# Patient Record
Sex: MALE | Race: WHITE | NOT HISPANIC OR LATINO | Employment: FULL TIME | URBAN - METROPOLITAN AREA
[De-identification: names, ages, dates, MRNs, and addresses within clinical notes are randomized per-mention and may not be internally consistent; named-entity substitution may affect disease eponyms.]

---

## 2017-03-22 ENCOUNTER — OFFICE VISIT (OUTPATIENT)
Dept: MEDICAL GROUP | Facility: PHYSICIAN GROUP | Age: 42
End: 2017-03-22
Payer: COMMERCIAL

## 2017-03-22 VITALS
TEMPERATURE: 97.5 F | HEART RATE: 108 BPM | RESPIRATION RATE: 14 BRPM | DIASTOLIC BLOOD PRESSURE: 104 MMHG | OXYGEN SATURATION: 97 % | SYSTOLIC BLOOD PRESSURE: 136 MMHG | WEIGHT: 189 LBS | HEIGHT: 73 IN | BODY MASS INDEX: 25.05 KG/M2

## 2017-03-22 DIAGNOSIS — L98.9 CHEST SKIN LESION: ICD-10-CM

## 2017-03-22 DIAGNOSIS — N52.9 ERECTILE DYSFUNCTION, UNSPECIFIED ERECTILE DYSFUNCTION TYPE: ICD-10-CM

## 2017-03-22 DIAGNOSIS — M79.671 PAIN IN BOTH FEET: ICD-10-CM

## 2017-03-22 DIAGNOSIS — M79.672 PAIN IN BOTH FEET: ICD-10-CM

## 2017-03-22 DIAGNOSIS — Z86.69 HX OF MIGRAINES: ICD-10-CM

## 2017-03-22 PROCEDURE — 99204 OFFICE O/P NEW MOD 45 MIN: CPT | Performed by: NURSE PRACTITIONER

## 2017-03-22 RX ORDER — IBUPROFEN 200 MG
200 TABLET ORAL EVERY 6 HOURS PRN
COMMUNITY
End: 2018-01-18

## 2017-03-22 RX ORDER — GABAPENTIN 300 MG/1
300 CAPSULE ORAL
Qty: 30 CAP | Refills: 1 | Status: SHIPPED | OUTPATIENT
Start: 2017-03-22 | End: 2017-04-19

## 2017-03-22 ASSESSMENT — PATIENT HEALTH QUESTIONNAIRE - PHQ9: CLINICAL INTERPRETATION OF PHQ2 SCORE: 0

## 2017-03-22 NOTE — ASSESSMENT & PLAN NOTE
Patient states he has had elevated blood pressure is entire life that he is aware of. He has not seen him for [place] for 20+ years. He plays basketball 2-3 times a week including gym time. His weight 3 years ago was 320 pounds. Today he is at 189. He watches what he eats and increased exercise and did the weight reduction on his own. Blood pressure today at clinic 136/104 pulse rate 108, oxygen saturation 97%. Denies headache chest pain palpitations

## 2017-03-22 NOTE — MR AVS SNAPSHOT
"Ivan Prescott   3/22/2017 9:00 AM   Office Visit   MRN: [id number]    Department:  [facility]   Dept Phone:  [phone]    Description:  Male : 1975   Provider:  NHAN Oshea           Reason for Visit     Establish Care     Numbness both feet     Leg Pain burning     Sexual Problem     Polyuria           Allergies as of 3/22/2017     No Known Allergies      You were diagnosed with     Pain in both feet   [778452]       Elevated blood pressure   [362758]       Erectile dysfunction, unspecified erectile dysfunction type   [1618963]       Chest skin lesion   [702071]       Hx of migraines   [737839]         Vital Signs     Blood Pressure Pulse Temperature Respirations Height Weight    136/104 mmHg 108 36.4 °C (97.5 °F) 14 1.854 m (6' 1\") 85.73 kg (189 lb)    Body Mass Index Oxygen Saturation Smoking Status             24.94 kg/m2 97% Never Smoker          Basic Information     Date Of Birth Sex Race Ethnicity Preferred Language    1975 Male White Non- English      Your appointments     Mar 25, 2017  8:15 AM   Adult Draw/Collection with [facility]   [facility] (--)    [address] [place] [address]   [phone]            2017  3:40 PM   Established Patient with NHAN Oshea   [facility] ([facility]    [address] [place] [address] [phone]           You will be receiving a confirmation call a few days before your appointment from our automated call confirmation system.              Problem List              ICD-10-CM Priority Class Noted - Resolved    Pain in both feet M79.671, M79.672   3/22/2017 - Present    Erectile dysfunction N52.9   3/22/2017 - Present    Elevated blood pressure I10   3/22/2017 - Present    Chest skin lesion L98.9   3/22/2017 - Present    Hx of migraines Z86.69   3/22/2017 - Present      Health Maintenance     Patient has no pending health maintenance at this time      Current Immunizations     No " immunizations on file.      Below and/or attached are the medications your provider expects you to take. Review all of your home medications and newly ordered medications with your provider and/or pharmacist. Follow medication instructions as directed by your provider and/or pharmacist. Please keep your medication list with you and share with your provider. Update the information when medications are discontinued, doses are changed, or new medications (including over-the-counter products) are added; and carry medication information at all times in the event of emergency situations     Allergies:  No Known Allergies          Medications  Valid as of: March 22, 2017 -  9:36 AM    Generic Name Brand Name Tablet Size Instructions for use    Gabapentin (Cap) NEURONTIN 300 MG Take 1 Cap by mouth at bedtime as needed.  May repeat 1 time..        Ibuprofen (Tab) MOTRIN 200 MG Take 200 mg by mouth every 6 hours as needed.        .                 Medicines prescribed today were sent to:     [facility] [address] [place], [place] [address] [address] [address]    Phone: [phone] Fax: [phone]    Open 24 Hours?: No      Medication refill instructions:       If your prescription bottle indicates you have medication refills left, it is not necessary to call your provider’s office. Please contact your pharmacy and they will refill your medication.    If your prescription bottle indicates you do not have any refills left, you may request refills at any time through one of the following ways: The online [employer] system (except Urgent Care), by calling your provider’s office, or by asking your pharmacy to contact your provider’s office with a refill request. Medication refills are processed only during regular business hours and may not be available until the next business day. Your provider may request additional information or to have a follow-up visit with you prior to refilling your  medication.   *Please Note: Medication refills are assigned a new Rx number when refilled electronically. Your pharmacy may indicate that no refills were authorized even though a new prescription for the same medication is available at the pharmacy. Please request the medicine by name with the pharmacy before contacting your provider for a refill.        Your To Do List     Future Labs/Procedures Complete By Expires    COMP METABOLIC PANEL  As directed 3/23/2018    HEMOGLOBIN A1C  As directed 3/23/2018    LIPID PROFILE  As directed 3/23/2018         Classting Access Code: [email or web address]  Expires: 4/21/2017  9:31 AM    Classting  A secure, online tool to manage your health information     [employer]’s Classting® is a secure, online tool that connects you to your personalized health information from the privacy of your home -- day or night - making it very easy for you to manage your healthcare. Once the activation process is completed, you can even access your medical information using the Classting cherelle, which is available for free in the [employer] C[employer] store or Google Play store.     Classting provides the following levels of access (as shown below):   My Chart Features   [facility] Doctor [facility]  Specialists [facility]  Urgent  Care Non-[facility]  Primary Care  Doctor   Email your healthcare team securely and privately 24/7 X X X    Manage appointments: schedule your next appointment; view details of past/upcoming appointments X      Request prescription refills. X      View recent personal medical records, including lab and immunizations X X X X   View health record, including health history, allergies, medications X X X X   Read reports about your outpatient visits, procedures, consult and ER notes X X X X   See your discharge summary, which is a recap of your hospital and/or ER visit that includes your diagnosis, lab results, and care plan. X X       How to register for Classting:  1. Go to   [email or web address].  2. Click on the Sign Up Now box, which takes you to the New Member Sign Up page. You will need to provide the following information:  a. Enter your [employer] Access Code exactly as it appears at the top of this page. (You will not need to use this code after you’ve completed the sign-up process. If you do not sign up before the expiration date, you must request a new code.)   b. Enter your date of birth.   c. Enter your home email address.   d. Click Submit, and follow the next screen’s instructions.  3. Create a [employer] [facility] ID. This will be your [employer] [facility] login ID and cannot be changed, so think of one that is secure and easy to remember.  4. Create a [employer] password. You can change your password at any time.  5. Enter your Password Reset Question and Answer. This can be used at a later time if you forget your password.   6. Enter your e-mail address. This allows you to receive e-mail notifications when new information is available in [employer] [facility].  7. Click Sign Up. You can now view your health information.    For assistance activating your [employer] account, call [phone]        Quit Tobacco Information     Do you want to quit using tobacco?    Quitting tobacco decreases risks of cancer, heart and lung disease, increases life expectancy, improves sense of taste and smell, and increases spending money, among other benefits.    If you are thinking about quitting, we can help.  • [facility] Quit Tobacco Program: 720.563.8471  o Program occurs weekly for four weeks and includes pharmacist consultation on products to support quitting smoking or chewing tobacco. A provider referral is needed for pharmacist consultation.  • Tobacco Users Help Hotline: 7-800-QUIT-NOW (410-3291) or https://[place].quitlogix.org/  o Free, confidential telephone and online coaching for [place] residents. Sessions are designed on a schedule that is convenient for you. Eligible clients receive free nicotine replacement  therapy.  • Nationally: www.smokefree.gov  o Information and professional assistance to support both immediate and long-term needs as you become, and remain, a non-smoker. Smokefree.gov allows you to choose the help that best fits your needs.

## 2017-03-22 NOTE — ASSESSMENT & PLAN NOTE
Patient reports that he has noticed a small lesion on his right chest just under his nipple area. No pain tenderness noted.

## 2017-03-22 NOTE — ASSESSMENT & PLAN NOTE
Patient states that he does have a history of migraine headaches. He states that when he gets these he takes Advil and that since up in a dark room and sleeps. He denies aura dizziness numbness or tingling in face or hands or abdominal pain.

## 2017-03-22 NOTE — PROGRESS NOTES
Ivan Prescott is a 41 y.o white. male here today to establish care and for evaluation and management of:    HPI:  Ivan works as a  for the [employer]. He has 2 children ages 3 and 5.  Chest skin lesion  Patient reports that he has noticed a small lesion on his right chest just under his nipple area. No pain tenderness noted.    Elevated blood pressure  Patient states he has had elevated blood pressure is entire life that he is aware of. He has not seen him for [place] for 20+ years. He plays basketball 2-3 times a week including gym time. His weight 3 years ago was 320 pounds. Today he is at 189. He watches what he eats and increased exercise and did the weight reduction on his own. Blood pressure today at clinic 136/104 pulse rate 108, oxygen saturation 97%. Denies headache chest pain palpitations    Erectile dysfunction  Patient states that he is very concerned that he is not able to maintain an erection. States that he does get aroused and has an erection but unable to maintain the erection. He denies any lesions swelling pain in penis or scrotum or testicles.    Pain in both feet  Patient states that when he gets home from work at night he has numbness in both feet and feels a fire-like pain at night when he is laying down. Denies ankle swelling, trauma, a change in shoes. Patient states that he occasionally takes ibuprofen but this does not completely help with this issue.    Hx of migraines  Patient states that he does have a history of migraine headaches. He states that when he gets these he takes Advil and that since up in a dark room and sleeps. He denies aura dizziness numbness or tingling in face or hands or abdominal pain.      Current medicines (including changes today)  Current Outpatient Prescriptions   Medication Sig Dispense Refill   • ibuprofen (MOTRIN) 200 MG Tab Take 200 mg by mouth every 6 hours as needed.     • gabapentin (NEURONTIN) 300 MG Cap Take 1 Cap by mouth at  "bedtime as needed.  May repeat 1 time.. 30 Cap 1     No current facility-administered medications for this visit.       He  has a past medical history of Migraines; Hypertension; Anxiety; and Allergy.    He  has past surgical history that includes knee arthrotomy and eye surgery.    Social History   Substance Use Topics   • Smoking status: Never Smoker    • Smokeless tobacco: Current User     Types: Chew   • Alcohol Use: No       Social History     Social History Narrative       Family History   Problem Relation Age of Onset   • No Known Problems Mother    • No Known Problems Father    • No Known Problems Sister    • Diabetes Paternal Grandmother        Family Status   Relation Status Death Age   • Mother Alive    • Father Alive    • Sister Alive          ROS  As stated in history of present illness.  All other systems reviewed and are negative     Objective:     Blood pressure 136/104, pulse 108, temperature 36.4 °C (97.5 °F), resp. rate 14, height 1.854 m (6' 1\"), weight 85.73 kg (189 lb), SpO2 97 %. Body mass index is 24.94 kg/(m^2).  Physical Exam:    Constitutional: Alert, no distress.  Skin: Warm, dry, good turgor, no rashes in visible areas. Small being sized lesion noted on right lower chest midline to the nipple.   Eye: Equal, round and reactive, conjunctiva clear, lids normal.  ENMT: Lips without lesions, good dentition, oropharynx clear. Patient sees dentist every 6 months. He is a every day tobacco chewer.   Neck: Trachea midline, no masses, no thyromegaly. No cervical or supraclavicular lymphadenopathy.  Respiratory: Unlabored respiratory effort, lungs clear to auscultation, no wheezes, no ronchi.  Cardiovascular: Normal S1, S2, no murmur, no edema.  Abdomen: Soft, non-tender, no masses, no hepatosplenomegaly.  Msk:Monofilament testing with a 10 gram force: sensation intact: intact bilaterally  Visual Inspection: Feet without maceration, ulcers, fissures.  Pedal pulses: intact bilaterally.    Psych: " Alert and oriented x3, normal affect and mood.        Assessment and Plan:   The following treatment plan was discussed    1. Pain in both feet  This is a new problem to me. Chronic. Ongoing. Unstable. Will draw labs to rule out diabetes as this could be contributing to his symptoms. Gabapentin 300 mg daily at bedtime as a trial to see if this will improve his symptoms at night and improve his sleep. Patient will follow up in 4 weeks.  - HEMOGLOBIN A1C; Future    2. Elevated blood pressure  This is a new problem to me. Chronic. Ongoing. Unstable. Discussed salt and diet and its effect on blood pressure. I do believe that since the patient has not seen a provider in 20 years she was extremely nervous to come in today this may have contributed to his reading not being at goal. We'll follow up in 4 weeks for recheck on blood pressure. Will draw labs to check renal liver and lipid profile. Monitor results.  - COMP METABOLIC PANEL; Future  - LIPID PROFILE; Future    3. Erectile dysfunction, unspecified erectile dysfunction type  This is a new problem to me. Chronic. Unstable. Will draw labs to rule out metabolic issues we'll monitor and follow    4. Chest skin lesion  This is a new problem to me. Chronic. Stable. This lesion feels more like a cyst. We will monitor this in 6 months to see if it's changed at all    5. Hx of migraines  This is a new problem to me. Chronic. Stable. Patient to continue to take Advil as needed for migraine pain. We'll monitor.      Records requested.  Followup: Return in about 4 weeks (around 4/19/2017) for HTN.

## 2017-03-22 NOTE — ASSESSMENT & PLAN NOTE
Patient states that he is very concerned that he is not able to maintain an erection. States that he does get aroused and has an erection but unable to maintain the erection. He denies any lesions swelling pain in penis or scrotum or testicles.

## 2017-03-22 NOTE — ASSESSMENT & PLAN NOTE
Patient states that when he gets home from work at night he has numbness in both feet and feels a fire-like pain at night when he is laying down. Denies ankle swelling, trauma, a change in shoes. Patient states that he occasionally takes ibuprofen but this does not completely help with this issue.

## 2017-03-25 ENCOUNTER — HOSPITAL ENCOUNTER (OUTPATIENT)
Dept: LAB | Facility: MEDICAL CENTER | Age: 42
End: 2017-03-25
Attending: NURSE PRACTITIONER
Payer: COMMERCIAL

## 2017-03-25 DIAGNOSIS — M79.672 PAIN IN BOTH FEET: ICD-10-CM

## 2017-03-25 DIAGNOSIS — N52.9 ERECTILE DYSFUNCTION, UNSPECIFIED ERECTILE DYSFUNCTION TYPE: ICD-10-CM

## 2017-03-25 DIAGNOSIS — M79.671 PAIN IN BOTH FEET: ICD-10-CM

## 2017-03-25 LAB
ALBUMIN SERPL BCP-MCNC: 4.4 G/DL (ref 3.2–4.9)
ALBUMIN/GLOB SERPL: 1.3 G/DL
ALP SERPL-CCNC: 145 U/L (ref 30–99)
ALT SERPL-CCNC: 57 U/L (ref 2–50)
ANION GAP SERPL CALC-SCNC: 8 MMOL/L (ref 0–11.9)
AST SERPL-CCNC: 31 U/L (ref 12–45)
BILIRUB SERPL-MCNC: 0.9 MG/DL (ref 0.1–1.5)
BUN SERPL-MCNC: 13 MG/DL (ref 8–22)
CALCIUM SERPL-MCNC: 9.8 MG/DL (ref 8.5–10.5)
CHLORIDE SERPL-SCNC: 103 MMOL/L (ref 96–112)
CHOLEST SERPL-MCNC: 230 MG/DL (ref 100–199)
CO2 SERPL-SCNC: 26 MMOL/L (ref 20–33)
CREAT SERPL-MCNC: 1.07 MG/DL (ref 0.5–1.4)
EST. AVERAGE GLUCOSE BLD GHB EST-MCNC: 324 MG/DL
GLOBULIN SER CALC-MCNC: 3.3 G/DL (ref 1.9–3.5)
GLUCOSE SERPL-MCNC: 365 MG/DL (ref 65–99)
HBA1C MFR BLD: 12.9 % (ref 0–5.6)
HDLC SERPL-MCNC: 39 MG/DL
LDLC SERPL CALC-MCNC: 168 MG/DL
POTASSIUM SERPL-SCNC: 4.9 MMOL/L (ref 3.6–5.5)
PROT SERPL-MCNC: 7.7 G/DL (ref 6–8.2)
PSA SERPL DL<=0.01 NG/ML-MCNC: 0.78 NG/ML (ref 0–4)
SODIUM SERPL-SCNC: 137 MMOL/L (ref 135–145)
TESTOST SERPL-MCNC: 260 NG/DL (ref 175–781)
TRIGL SERPL-MCNC: 114 MG/DL (ref 0–149)
TSH SERPL DL<=0.005 MIU/L-ACNC: 1.92 UIU/ML (ref 0.3–3.7)

## 2017-03-25 PROCEDURE — 84443 ASSAY THYROID STIM HORMONE: CPT

## 2017-03-25 PROCEDURE — 84153 ASSAY OF PSA TOTAL: CPT

## 2017-03-25 PROCEDURE — 80061 LIPID PANEL: CPT

## 2017-03-25 PROCEDURE — 80053 COMPREHEN METABOLIC PANEL: CPT

## 2017-03-25 PROCEDURE — 84403 ASSAY OF TOTAL TESTOSTERONE: CPT

## 2017-03-25 PROCEDURE — 83036 HEMOGLOBIN GLYCOSYLATED A1C: CPT

## 2017-03-25 PROCEDURE — 36415 COLL VENOUS BLD VENIPUNCTURE: CPT

## 2017-03-27 ENCOUNTER — TELEPHONE (OUTPATIENT)
Dept: MEDICAL GROUP | Facility: PHYSICIAN GROUP | Age: 42
End: 2017-03-27

## 2017-03-27 NOTE — TELEPHONE ENCOUNTER
----- Message from NHAN Oshea sent at 3/27/2017  7:20 AM PDT -----  Ivan  Your lab results are back.  I would like to go over these at your appointment in April.  The glucose and A1c levels indicate that you are diabetic.  A1c at 12.9 and glucose at 365.  This can certainly account for your feet pain and your ED.    Testosterone and PSA levels were normal.  Cholesterol came in high at 230.    Since we have a few weeks before your appointment, I would like you to start a medication, Metformin, to help get your blood sugar down.  You take the medication morning and night with meals.  It is important that you do some reading about diabetes and diet.  It all comes down to sugar and carbs(which turn into sugar).  Daily exercise is another important part of treating diabetes.    Let's start the metformin and then we will make a plan on April 19th when we see each other. Please let me know what questions you have.   NHAN Oshea

## 2017-03-27 NOTE — Clinical Note
March 27, 2017         Ivan Prescott  [address]  [facility] [address]        Ivan ,    Your lab results are back.  I would like to go over these at your appointment in April.  The glucose and A1c levels indicate that you are diabetic.  A1c at 12.9 and glucose at 365.  This can certainly account for your feet pain and your ED.     Testosterone and PSA levels were normal.  Cholesterol came in high at 230.     Since we have a few weeks before your appointment, I would like you to start a medication, Metformin, to help get your blood sugar down.  You take the medication morning and night with meals.  It is important that you do some reading about diabetes and diet.  It all comes down to sugar and carbs(which turn into sugar).  Daily exercise is another important part of treating diabetes.     Let's start the metformin and then we will make a plan on April 19th when we see each other. Please let me know what questions you have.           Resulted Orders   COMP METABOLIC PANEL   Result Value Ref Range    Sodium 137 135 - 145 mmol/L    Potassium 4.9 3.6 - 5.5 mmol/L    Chloride 103 96 - 112 mmol/L    Co2 26 20 - 33 mmol/L    Anion Gap 8.0 0.0 - 11.9    Glucose 365 (H) 65 - 99 mg/dL    Bun 13 8 - 22 mg/dL    Creatinine 1.07 0.50 - 1.40 mg/dL    Calcium 9.8 8.5 - 10.5 mg/dL    AST(SGOT) 31 12 - 45 U/L    ALT(SGPT) 57 (H) 2 - 50 U/L    Alkaline Phosphatase 145 (H) 30 - 99 U/L    Total Bilirubin 0.9 0.1 - 1.5 mg/dL    Albumin 4.4 3.2 - 4.9 g/dL    Total Protein 7.7 6.0 - 8.2 g/dL    Globulin 3.3 1.9 - 3.5 g/dL    A-G Ratio 1.3 g/dL    Narrative    Request patient fasting?->Yes   HEMOGLOBIN A1C   Result Value Ref Range    Glycohemoglobin 12.9 (H) 0.0 - 5.6 %      Comment:      Increased risk for diabetes:  5.7 -6.4%  Diabetes:  >6.4%  Glycemic control for adults with diabetes:  <7.0%  The above interpretations are per ADA guidelines.  Diagnosis  of diabetes mellitus on the basis of elevated Hemoglobin  A1c  should be confirmed by repeating the Hb A1c test.      Est Avg Glucose 324 mg/dL      Comment:      The eAG calculation is based on the A1c-Derived Daily Glucose  (ADAG) study.  See the ADA's website for additional information.      Narrative    Request patient fasting?->Yes   LIPID PROFILE   Result Value Ref Range    Cholesterol,Tot 230 (H) 100 - 199 mg/dL    Triglycerides 114 0 - 149 mg/dL    HDL 39 (A) >=40 mg/dL     (H) <100 mg/dL    Narrative    Request patient fasting?->Yes   TSH   Result Value Ref Range    TSH 1.920 0.300 - 3.700 uIU/mL    Narrative    Request patient fasting?->Yes   TESTOSTERONE SERUM   Result Value Ref Range    Testosterone,Total 260 175 - 781 ng/dL    Narrative    Request patient fasting?->Yes   PROSTATE SPECIFIC AG SCREENING   Result Value Ref Range    Prostatic Specific Antigen Tot 0.78 0.00 - 4.00 ng/mL      Comment:      The Access Hybr[employer]ch PSA assay is a paramagnetic particle,  chemiluminescent immunoassay for the quantitative determination  of total prostate specific antigen (PSA) levels using the  Access Immunoassay System. Values obtained with different  methods cannot be used interchangeably for patient monitoring.      Narrative    Request patient fasting?->Yes   ESTIMATED GFR   Result Value Ref Range    GFR If African American >60 >60 mL/min/1.73 m 2    GFR If Non African American >60 >60 mL/min/1.73 m 2    Narrative    Request patient fasting?->Yes         If you have any questions or concerns, please don't hesitate to call.        Sincerely,      NHAN Oshea    Electronically Signed

## 2017-04-19 ENCOUNTER — OFFICE VISIT (OUTPATIENT)
Dept: MEDICAL GROUP | Facility: PHYSICIAN GROUP | Age: 42
End: 2017-04-19
Payer: COMMERCIAL

## 2017-04-19 VITALS
RESPIRATION RATE: 14 BRPM | HEART RATE: 113 BPM | TEMPERATURE: 98.4 F | SYSTOLIC BLOOD PRESSURE: 138 MMHG | DIASTOLIC BLOOD PRESSURE: 100 MMHG | WEIGHT: 192 LBS | HEIGHT: 73 IN | OXYGEN SATURATION: 96 % | BODY MASS INDEX: 25.45 KG/M2

## 2017-04-19 DIAGNOSIS — M79.672 PAIN IN BOTH FEET: ICD-10-CM

## 2017-04-19 DIAGNOSIS — N52.9 ERECTILE DYSFUNCTION, UNSPECIFIED ERECTILE DYSFUNCTION TYPE: ICD-10-CM

## 2017-04-19 DIAGNOSIS — M79.671 PAIN IN BOTH FEET: ICD-10-CM

## 2017-04-19 DIAGNOSIS — I10 ESSENTIAL HYPERTENSION: ICD-10-CM

## 2017-04-19 PROCEDURE — 99214 OFFICE O/P EST MOD 30 MIN: CPT | Performed by: NURSE PRACTITIONER

## 2017-04-19 RX ORDER — GABAPENTIN 600 MG/1
600 TABLET ORAL DAILY
Qty: 30 TAB | Refills: 3 | Status: SHIPPED | OUTPATIENT
Start: 2017-04-19 | End: 2018-01-18

## 2017-04-19 RX ORDER — LISINOPRIL 20 MG/1
20 TABLET ORAL DAILY
Qty: 30 TAB | Refills: 3 | Status: SHIPPED | OUTPATIENT
Start: 2017-04-19 | End: 2017-05-15 | Stop reason: SDUPTHER

## 2017-04-19 RX ORDER — LANCETS 30 GAUGE
EACH MISCELLANEOUS
Qty: 100 EACH | Refills: 3 | Status: SHIPPED | OUTPATIENT
Start: 2017-04-19 | End: 2018-01-24

## 2017-04-19 RX ORDER — TADALAFIL 10 MG/1
10 TABLET ORAL PRN
Qty: 10 TAB | Refills: 3 | Status: SHIPPED | OUTPATIENT
Start: 2017-04-19 | End: 2017-10-24

## 2017-04-19 NOTE — MR AVS SNAPSHOT
"Ivan Prescott   2017 3:40 PM   Office Visit   MRN: [id number]    Department:  [facility]   Dept Phone:  [phone]    Description:  Male : 1975   Provider:  NHAN Oshea           Reason for Visit     Follow-Up diabetes    Medication Refill           Allergies as of 2017     No Known Allergies      You were diagnosed with     Uncontrolled type 2 diabetes mellitus with diabetic neuropathy, without long-term current use of insulin (CMS-[facility])   [[id number]]         Vital Signs     Blood Pressure Pulse Temperature Respirations Height Weight    138/100 mmHg 113 36.9 °C (98.4 °F) 14 1.854 m (6' 1\") 87.091 kg (192 lb)    Body Mass Index Oxygen Saturation Smoking Status             25.34 kg/m2 96% Never Smoker          Basic Information     Date Of Birth Sex Race Ethnicity Preferred Language    1975 Male White Non- English      Your appointments     2017  8:00 AM   Established Patient with NHAN Oshea, [place] DIABETES RN   [facility]    [address] [place] [id number] [phone]           You will be receiving a confirmation call a few days before your appointment from our automated call confirmation system.              Problem List              ICD-10-CM Priority Class Noted - Resolved    Pain in both feet M79.671, M79.672   3/22/2017 - Present    Erectile dysfunction N52.9   3/22/2017 - Present    Elevated blood pressure I10   3/22/2017 - Present    Chest skin lesion L98.9   3/22/2017 - Present    Hx of migraines Z86.69   3/22/2017 - Present      Health Maintenance        Date Due Completion Dates    IMM DTaP/Tdap/Td Vaccine (1 - Tdap) 1995 ---            Current Immunizations     No immunizations on file.      Below and/or attached are the medications your provider expects you to take. Review all of your home medications and newly ordered medications with your provider and/or pharmacist. Follow medication " instructions as directed by your provider and/or pharmacist. Please keep your medication list with you and share with your provider. Update the information when medications are discontinued, doses are changed, or new medications (including over-the-counter products) are added; and carry medication information at all times in the event of emergency situations     Allergies:  No Known Allergies          Medications  Valid as of: April 19, 2017 -  4:28 PM    Generic Name Brand Name Tablet Size Instructions for use    Blood Glucose Monitoring Suppl (Device) Blood Glucose Monitoring Suppl  Meter: Dispense [employer] [place] Lite meter. Sig. Use as directed for blood sugar monitoring.        Blood Glucose Monitoring Suppl (Misc) Blood Glucose Monitoring Suppl SUPPLIES Test strips order: Test strips for Abbott [place] Lite meter. Sig: use am and pm and prn ssx high or low sugar        Gabapentin (Tab) NEURONTIN 600 MG Take 1 Tab by mouth every day.        Ibuprofen (Tab) MOTRIN 200 MG Take 200 mg by mouth every 6 hours as needed.        Lancets (Misc) Lancets  Lancets order: Lancets for Abbott [place] Lite meter. Sig: use am and pm and prn ssx high or low sugar.        Lisinopril (Tab) PRINIVIL 20 MG Take 1 Tab by mouth every day.        MetFORMIN HCl (Tab) GLUCOPHAGE 500 MG Take 1 Tab by mouth 2 times a day, with meals.        Tadalafil (Tab) CIALIS 10 MG Take 1 Tab by mouth as needed for Erectile Dysfunction.        .                 Medicines prescribed today were sent to:     [facility] [address] [place], [place] [id number] [address] [address]    Phone: [phone] Fax: [phone]    Open 24 Hours?: No      Medication refill instructions:       If your prescription bottle indicates you have medication refills left, it is not necessary to call your provider’s office. Please contact your pharmacy and they will refill your medication.    If your prescription bottle indicates you do not  have any refills left, you may request refills at any time through one of the following ways: The online FullStory system (except Urgent Care), by calling your provider’s office, or by asking your pharmacy to contact your provider’s office with a refill request. Medication refills are processed only during regular business hours and may not be available until the next business day. Your provider may request additional information or to have a follow-up visit with you prior to refilling your medication.   *Please Note: Medication refills are assigned a new Rx number when refilled electronically. Your pharmacy may indicate that no refills were authorized even though a new prescription for the same medication is available at the pharmacy. Please request the medicine by name with the pharmacy before contacting your provider for a refill.           FullStory Access Code: Activation code not generated  Current FullStory Status: Active          Quit Tobacco Information     Do you want to quit using tobacco?    Quitting tobacco decreases risks of cancer, heart and lung disease, increases life expectancy, improves sense of taste and smell, and increases spending money, among other benefits.    If you are thinking about quitting, we can help.  • [employer] Quit Tobacco Program: 423.986.1367  o Program occurs weekly for four weeks and includes pharmacist consultation on products to support quitting smoking or chewing tobacco. A provider referral is needed for pharmacist consultation.  • Tobacco Users Help Hotline: 7-286-QUIT-NOW (069-4483) or https://[place].quitlogix.org/  o Free, confidential telephone and online coaching for [place] residents. Sessions are designed on a schedule that is convenient for you. Eligible clients receive free nicotine replacement therapy.  • Nationally: www.smokefree.gov  o Information and professional assistance to support both immediate and long-term needs as you become, and remain, a non-smoker.  Smokefree.gov allows you to choose the help that best fits your needs.

## 2017-04-20 NOTE — ASSESSMENT & PLAN NOTE
Patients labs with a1c 12.8.  Has been strictly watching carbs, increasing proteins, discontinued all fast foods, red bulls and caffiene.  Gym 3/week.  Working with wife to improve health.  Taking metformin 500 mg bid.  Had some diarrhea for 1st week, this has resolved.  Has not been checking blood sugars at this point.  States since taking metformin, he is not getting up to pee at night.

## 2017-04-20 NOTE — ASSESSMENT & PLAN NOTE
States he is having difficulty maintaining erection. We discussed the correlation with diabetes. He would like to try

## 2017-04-20 NOTE — ASSESSMENT & PLAN NOTE
Patient blood pressure continues to be elevated at 138/100.  Denies chest pain, visual disturbances, headaches.  Has been exercising 3/week. Weight stable.

## 2017-04-20 NOTE — ASSESSMENT & PLAN NOTE
Continues to have pain in both feet especially at night. States the gabapentin 300 mg has helped some but over the last 2 or 3 days he has noticed this pain increasing. He did try taking 600 mg one night and states that this helped him sleep better and help the pain.

## 2017-04-20 NOTE — PROGRESS NOTES
Chief Complaint   Patient presents with   • Follow-Up     diabetes   • Medication Refill       Subjective:   Ivan Prescott is a 41 y.o. male here today for evaluation and management of:    Essential hypertension  Patient blood pressure continues to be elevated at 138/100.  Denies chest pain, visual disturbances, headaches.  Has been exercising 3/week. Weight stable.      Uncontrolled type 2 diabetes mellitus with diabetic neuropathy, without long-term current use of insulin ([facility])  Patients labs with a1c 12.8.  Has been strictly watching carbs, increasing proteins, discontinued all fast foods, red bulls and caffiene.  Gym 3/week.  Working with wife to improve health.  Taking metformin 500 mg bid.  Had some diarrhea for 1st week, this has resolved.  Has not been checking blood sugars at this point.  States since taking metformin, he is not getting up to pee at night.     Pain in both feet  Continues to have pain in both feet especially at night. States the gabapentin 300 mg has helped some but over the last 2 or 3 days he has noticed this pain increasing. He did try taking 600 mg one night and states that this helped him sleep better and help the pain.    Erectile dysfunction  States he is having difficulty maintaining erection. We discussed the correlation with diabetes. He would like to try         Current medicines (including changes today)  Current Outpatient Prescriptions   Medication Sig Dispense Refill   • lisinopril (PRINIVIL) 20 MG Tab Take 1 Tab by mouth every day. 30 Tab 3   • tadalafil (CIALIS) 10 MG tablet Take 1 Tab by mouth as needed for Erectile Dysfunction. 10 Tab 3   • gabapentin (NEURONTIN) 600 MG tablet Take 1 Tab by mouth every day. 30 Tab 3   • Blood Glucose Monitoring Suppl Device Meter: Dispense Abbott [place] Lite meter. Sig. Use as directed for blood sugar monitoring. 1 Device 0   • Blood Glucose Monitoring Suppl SUPPLIES Misc Test strips order: Test strips for Abbott [place] Lite meter.  "Sig: use am and pm and prn ssx high or low sugar 100 Each 3   • Lancets Misc Lancets order: Lancets for Abbott [place] Lite meter. Sig: use am and pm and prn ssx high or low sugar. 100 Each 3   • metformin (GLUCOPHAGE) 500 MG Tab Take 1 Tab by mouth 2 times a day, with meals. 60 Tab 3   • ibuprofen (MOTRIN) 200 MG Tab Take 200 mg by mouth every 6 hours as needed.       No current facility-administered medications for this visit.     He  has a past medical history of Migraines; Hypertension; Anxiety; and Allergy.    ROS as stated in history of present illness.  No chest pain, no shortness of breath, no abdominal pain       Objective:     Blood pressure 138/100, pulse 113, temperature 36.9 °C (98.4 °F), resp. rate 14, height 1.854 m (6' 1\"), weight 87.091 kg (192 lb), SpO2 96 %. Body mass index is 25.34 kg/(m^2). stable  Physical Exam:  Constitutional: Alert, no distress.  Skin: Warm, dry, good turgor,no cyanosis, no rashes in visible areas.  Eye: Equal, round and reactive, conjunctiva clear, lids normal.  Ears: No tenderness, no discharge.  External canals are without any significant edema or erythema.  Tympanic membranes are without any inflammation, no effusion.  Gross auditory acuity is intact.  Nose: symmetrical without tenderness, no discharge.  Mouth/Throat: lips without lesion.  Oropharynx clear.  Throat without erythema, exudates or tonsillar enlargement.  Neck: Trachea midline, no masses, no obvious thyroid enlargement.. No cervical or supraclavicular lymphadenopathy. Range of motion within normal limits.  Neuro: Cranial nerves 2-12 grossly intact.  No sensory deficit. Pain at night in his feet. Pedal pulses 2+ bilaterally.  Respiratory: Unlabored respiratory effort, lungs clear to auscultation, no wheezes, no ronchi.  Cardiovascular: Normal S1, S2, no murmur, no edema.  Abdomen: Soft, non-tender, no masses, no guarding,  no hepatosplenomegaly.  Psych: Alert and oriented x3, normal affect and mood and " judgement.        Assessment and Plan:   The following treatment plan was discussed    1. Uncontrolled type 2 diabetes mellitus with diabetic neuropathy, without long-term current use of insulin (HCC)  This is a new problem to me. Acute. Uncontrolled. Continue with metformin 500 mg twice a day. Begin testing blood sugars a.m. and p.m. and recording them. We will add lisinopril for renal protection and to help with his blood pressure. Patient to return in 2 months to visit with diabetic educator. Continue with diet and exercise.    2. Essential hypertension      This is a new problem to me. Acute. Ongoing. Lisinopril 20 mg by mouth daily. Discussed benefits and risks of this medication with the patient and patient verbalizes understanding. We'll monitor when he returns in 2 months.    3. Pain in both feet  Chronic. Ongoing. Unstable. Increase gabapentin to 600 mg by mouth daily at bedtime. Discussed warnings regarding dizziness and sedation with gabapentin. She understands and agrees to the continuing this medication. We will monitor and follow up in 2 months.    4. Erectile dysfunction, unspecified erectile dysfunction type  Chronic. Unstable. Cialis 10 mg by mouth as needed for erectile dysfunction. We'll monitor and follow up in 2 months with his return appointment. ED precautions given.      Followup: Return in about 2 months (around 6/19/2017) for DM RN appointment.

## 2017-05-07 ENCOUNTER — TELEPHONE (OUTPATIENT)
Dept: MEDICAL GROUP | Facility: PHYSICIAN GROUP | Age: 42
End: 2017-05-07

## 2017-05-08 ENCOUNTER — PATIENT MESSAGE (OUTPATIENT)
Dept: MEDICAL GROUP | Facility: PHYSICIAN GROUP | Age: 42
End: 2017-05-08

## 2017-05-08 NOTE — TELEPHONE ENCOUNTER
After hours phone call  Call on 5/7/2017 at 7:55 PM from Ivan AL Prescott [phone] (home)  who reports PCP: NHAN Oshea.  Pt reports: blood glucose over 400 on 2 occasions this weekend; now also with chest tightness, dizziness and not feeling well  Plan: instructed pt to increase metformin to 1000 mg BID; if chest tightness continues, go to ER; wants to be seen by PCP in next 1-2 days; will notify   Shauna Mclean D.O.

## 2017-05-10 ENCOUNTER — PATIENT MESSAGE (OUTPATIENT)
Dept: MEDICAL GROUP | Facility: PHYSICIAN GROUP | Age: 42
End: 2017-05-10

## 2017-05-15 ENCOUNTER — PATIENT MESSAGE (OUTPATIENT)
Dept: MEDICAL GROUP | Facility: PHYSICIAN GROUP | Age: 42
End: 2017-05-15

## 2017-05-15 ENCOUNTER — OFFICE VISIT (OUTPATIENT)
Dept: MEDICAL GROUP | Facility: PHYSICIAN GROUP | Age: 42
End: 2017-05-15
Payer: COMMERCIAL

## 2017-05-15 VITALS
HEIGHT: 73 IN | HEART RATE: 103 BPM | BODY MASS INDEX: 24.78 KG/M2 | TEMPERATURE: 98.2 F | SYSTOLIC BLOOD PRESSURE: 128 MMHG | DIASTOLIC BLOOD PRESSURE: 72 MMHG | OXYGEN SATURATION: 97 % | WEIGHT: 187 LBS

## 2017-05-15 DIAGNOSIS — M79.671 PAIN IN BOTH FEET: ICD-10-CM

## 2017-05-15 DIAGNOSIS — M79.672 PAIN IN BOTH FEET: ICD-10-CM

## 2017-05-15 DIAGNOSIS — I10 ESSENTIAL HYPERTENSION: ICD-10-CM

## 2017-05-15 PROCEDURE — 99214 OFFICE O/P EST MOD 30 MIN: CPT | Performed by: NURSE PRACTITIONER

## 2017-05-15 RX ORDER — LISINOPRIL 20 MG/1
20 TABLET ORAL DAILY
Qty: 90 TAB | Refills: 3 | Status: SHIPPED | OUTPATIENT
Start: 2017-05-15 | End: 2018-01-18

## 2017-05-15 NOTE — ASSESSMENT & PLAN NOTE
Still having some calf pain and burning in his feet. He is on gabapentin 600 mg daily at bedtime. States his sleep has improved in the last couple of weeks he is now stating he gets 6 hours of sleep versus 4.

## 2017-05-15 NOTE — PROGRESS NOTES
Chief Complaint   Patient presents with   • Elevated Glucose     wants to know why glucose is spiking       Subjective:   Ivan Prescott is a 41 y.o. male here today for evaluation and management of:    Pain in both feet  Still having some calf pain and burning in his feet. He is on gabapentin 600 mg daily at bedtime. States his sleep has improved in the last couple of weeks he is now stating he gets 6 hours of sleep versus 4.    Uncontrolled type 2 diabetes mellitus with diabetic neuropathy, without long-term current use of insulin ([facility])  Patient comes today to discuss his blood sugars. He has been taking 1000 mg twice a day of metformin for the last week. His blood sugars are still running on average around 300 morning and night. He is being very consistent with his diet and exercise. His increasing his cardiovascular exercise. He is consistent with checking his blood sugars. He is taking his lisinopril 100% of the time. He continues to have some calf pain and foot numbness, and erectile dysfunction.    Essential hypertension  Taking lisinopril 20 mg by mouth daily. Blood pressure is at goal today 128/72. His heart rate is still elevated but down from 113. Today it is 103. He states he has some days where he is feeling tired but generally speaking over the last week he started to feel better.         Current medicines (including changes today)  Current Outpatient Prescriptions   Medication Sig Dispense Refill   • Empagliflozin 25 MG Tab Take 1 Tab by mouth every day. 30 Tab 3   • metformin (GLUCOPHAGE) 500 MG Tab Take 2 Tabs by mouth 2 times a day, with meals. 60 Tab 3   • lisinopril (PRINIVIL) 20 MG Tab Take 1 Tab by mouth every day. 90 Tab 3   • tadalafil (CIALIS) 10 MG tablet Take 1 Tab by mouth as needed for Erectile Dysfunction. 10 Tab 3   • gabapentin (NEURONTIN) 600 MG tablet Take 1 Tab by mouth every day. 30 Tab 3   • Blood Glucose Monitoring Suppl Device Meter: Dispense Abbott [place] Lite meter. Sig.  "Use as directed for blood sugar monitoring. 1 Device 0   • Blood Glucose Monitoring Suppl SUPPLIES Misc Test strips order: Test strips for Abbott [place] Lite meter. Sig: use am and pm and prn ssx high or low sugar 100 Each 3   • Lancets Misc Lancets order: Lancets for Abbott [place] Lite meter. Sig: use am and pm and prn ssx high or low sugar. 100 Each 3   • ibuprofen (MOTRIN) 200 MG Tab Take 200 mg by mouth every 6 hours as needed.       No current facility-administered medications for this visit.     He  has a past medical history of Migraines; Hypertension; Anxiety; and Allergy.    ROS as stated in history of present illness.  No chest pain, no shortness of breath, no abdominal pain       Objective:     Blood pressure 128/72, pulse 103, temperature 36.8 °C (98.2 °F), height 1.854 m (6' 1\"), weight 84.823 kg (187 lb), SpO2 97 %. Body mass index is 24.68 kg/(m^2). stable.  Physical Exam:  Constitutional: Alert, no distress.  Skin: Warm, dry, good turgor,no cyanosis, no rashes in visible areas.  Eye: Equal, round and reactive, conjunctiva clear, lids normal.  Ears: No tenderness, no discharge.  External canals are without any significant edema or erythema.  Gross auditory acuity is intact.  Nose: symmetrical without tenderness, no discharge.  Mouth/Throat: lips without lesion.  Oropharynx clear.  .  Neck: Trachea midline, no masses, no obvious thyroid enlargement..  Range of motion within normal limits.  Neuro: Cranial nerves 2-12 grossly intact.  No sensory deficit.  Respiratory: Unlabored respiratory effort, lungs clear to auscultation, no wheezes, no ronchi.  Cardiovascular: Normal S1, S2, no murmur, no edema.  Psych: Alert and oriented x3, normal affect and mood and judgement.        Assessment and Plan:   The following treatment plan was discussed    1. Pain in both feet  Chronic. Ongoing. Patient's pain could certainly be contributed to his high glucose level. Continue with gabapentin. Monitor and follow. He " does have an upcoming appointment with the diabetic nurse on the 26th of this month.    2. Uncontrolled type 2 diabetes mellitus with diabetic neuropathy, without long-term current use of insulin (HCC)  Acute. Uncontrolled. Continue with metformin 1000 mg by mouth twice a day. We will add chart and 25 mg by mouth daily. Patient to continue with morning and evening blood sugars. Continue with diet and exercise. He does have an appointment on May 26 with the diabetic nurse educator for a comprehensive visit. Monitor and follow.    3. Essential hypertension  Chronic. Improving. Continue with lisinopril 20 mg by mouth daily. We discussed at length today that the patient with diabetes will continue on an Ace inhibitor to protect his kidneys from the effects of diabetes. His blood pressure is at goal today. Continue to monitor and follow this.      Followup: Return in about 2 weeks (around 5/29/2017) for DM RN appointment.

## 2017-05-15 NOTE — ASSESSMENT & PLAN NOTE
Patient comes today to discuss his blood sugars. He has been taking 1000 mg twice a day of metformin for the last week. His blood sugars are still running on average around 300 morning and night. He is being very consistent with his diet and exercise. His increasing his cardiovascular exercise. He is consistent with checking his blood sugars. He is taking his lisinopril 100% of the time. He continues to have some calf pain and foot numbness, and erectile dysfunction.

## 2017-05-15 NOTE — ASSESSMENT & PLAN NOTE
Taking lisinopril 20 mg by mouth daily. Blood pressure is at goal today 128/72. His heart rate is still elevated but down from 113. Today it is 103. He states he has some days where he is feeling tired but generally speaking over the last week he started to feel better.

## 2017-05-26 ENCOUNTER — OFFICE VISIT (OUTPATIENT)
Dept: MEDICAL GROUP | Facility: PHYSICIAN GROUP | Age: 42
End: 2017-05-26
Payer: COMMERCIAL

## 2017-05-26 VITALS
DIASTOLIC BLOOD PRESSURE: 68 MMHG | TEMPERATURE: 98.4 F | BODY MASS INDEX: 24.78 KG/M2 | SYSTOLIC BLOOD PRESSURE: 100 MMHG | OXYGEN SATURATION: 98 % | WEIGHT: 187 LBS | HEART RATE: 96 BPM | HEIGHT: 73 IN

## 2017-05-26 DIAGNOSIS — I10 ESSENTIAL HYPERTENSION: ICD-10-CM

## 2017-05-26 PROCEDURE — 99215 OFFICE O/P EST HI 40 MIN: CPT | Performed by: NURSE PRACTITIONER

## 2017-05-26 NOTE — ASSESSMENT & PLAN NOTE
Blood pressure today 100/68. He is taking lisinopril 20 mg by mouth daily. Has occasional lightheaded spells but does not know if this is due to blood sugar, not eating or his blood pressure medication

## 2017-05-26 NOTE — MR AVS SNAPSHOT
"Ivan Prescott   2017 8:00 AM   Office Visit   MRN: [id number]    Department:  [facility]   Dept Phone:  [phone]    Description:  Male : 1975   Provider:  NHAN Oshea; [facility]           Reason for Visit     Diabetes           Allergies as of 2017     No Known Allergies      You were diagnosed with     Uncontrolled type 2 diabetes mellitus with diabetic neuropathy, without long-term current use of insulin ([facility])   [[id number]]         Vital Signs     Blood Pressure Pulse Temperature Height Weight Body Mass Index    100/68 mmHg 96 36.9 °C (98.4 °F) 1.854 m (6' 1\") 84.823 kg (187 lb) 24.68 kg/m2    Oxygen Saturation Smoking Status                98% Never Smoker           Basic Information     Date Of Birth Sex Race Ethnicity Preferred Language    1975 Male White Non- English      Your appointments     2017  4:30 PM   Non Provider 1 with [facility]    [address] [id number] [phone]           You will be receiving a confirmation call a few days before your appointment from our automated call confirmation system.            2017  7:40 AM   Established Patient with NHAN Oshea   [facility] ([place])    [address] [place] [id number] [phone]           You will be receiving a confirmation call a few days before your appointment from our automated call confirmation system.              Problem List              ICD-10-CM Priority Class Noted - Resolved    Pain in both feet M79.671, M79.672   3/22/2017 - Present    Erectile dysfunction N52.9   3/22/2017 - Present    Chest skin lesion L98.9   3/22/2017 - Present    Hx of migraines Z86.69   3/22/2017 - Present    Essential hypertension I10   2017 - Present    Uncontrolled type 2 diabetes mellitus with diabetic neuropathy, without long-term current use of insulin (HCC) E11.40, E11.65   " 4/19/2017 - Present      Health Maintenance        Date Due Completion Dates    IMM HEP B VACCINE (1 of 3 - Primary Series) 1975 ---    RETINAL SCREENING 7/12/1994 ---    URINE ACR / MICROALBUMIN 7/12/1994 ---    IMM DTaP/Tdap/Td Vaccine (1 - Tdap) 7/12/1995 ---    IMM PNEUMOCOCCAL 19-64 (ADULT) MEDIUM RISK SERIES (1 of 1 - PPSV23) 7/12/1995 ---    A1C SCREENING 9/25/2017 3/25/2017    FASTING LIPID PROFILE 3/25/2018 3/25/2017    SERUM CREATININE 3/25/2018 3/25/2017    DIABETES MONOFILAMENT / LE EXAM 5/26/2018 5/26/2017            Current Immunizations     No immunizations on file.      Below and/or attached are the medications your provider expects you to take. Review all of your home medications and newly ordered medications with your provider and/or pharmacist. Follow medication instructions as directed by your provider and/or pharmacist. Please keep your medication list with you and share with your provider. Update the information when medications are discontinued, doses are changed, or new medications (including over-the-counter products) are added; and carry medication information at all times in the event of emergency situations     Allergies:  No Known Allergies          Medications  Valid as of: May 26, 2017 -  9:39 AM    Generic Name Brand Name Tablet Size Instructions for use    Blood Glucose Monitoring Suppl (Device) Blood Glucose Monitoring Suppl  Meter: Dispense Posadas Rodman Lite meter. Sig. Use as directed for blood sugar monitoring.        Blood Glucose Monitoring Suppl (Misc) Blood Glucose Monitoring Suppl SUPPLIES Test strips order: Test strips for Abbott Rodman Lite meter. Sig: use am and pm and prn ssx high or low sugar        Empagliflozin (Tab) Empagliflozin 25 MG Take 1 Tab by mouth every day.        Gabapentin (Tab) NEURONTIN 600 MG Take 1 Tab by mouth every day.        Ibuprofen (Tab) MOTRIN 200 MG Take 200 mg by mouth every 6 hours as needed.        Lancets (Misc) Lancets  Lancets  order: Lancets for Abbott Boyce Lite meter. Sig: use am and pm and prn ssx high or low sugar.        Lisinopril (Tab) PRINIVIL 20 MG Take 1 Tab by mouth every day.        MetFORMIN HCl (Tab) GLUCOPHAGE 1000 MG Take  One 1000 mg tablet bid with meals        Tadalafil (Tab) CIALIS 10 MG Take 1 Tab by mouth as needed for Erectile Dysfunction.        .                 Medicines prescribed today were sent to:     [facility] [address] [place] [address] [address] [id number] [facility] [address]    Phone: [phone] Fax: [phone]    Open 24 Hours?: No      Medication refill instructions:       If your prescription bottle indicates you have medication refills left, it is not necessary to call your provider’s office. Please contact your pharmacy and they will refill your medication.    If your prescription bottle indicates you do not have any refills left, you may request refills at any time through one of the following ways: The online Other Machine system (except Urgent Care), by calling your provider’s office, or by asking your pharmacy to contact your provider’s office with a refill request. Medication refills are processed only during regular business hours and may not be available until the next business day. Your provider may request additional information or to have a follow-up visit with you prior to refilling your medication.   *Please Note: Medication refills are assigned a new Rx number when refilled electronically. Your pharmacy may indicate that no refills were authorized even though a new prescription for the same medication is available at the pharmacy. Please request the medicine by name with the pharmacy before contacting your provider for a refill.        Your To Do List     Future Labs/Procedures Complete By Expires    HEMOGLOBIN A1C  As directed 5/27/2018    MICROALBUMIN CREAT RATIO URINE  As directed 5/27/2018    VITAMIN D,25 HYDROXY  As directed 5/26/2018      Referral     A  referral request has been sent to our patient care coordination department. Please allow 3-5 business days for us to process this request and contact you either by phone or mail. If you do not hear from us by the 5th business day, please call us at [phone].           [employer] Access Code: Activation code not generated  Current [employer] Status: Active          Quit Tobacco Information     Do you want to quit using tobacco?    Quitting tobacco decreases risks of cancer, heart and lung disease, increases life expectancy, improves sense of taste and smell, and increases spending money, among other benefits.    If you are thinking about quitting, we can help.  • [facility] Quit Tobacco Program: 256.361.5753  o Program occurs weekly for four weeks and includes pharmacist consultation on products to support quitting smoking or chewing tobacco. A provider referral is needed for pharmacist consultation.  • Tobacco Users Help Hotline: 8-800-QUIT-NOW (160-9585) or https://[place].quitlogix.org/  o Free, confidential telephone and online coaching for [place] residents. Sessions are designed on a schedule that is convenient for you. Eligible clients receive free nicotine replacement therapy.  • Nationally: www.smokefree.gov  o Information and professional assistance to support both immediate and long-term needs as you become, and remain, a non-smoker. Smokefree.gov allows you to choose the help that best fits your needs.

## 2017-05-26 NOTE — PROGRESS NOTES
Subjective:     HPI    Chief Complaint   Patient presents with   • Diabetes       Ivan Prescott is a 41 y.o. male who presents for follow up of chronic conditions of diabetes mellitus, hypertension. Erectile dysfunction.    RN-CDE notes reviewed including HPI for DM, HTN,   Exercise frequency and limitations reviewed.  Feet symptoms reviewed.  Nutritional status reviewed.  Retinal eye exam history reviewed.    Patient additionally reports:    Erectile dysfunction  e      He indicates that he is feeling well and denies any symptoms referable to the above diagnoses. Specifically denies chest pain, palpitations, dyspnea, orthopnea, PND or peripheral edema. Also denies polyuria, polydipsia, urinary complaints, abdominal complaints, myalgias, numbness, weakness or other related symptoms.     Patient Active Problem List    Diagnosis Date Noted   • Essential hypertension 04/19/2017   • Uncontrolled type 2 diabetes mellitus with diabetic neuropathy, without long-term current use of insulin (HCC) 04/19/2017   • Pain in both feet 03/22/2017   • Erectile dysfunction 03/22/2017   • Chest skin lesion 03/22/2017   • Hx of migraines 03/22/2017       Health Maintenance/Immunizations: non  Health Maintenance Topics with due status: Overdue       Topic Date Due    IMM HEP B VACCINE 1975    RETINAL SCREENING 07/12/1994    URINE ACR / MICROALBUMIN 07/12/1994    IMM DTaP/Tdap/Td Vaccine 07/12/1995    IMM PNEUMOCOCCAL 19-64 (ADULT) MEDIUM RISK SERIES 07/12/1995         Current medications including changes today:  Current Outpatient Prescriptions   Medication Sig Dispense Refill   • metformin (GLUCOPHAGE) 1000 MG tablet Take  One 1000 mg tablet bid with meals 60 Tab 6   • Empagliflozin 25 MG Tab Take 1 Tab by mouth every day. 30 Tab 3   • lisinopril (PRINIVIL) 20 MG Tab Take 1 Tab by mouth every day. 90 Tab 3   • gabapentin (NEURONTIN) 600 MG tablet Take 1 Tab by mouth every day. 30 Tab 3   • Blood Glucose Monitoring Suppl  "Device Meter: Dispense Posadas [place] Lite meter. Sig. Use as directed for blood sugar monitoring. 1 Device 0   • Blood Glucose Monitoring Suppl SUPPLIES Misc Test strips order: Test strips for Abbott [place] Lite meter. Sig: use am and pm and prn ssx high or low sugar 100 Each 3   • Lancets Misc Lancets order: Lancets for Abbott [place] Lite meter. Sig: use am and pm and prn ssx high or low sugar. 100 Each 3   • tadalafil (CIALIS) 10 MG tablet Take 1 Tab by mouth as needed for Erectile Dysfunction. 10 Tab 3   • ibuprofen (MOTRIN) 200 MG Tab Take 200 mg by mouth every 6 hours as needed.       No current facility-administered medications for this visit.       Allergies: No Known Allergies     Social History   Substance Use Topics   • Smoking status: Never Smoker    • Smokeless tobacco: Current User     Types: Chew   • Alcohol Use: No       Family History   Problem Relation Age of Onset   • No Known Problems Mother    • No Known Problems Father    • No Known Problems Sister    • Diabetes Paternal Grandmother          ROS  Pertinent ROS findings as above.   All other systems reviewed and are negative except as per HPI.     Objective:     /68 mmHg  Pulse 96  Temp(Src) 36.9 °C (98.4 °F)  Ht 1.854 m (6' 1\")  Wt 84.823 kg (187 lb)  BMI 24.68 kg/m2  SpO2 98% Body mass index is 24.68 kg/(m^2).     Alert, oriented in no acute distress.  Eye contact is good, speech goal directed, affect calm.  HEENT: EOMI, PERRL, conjunctiva non-injected, sclera non-icteric.  Nares patent with no significant congestion or drainage.  Joya pinnae, external auditory canals, TM pearly gray with normal light reflex bilaterally.  Oral mucous membranes pink and moist with no lesions or thrush.  Neck supple with no cervical lymphadenopathy, JVD, palpable thyroid nodules or carotid bruits.  Lungs: clear to auscultation bilaterally with good excursion.  CV: regular rate and rhythm.  Abdomen soft, non-distended, non-tender with normal bowel " sounds. No CVAT  Lower extremities warm with no edema.  Feet are examined     Lab Results   Component Value Date/Time    GLYCOHEMOGLOBIN 12.9* 03/25/2017 07:53 AM          Assessment/Plan:       1. Uncontrolled type 2 diabetes mellitus with diabetic neuropathy, without long-term current use of insulin (HCC)  Diabetic Monofilament LE Exam    REFERRAL TO DIABETIC EDUCATION Diabetes Self Management Education / Training (DSME/T) and Medical Nutrition Therapy (MNT): Initial Group DSME/MNT as authorized by payor, Follow-Up DSME/MNT as authorized by payor; DSME/T Content: Monitoring Diabetes,     VITAMIN D,25 HYDROXY    MICROALBUMIN CREAT RATIO URINE    HEMOGLOBIN A1C   2. Essential hypertension         DM2 A1c is not at goal     -RN-CDE notes reviewed and discussed.  -Discussed diet, exercise, disease management and weight loss goals.   -Education and advice provided today: See RN-CDE note.    Additional education, advice, refills, and referrals per order    Follow-up:   Return in about 2 months (around 7/26/2017) for Diabetes. sooner should new symptoms or problems arise.    The total time spent seeing the patient in consultation, and formulating an action plan for this visit was 40 minutes.  Greater than 50% of this time was spent counseling, discussing problems documented above, coordinating care and answering questions by the provider and registered nurse--certified diabetes educator.

## 2017-05-26 NOTE — ASSESSMENT & PLAN NOTE
Patient here today to have cold visit with diabetic nurse educator and myself. Patient has been vigilant in keeping track of his blood sugars and diet and has recently increased his exercise as he is come back to the gym. Weight is stable at 187 today. Blood pressure is a little low at 100/68. Taking lisinopril 20 mg by mouth daily. He is currently taking metformin thousand milligrams twice a day with meals and we started  Januvia approximately 2 weeks ago. He has noticed that his blood sugars have improved with Januvia. Still complains of having erectile dysfunction but understands that as we improve his blood sugar this may improve also. Continues to take gabapentin daily for chronic calf and leg burning type pain.
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
## 2017-06-09 ENCOUNTER — TELEPHONE (OUTPATIENT)
Dept: MEDICAL GROUP | Facility: PHYSICIAN GROUP | Age: 42
End: 2017-06-09

## 2017-06-09 ENCOUNTER — NON-PROVIDER VISIT (OUTPATIENT)
Dept: MEDICAL GROUP | Facility: PHYSICIAN GROUP | Age: 42
End: 2017-06-09
Payer: COMMERCIAL

## 2017-06-09 VITALS — SYSTOLIC BLOOD PRESSURE: 94 MMHG | DIASTOLIC BLOOD PRESSURE: 80 MMHG

## 2017-06-09 NOTE — TELEPHONE ENCOUNTER
Pt came today for BP check.  It was 94/80.  He will be cutting his lisinopril in half as instructed by Katerine.

## 2017-06-09 NOTE — NON-PROVIDER
Ivan Prescott is a 41 y.o. male here for a non-provider visit for BP check    Filed Vitals:    06/09/17 1414   BP: 94/80     If abnormal, was an in office provider notified today? Yes  Routed to PCP? Yes

## 2017-06-09 NOTE — MR AVS SNAPSHOT
Ivan Prescott   2017 4:30 PM   Non-Provider Visit   MRN: [id number]    Department:  [facility]   Dept Phone:  [phone]    Description:  Male : 1975   Provider:  JASON PEARCE           Reason for Visit     Hypertension BP check      Allergies as of 2017     No Known Allergies      Vital Signs     Blood Pressure Smoking Status                94/80 mmHg Never Smoker           Basic Information     Date Of Birth Sex Race Ethnicity Preferred Language    1975 Male White Non- English      Your appointments     2017  4:30 PM   Non Provider 1 with VISTA MA   [facility] ([place])    [address] [place] [id number] [phone]           You will be receiving a confirmation call a few days before your appointment from our automated call confirmation system.            2017  7:40 AM   Established Patient with NHAN Oshea   [facility] ([place])    [address] [place] [address] [phone]           You will be receiving a confirmation call a few days before your appointment from our automated call confirmation system.              Problem List              ICD-10-CM Priority Class Noted - Resolved    Pain in both feet M79.671, M79.672   3/22/2017 - Present    Erectile dysfunction N52.9   3/22/2017 - Present    Chest skin lesion L98.9   3/22/2017 - Present    Hx of migraines Z86.69   3/22/2017 - Present    Essential hypertension I10   2017 - Present    Uncontrolled type 2 diabetes mellitus with diabetic neuropathy, without long-term current use of insulin (HCC) E11.40, E11.65   2017 - Present      Health Maintenance        Date Due Completion Dates    IMM HEP B VACCINE (1 of 3 - Primary Series) 1975 ---    RETINAL SCREENING 1994 ---    URINE ACR / MICROALBUMIN 1994 ---    IMM DTaP/Tdap/Td Vaccine (1 - Tdap) 1995 ---    IMM PNEUMOCOCCAL 19-64 (ADULT) MEDIUM RISK SERIES (1 of 1 -  PPSV23) 7/12/1995 ---    A1C SCREENING 9/25/2017 3/25/2017    FASTING LIPID PROFILE 3/25/2018 3/25/2017    SERUM CREATININE 3/25/2018 3/25/2017    DIABETES MONOFILAMENT / LE EXAM 5/26/2018 5/26/2017            Current Immunizations     No immunizations on file.      Below and/or attached are the medications your provider expects you to take. Review all of your home medications and newly ordered medications with your provider and/or pharmacist. Follow medication instructions as directed by your provider and/or pharmacist. Please keep your medication list with you and share with your provider. Update the information when medications are discontinued, doses are changed, or new medications (including over-the-counter products) are added; and carry medication information at all times in the event of emergency situations     Allergies:  No Known Allergies          Medications  Valid as of: June 09, 2017 -  3:29 PM    Generic Name Brand Name Tablet Size Instructions for use    Blood Glucose Monitoring Suppl (Device) Blood Glucose Monitoring Suppl  Meter: Dispense [employer] [place] Lite meter. Sig. Use as directed for blood sugar monitoring.        Blood Glucose Monitoring Suppl (Misc) Blood Glucose Monitoring Suppl SUPPLIES Test strips order: Test strips for [employer] [place] Lite meter. Sig: use am and pm and prn ssx high or low sugar        Empagliflozin (Tab) Empagliflozin 25 MG Take 1 Tab by mouth every day.        Gabapentin (Tab) NEURONTIN 600 MG Take 1 Tab by mouth every day.        Ibuprofen (Tab) MOTRIN 200 MG Take 200 mg by mouth every 6 hours as needed.        Lancets (Misc) Lancets  Lancets order: Lancets for [employer] [place] Lite meter. Sig: use am and pm and prn ssx high or low sugar.        Lisinopril (Tab) PRINIVIL 20 MG Take 1 Tab by mouth every day.        MetFORMIN HCl (Tab) GLUCOPHAGE 1000 MG Take  One 1000 mg tablet bid with meals        Tadalafil (Tab) CIALIS 10 MG Take 1 Tab by mouth as needed for Erectile  Dysfunction.        .                 Medicines prescribed today were sent to:     [facility] [address] [place] - [address]    [address] [place] [address]    Phone: [phone] Fax: [phone]    Open 24 Hours?: No      Medication refill instructions:       If your prescription bottle indicates you have medication refills left, it is not necessary to call your provider’s office. Please contact your pharmacy and they will refill your medication.    If your prescription bottle indicates you do not have any refills left, you may request refills at any time through one of the following ways: The online StyleChat by Pro[employer] Mobile system (except Urgent Care), by calling your provider’s office, or by asking your pharmacy to contact your provider’s office with a refill request. Medication refills are processed only during regular business hours and may not be available until the next business day. Your provider may request additional information or to have a follow-up visit with you prior to refilling your medication.   *Please Note: Medication refills are assigned a new Rx number when refilled electronically. Your pharmacy may indicate that no refills were authorized even though a new prescription for the same medication is available at the pharmacy. Please request the medicine by name with the pharmacy before contacting your provider for a refill.           StyleChat by Pro[employer] Mobile Access Code: Activation code not generated  Current StyleChat by Pro[employer] Mobile Status: Active          Quit Tobacco Information     Do you want to quit using tobacco?    Quitting tobacco decreases risks of cancer, heart and lung disease, increases life expectancy, improves sense of taste and smell, and increases spending money, among other benefits.    If you are thinking about quitting, we can help.  • [facility] Quit Tobacco Program: [phone]  o Program occurs weekly for four weeks and includes pharmacist consultation on products to support quitting smoking or  chewing tobacco. A provider referral is needed for pharmacist consultation.  • Tobacco Users Help Hotline: 2-800QUIT-NOW (488-1463) or https://[place].quitlogix.org/  o Free, confidential telephone and online coaching for [place] residents. Sessions are designed on a schedule that is convenient for you. Eligible clients receive free nicotine replacement therapy.  • Nationally: www.smokefree.gov  o Information and professional assistance to support both immediate and long-term needs as you become, and remain, a non-smoker. Smokefree.gov allows you to choose the help that best fits your needs.

## 2017-06-09 NOTE — TELEPHONE ENCOUNTER
Please come in again in one week for re-check after being on 1/2 dose for a week    ODILON Neal.

## 2017-06-13 ENCOUNTER — PATIENT MESSAGE (OUTPATIENT)
Dept: MEDICAL GROUP | Facility: PHYSICIAN GROUP | Age: 42
End: 2017-06-13

## 2017-06-13 NOTE — TELEPHONE ENCOUNTER
"From: Jorgito Valiente  To: NHAN Oshea  Sent: 6/13/2017 3:21 PM PDT  Subject: Non-Urgent Medical Question    I will go with 1 in the morning and 1 at night. Just worried about bring drowsy at work.  ----- Message -----  From: NHAN Oshea  Sent: 6/13/2017 11:22 AM PDT  To: Jorgito Valiente  Subject: RE: Non-Urgent Medical Question    Hunter Sidhu to hear about your legs. Did the 2 gabapentin help you sleep or not? You could experiment this next week by taking one in the morning and two at night to see if we can \"catch up\" a bit. Please let me know if this sounds like something you could try.   NHAN Oshea      ----- Message -----   From: JORGITO VALIENTE   Sent: 6/13/2017 7:08 AM PDT   To: NHAN Oshea  Subject: Non-Urgent Medical Question    Over the past 2 days I have had to take 2 gabapentin to sleep. My legs are cramping and numb. I'd there a medication stronger, because this is not working.  "

## 2017-06-16 ENCOUNTER — NON-PROVIDER VISIT (OUTPATIENT)
Dept: MEDICAL GROUP | Facility: PHYSICIAN GROUP | Age: 42
End: 2017-06-16
Payer: COMMERCIAL

## 2017-06-16 VITALS — SYSTOLIC BLOOD PRESSURE: 94 MMHG | HEART RATE: 121 BPM | DIASTOLIC BLOOD PRESSURE: 74 MMHG

## 2017-06-16 NOTE — MR AVS SNAPSHOT
Ivan Prescott   2017 2:15 PM   Non-Provider Visit   MRN: [id number]    Department:  [facility]   Dept Phone:  [phone]    Description:  Male : 1975   Provider:  JASON PEARCE           Reason for Visit     Other BP check      Allergies as of 2017     No Known Allergies      Vital Signs     Blood Pressure Pulse Smoking Status             94/74 mmHg 121 Never Smoker          Basic Information     Date Of Birth Sex Race Ethnicity Preferred Language    1975 Male White Non- English      Your appointments     2017  4:00 PM   Non Provider 1 with VISTA MA   [facility] ([place])    [address] [place] [id number] [phone]           You will be receiving a confirmation call a few days before your appointment from our automated call confirmation system.            2017  7:40 AM   Established Patient with NHAN Oshea   [facility] ([place]    [address] [place] [id number] [phone]           You will be receiving a confirmation call a few days before your appointment from our automated call confirmation system.              Problem List              ICD-10-CM Priority Class Noted - Resolved    Pain in both feet M79.671, M79.672   3/22/2017 - Present    Erectile dysfunction N52.9   3/22/2017 - Present    Chest skin lesion L98.9   3/22/2017 - Present    Hx of migraines Z86.69   3/22/2017 - Present    Essential hypertension I10   2017 - Present    Uncontrolled type 2 diabetes mellitus with diabetic neuropathy, without long-term current use of insulin (HCC) E11.40, E11.65   2017 - Present      Health Maintenance        Date Due Completion Dates    IMM HEP B VACCINE (1 of 3 - Primary Series) 1976 ---    RETINAL SCREENING 1994 ---    URINE ACR / MICROALBUMIN 1994 ---    IMM DTaP/Tdap/Td Vaccine (1 - Tdap) 1995 ---    IMM PNEUMOCOCCAL 19-64 (ADULT) MEDIUM RISK SERIES (1 of 1 -  PPSV23) 7/12/1995 ---    A1C SCREENING 9/25/2017 3/25/2017    FASTING LIPID PROFILE 3/25/2018 3/25/2017    SERUM CREATININE 3/25/2018 3/25/2017    DIABETES MONOFILAMENT / LE EXAM 5/26/2018 5/26/2017            Current Immunizations     No immunizations on file.      Below and/or attached are the medications your provider expects you to take. Review all of your home medications and newly ordered medications with your provider and/or pharmacist. Follow medication instructions as directed by your provider and/or pharmacist. Please keep your medication list with you and share with your provider. Update the information when medications are discontinued, doses are changed, or new medications (including over-the-counter products) are added; and carry medication information at all times in the event of emergency situations     Allergies:  No Known Allergies          Medications  Valid as of: June 16, 2017 -  5:29 PM    Generic Name Brand Name Tablet Size Instructions for use    Blood Glucose Monitoring Suppl (Device) Blood Glucose Monitoring Suppl  Meter: Dispense [employer] [place] Lite meter. Sig. Use as directed for blood sugar monitoring.        Blood Glucose Monitoring Suppl (Misc) Blood Glucose Monitoring Suppl SUPPLIES Test strips order: Test strips for Abbott [place] Lite meter. Sig: use am and pm and prn ssx high or low sugar        Empagliflozin (Tab) Empagliflozin 25 MG Take 1 Tab by mouth every day.        Gabapentin (Tab) NEURONTIN 600 MG Take 1 Tab by mouth every day.        Ibuprofen (Tab) MOTRIN 200 MG Take 200 mg by mouth every 6 hours as needed.        Lancets (Misc) Lancets  Lancets order: Lancets for Abbott [place] Lite meter. Sig: use am and pm and prn ssx high or low sugar.        Lisinopril (Tab) PRINIVIL 20 MG Take 1 Tab by mouth every day.        MetFORMIN HCl (Tab) GLUCOPHAGE 1000 MG Take  One 1000 mg tablet bid with meals        Tadalafil (Tab) CIALIS 10 MG Take 1 Tab by mouth as needed for Erectile  Dysfunction.        .                 Medicines prescribed today were sent to:     [facility] [address] [place] - [address]    [address] [place] [address]    Phone: [phone] Fax: [phone]    Open 24 Hours?: No      Medication refill instructions:       If your prescription bottle indicates you have medication refills left, it is not necessary to call your provider’s office. Please contact your pharmacy and they will refill your medication.    If your prescription bottle indicates you do not have any refills left, you may request refills at any time through one of the following ways: The online Growl [employer] system (except Urgent Care), by calling your provider’s office, or by asking your pharmacy to contact your provider’s office with a refill request. Medication refills are processed only during regular business hours and may not be available until the next business day. Your provider may request additional information or to have a follow-up visit with you prior to refilling your medication.   *Please Note: Medication refills are assigned a new Rx number when refilled electronically. Your pharmacy may indicate that no refills were authorized even though a new prescription for the same medication is available at the pharmacy. Please request the medicine by name with the pharmacy before contacting your provider for a refill.           G[employer]l [employer] Access Code: Activation code not generated  Current [employer] Status: Active          Quit Tobacco Information     Do you want to quit using tobacco?    Quitting tobacco decreases risks of cancer, heart and lung disease, increases life expectancy, improves sense of taste and smell, and increases spending money, among other benefits.    If you are thinking about quitting, we can help.  • [facility] Quit Tobacco Program: 138.401.7823  o Program occurs weekly for four weeks and includes pharmacist consultation on products to support quitting smoking or  chewing tobacco. A provider referral is needed for pharmacist consultation.  • Tobacco Users Help Hotline: 0-800QUIT-NOW (645-5203) or https://[place].quitlogix.org/  o Free, confidential telephone and online coaching for [place] residents. Sessions are designed on a schedule that is convenient for you. Eligible clients receive free nicotine replacement therapy.  • Nationally: www.smokefree.gov  o Information and professional assistance to support both immediate and long-term needs as you become, and remain, a non-smoker. Smokefree.gov allows you to choose the help that best fits your needs.

## 2017-06-16 NOTE — PROGRESS NOTES
Ivan Prescott is a 41 y.o. male here for a non-provider visit for Blood pressure check    Filed Vitals:    06/16/17 1434   BP: 94/74   Pulse: 121     If abnormal, was an in office provider notified today? Yes  Routed to PCP? Yes

## 2017-06-23 ENCOUNTER — NON-PROVIDER VISIT (OUTPATIENT)
Dept: MEDICAL GROUP | Facility: PHYSICIAN GROUP | Age: 42
End: 2017-06-23
Payer: COMMERCIAL

## 2017-06-23 VITALS — SYSTOLIC BLOOD PRESSURE: 102 MMHG | DIASTOLIC BLOOD PRESSURE: 86 MMHG | HEART RATE: 121 BPM

## 2017-06-23 DIAGNOSIS — M79.671 PAIN IN BOTH FEET: ICD-10-CM

## 2017-06-23 DIAGNOSIS — M79.672 PAIN IN BOTH FEET: ICD-10-CM

## 2017-06-23 DIAGNOSIS — I10 ESSENTIAL HYPERTENSION: ICD-10-CM

## 2017-06-23 LAB
GLUCOSE BLD-MCNC: 280 MG/DL (ref 70–100)
HBA1C MFR BLD: 9.5 % (ref ?–5.8)
INT CON NEG: NEGATIVE
INT CON POS: POSITIVE

## 2017-06-23 PROCEDURE — 83036 HEMOGLOBIN GLYCOSYLATED A1C: CPT | Performed by: NURSE PRACTITIONER

## 2017-06-23 PROCEDURE — 99214 OFFICE O/P EST MOD 30 MIN: CPT | Performed by: NURSE PRACTITIONER

## 2017-06-23 NOTE — ASSESSMENT & PLAN NOTE
Patient here today for blood pressure check patient complains of an episode of blurry vision today. Blood sugar and A1c checked in the clinic today. Blood sugar 280 and A1c has decreased to 9.5 from our original 13. Patient states that he did have a deli sandwich for lunch and new that his blood sugar would be elevated.

## 2017-06-23 NOTE — ASSESSMENT & PLAN NOTE
Patient has been off lisinopril for the last 2 weeks. His blood pressure today is 102/86. Patient states that he feels much better being off the lisinopril. He states he has much more energy and is not so lethargic during the day and night. Patient denies chest pain shortness of breath headaches

## 2017-06-23 NOTE — PROGRESS NOTES
Chief Complaint   Patient presents with   • Blood Pressure Problem     bp check   • Blurred Vision   • Tingling     and burning in hands and feet       Subjective:   Ivan Prescott is a 41 y.o. male here today for evaluation and management of:    Pain in both feet  States pain in feet is out of control.  Thinks maybe his blood sugar is elevated.  BS this morning was 156. Describes pain as burning shooting pain in both feet that feels like his feet are numb and tingly. Patient drives a truck throughout the day.    Uncontrolled type 2 diabetes mellitus with diabetic neuropathy, without long-term current use of insulin ([facility])  Patient here today for blood pressure check patient complains of an episode of blurry vision today. Blood sugar and A1c checked in the clinic today. Blood sugar 280 and A1c has decreased to 9.5 from our original 13. Patient states that he did have a deli sandwich for lunch and new that his blood sugar would be elevated.    Essential hypertension  Patient has been off lisinopril for the last 2 weeks. His blood pressure today is 102/86. Patient states that he feels much better being off the lisinopril. He states he has much more energy and is not so lethargic during the day and night. Patient denies chest pain shortness of breath headaches         Current medicines (including changes today)  Current Outpatient Prescriptions   Medication Sig Dispense Refill   • metformin (GLUCOPHAGE) 1000 MG tablet Take  One 1000 mg tablet bid with meals 60 Tab 6   • Empagliflozin 25 MG Tab Take 1 Tab by mouth every day. 30 Tab 3   • lisinopril (PRINIVIL) 20 MG Tab Take 1 Tab by mouth every day. 90 Tab 3   • tadalafil (CIALIS) 10 MG tablet Take 1 Tab by mouth as needed for Erectile Dysfunction. 10 Tab 3   • gabapentin (NEURONTIN) 600 MG tablet Take 1 Tab by mouth every day. 30 Tab 3   • Blood Glucose Monitoring Suppl Device Meter: Dispense Abbott [place] Lite meter. Sig. Use as directed for blood sugar monitoring.  1 Device 0   • Blood Glucose Monitoring Suppl SUPPLIES Misc Test strips order: Test strips for Abbott [place] Lite meter. Sig: use am and pm and prn ssx high or low sugar 100 Each 3   • Lancets Misc Lancets order: Lancets for Abbott [place] Lite meter. Sig: use am and pm and prn ssx high or low sugar. 100 Each 3   • ibuprofen (MOTRIN) 200 MG Tab Take 200 mg by mouth every 6 hours as needed.       No current facility-administered medications for this visit.     He  has a past medical history of Migraines; Hypertension; Anxiety; and Allergy.    ROS as stated in history of present illness.  No chest pain, no shortness of breath, no abdominal pain       Objective:     Blood pressure 102/86, pulse 121. There is no weight on file to calculate BMI. patient was here for a blood pressure check only and due to his symptoms we changed it to an office visit.  Physical Exam:  Constitutional: Alert, no distress.  Skin: Warm, dry, good turgor,no cyanosis, no rashes in visible areas.  Eye: Equal, round and reactive, conjunctiva clear, lids normal. Patient states his vision has returned to normal from his blurry vision episode earlier today.  Ears: No tenderness, no discharge.  External canals are without any significant edema or erythema.  .  Gross auditory acuity is intact.  Nose: symmetrical without tenderness, no discharge.  Mouth/Throat: lips without lesion.  Oropharynx clear.    Neck: Trachea midline, no masses, no obvious thyroid enlargement Range of motion within normal limits.  Neuro: Cranial nerves 2-12 grossly intact.  No sensory deficit.  Respiratory: Unlabored respiratory effort, lungs clear to auscultation, no wheezes, no ronchi.  Cardiovascular: Normal S1, S2, no murmur, no edema.  MSK: Bilateral pedal pulses intact. Pain and tenderness in bilateral feet no lesions noted pain radiates up into thighs bilaterally.  Psych: Alert and oriented x3, normal affect and mood and judgement.        Assessment and Plan:   The  following treatment plan was discussed    1. Pain in both feet  Chronic. Unstable. Glucose today 280 A1c 9.5 I discussed at length with patient that he is currently taking gabapentin for neuropathy pain. I would like to refer him to a neurologist for evaluation and consultation as this symptom as difficult to control and evaluate. Monitor and follow.  - POC GLUCOSE  - REFERRAL TO NEUROLOGY    2. Uncontrolled type 2 diabetes mellitus with diabetic neuropathy, without long-term current use of insulin ([facility])  Chronic. Improving. A1c 9.5 today. However his glucose today is 280 following a deli sandwich for lunch. Reviewed diet exercise and his medications. Patient has a return appointment in one month with the diabetic educator to monitor and follow. ED precautions given.  - POC GLUCOSE  - POCT  A1C    3. Essential hypertension  Chronic. Blood pressure today 102/86. Last week when he came in for blood pressure check it was 94/74 and he was not feeling well. We held the lisinopril and recheck today. We will continue to hold the lisinopril. However, I discussed at length with the patient the need to for kidney protection to be on an Ace or an RPR. We will readdress this issue when he returns on 7/31/2017. Patient has had improvement in fatigue since he went off the lisinopril.      Followup: Return in about 4 weeks (around 7/21/2017) for Diabetes.

## 2017-06-23 NOTE — MR AVS SNAPSHOT
Ivan Prescott   2017 4:00 PM   Non-Provider Visit   MRN: [id number]    Department:  [facility]   Dept Phone:  [phone]    Description:  Male : 1975   Provider:  NHAN Oshea           Reason for Visit     Blood Pressure Problem bp check    Blurred Vision     Tingling and burning in hands and feet      Allergies as of 2017     No Known Allergies      You were diagnosed with     Pain in both feet   [515826]       Uncontrolled type 2 diabetes mellitus with diabetic neuropathy, without long-term current use of insulin (CMS-[facility])   [7898791]       Essential hypertension   [6535518]         Vital Signs     Blood Pressure Pulse Smoking Status             102/86 mmHg 121 Never Smoker          Basic Information     Date Of Birth Sex Race Ethnicity Preferred Language    1975 Male White Non- English      Your appointments     2017  7:40 AM   Established Patient with NHAN Oshea   [address] [id number] [phone]           You will be receiving a confirmation call a few days before your appointment from our automated call confirmation system.              Problem List              ICD-10-CM Priority Class Noted - Resolved    Pain in both feet M79.671, M79.672   3/22/2017 - Present    Erectile dysfunction N52.9   3/22/2017 - Present    Chest skin lesion L98.9   3/22/2017 - Present    Hx of migraines Z86.69   3/22/2017 - Present    Essential hypertension I10   2017 - Present    Uncontrolled type 2 diabetes mellitus with diabetic neuropathy, without long-term current use of insulin ([facility]) E11.40, E11.65   2017 - Present      Health Maintenance        Date Due Completion Dates    IMM HEP B VACCINE (1 of 3 - Primary Series) 1975 ---    RETINAL SCREENING 1994 ---    URINE ACR / MICROALBUMIN 1994 ---    IMM DTaP/Tdap/Td Vaccine (1 - Tdap) 1995 ---    IMM PNEUMOCOCCAL  19-64 (ADULT) MEDIUM RISK SERIES (1 of 1 - PPSV23) 7/12/1995 ---    A1C SCREENING 9/25/2017 3/25/2017    FASTING LIPID PROFILE 3/25/2018 3/25/2017    SERUM CREATININE 3/25/2018 3/25/2017    DIABETES MONOFILAMENT / LE EXAM 5/26/2018 5/26/2017            Results     POC GLUCOSE      Component Value Standard Range & Units    Glucose - Accu-Ck 280 70 - 100 mg/dL                POCT  A1C      Component    Glycohemoglobin    9.5    Internal Control Negative    Negative    Internal Control Positive    Positive                        Current Immunizations     No immunizations on file.      Below and/or attached are the medications your provider expects you to take. Review all of your home medications and newly ordered medications with your provider and/or pharmacist. Follow medication instructions as directed by your provider and/or pharmacist. Please keep your medication list with you and share with your provider. Update the information when medications are discontinued, doses are changed, or new medications (including over-the-counter products) are added; and carry medication information at all times in the event of emergency situations     Allergies:  No Known Allergies          Medications  Valid as of: June 23, 2017 -  5:05 PM    Generic Name Brand Name Tablet Size Instructions for use    Blood Glucose Monitoring Suppl (Device) Blood Glucose Monitoring Suppl  Meter: Dispense [employer] [place] Lite meter. Sig. Use as directed for blood sugar monitoring.        Blood Glucose Monitoring Suppl (Misc) Blood Glucose Monitoring Suppl SUPPLIES Test strips order: Test strips for Abbott [place] Lite meter. Sig: use am and pm and prn ssx high or low sugar        Empagliflozin (Tab) Empagliflozin 25 MG Take 1 Tab by mouth every day.        Gabapentin (Tab) NEURONTIN 600 MG Take 1 Tab by mouth every day.        Ibuprofen (Tab) MOTRIN 200 MG Take 200 mg by mouth every 6 hours as needed.        Lancets (Misc) Lancets  Lancets order:  Lancets for Abbott [place] Lite meter. Sig: use am and pm and prn ssx high or low sugar.        Lisinopril (Tab) PRINIVIL 20 MG Take 1 Tab by mouth every day.        MetFORMIN HCl (Tab) GLUCOPHAGE 1000 MG Take  One 1000 mg tablet bid with meals        Tadalafil (Tab) CIALIS 10 MG Take 1 Tab by mouth as needed for Erectile Dysfunction.        .                 Medicines prescribed today were sent to:     [facility] [address] [place] [address] [address] [facility]    [address] [facility] [place] [address]    Phone: [phone] Fax: [phone]    Open 24 Hours?: No      Medication refill instructions:       If your prescription bottle indicates you have medication refills left, it is not necessary to call your provider’s office. Please contact your pharmacy and they will refill your medication.    If your prescription bottle indicates you do not have any refills left, you may request refills at any time through one of the following ways: The online [employer] system (except Urgent Care), by calling your provider’s office, or by asking your pharmacy to contact your provider’s office with a refill request. Medication refills are processed only during regular business hours and may not be available until the next business day. Your provider may request additional information or to have a follow-up visit with you prior to refilling your medication.   *Please Note: Medication refills are assigned a new Rx number when refilled electronically. Your pharmacy may indicate that no refills were authorized even though a new prescription for the same medication is available at the pharmacy. Please request the medicine by name with the pharmacy before contacting your provider for a refill.        Referral     A referral request has been sent to our patient care coordination department. Please allow 3-5 business days for us to process this request and contact you either by phone or mail. If you do not hear from us by the 5th  business day, please call us at [phone].           [employer] Access Code: Activation code not generated  Current [employer] Status: Active          Quit Tobacco Information     Do you want to quit using tobacco?    Quitting tobacco decreases risks of cancer, heart and lung disease, increases life expectancy, improves sense of taste and smell, and increases spending money, among other benefits.    If you are thinking about quitting, we can help.  • [facility] Quit Tobacco Program: [phone]  o Program occurs weekly for four weeks and includes pharmacist consultation on products to support quitting smoking or chewing tobacco. A provider referral is needed for pharmacist consultation.  • Tobacco Users Help Hotline: 2-800QUIT-NOW (363-8779) or https://[place].quitlogix.org/  o Free, confidential telephone and online coaching for [place] residents. Sessions are designed on a schedule that is convenient for you. Eligible clients receive free nicotine replacement therapy.  • Nationally: www.smokefree.gov  o Information and professional assistance to support both immediate and long-term needs as you become, and remain, a non-smoker. Smokefree.gov allows you to choose the help that best fits your needs.

## 2017-06-23 NOTE — ASSESSMENT & PLAN NOTE
States pain in feet is out of control.  Thinks maybe his blood sugar is elevated.  BS this morning was 156. Describes pain as burning shooting pain in both feet that feels like his feet are numb and tingly. Patient drives a truck throughout the day.

## 2017-07-16 ENCOUNTER — PATIENT MESSAGE (OUTPATIENT)
Dept: MEDICAL GROUP | Facility: PHYSICIAN GROUP | Age: 42
End: 2017-07-16

## 2017-07-17 RX ORDER — GABAPENTIN 600 MG/1
1200 TABLET ORAL 3 TIMES DAILY
Qty: 90 TAB | Refills: 2 | Status: SHIPPED | OUTPATIENT
Start: 2017-07-17 | End: 2017-11-20 | Stop reason: SDUPTHER

## 2017-07-17 NOTE — TELEPHONE ENCOUNTER
Requested Prescriptions     Signed Prescriptions Disp Refills   • gabapentin (NEURONTIN) 600 MG tablet 90 Tab 2     Sig: Take 2 Tabs by mouth 3 times a day.     Authorizing Provider: EFRAIN MILLER A.P.R.N.

## 2017-08-13 ENCOUNTER — PATIENT MESSAGE (OUTPATIENT)
Dept: MEDICAL GROUP | Facility: PHYSICIAN GROUP | Age: 42
End: 2017-08-13

## 2017-08-14 RX ORDER — SILDENAFIL 50 MG/1
50 TABLET, FILM COATED ORAL PRN
Qty: 10 TAB | Refills: 3 | Status: SHIPPED | OUTPATIENT
Start: 2017-08-14 | End: 2017-10-24

## 2017-08-15 ENCOUNTER — TELEPHONE (OUTPATIENT)
Dept: MEDICAL GROUP | Facility: PHYSICIAN GROUP | Age: 42
End: 2017-08-15

## 2017-10-24 ENCOUNTER — OFFICE VISIT (OUTPATIENT)
Dept: MEDICAL GROUP | Facility: PHYSICIAN GROUP | Age: 42
End: 2017-10-24
Payer: COMMERCIAL

## 2017-10-24 VITALS
BODY MASS INDEX: 24.92 KG/M2 | OXYGEN SATURATION: 98 % | TEMPERATURE: 98.1 F | WEIGHT: 188 LBS | SYSTOLIC BLOOD PRESSURE: 124 MMHG | RESPIRATION RATE: 14 BRPM | HEART RATE: 99 BPM | DIASTOLIC BLOOD PRESSURE: 80 MMHG | HEIGHT: 73 IN

## 2017-10-24 DIAGNOSIS — I10 ESSENTIAL HYPERTENSION: ICD-10-CM

## 2017-10-24 DIAGNOSIS — N52.9 ERECTILE DYSFUNCTION, UNSPECIFIED ERECTILE DYSFUNCTION TYPE: ICD-10-CM

## 2017-10-24 DIAGNOSIS — E11.8 TYPE 2 DIABETES MELLITUS WITH COMPLICATION, WITHOUT LONG-TERM CURRENT USE OF INSULIN (HCC): ICD-10-CM

## 2017-10-24 DIAGNOSIS — M79.672 PAIN IN BOTH FEET: ICD-10-CM

## 2017-10-24 DIAGNOSIS — M79.671 PAIN IN BOTH FEET: ICD-10-CM

## 2017-10-24 LAB
HBA1C MFR BLD: 8.6 % (ref ?–5.8)
INT CON NEG: NEGATIVE
INT CON POS: POSITIVE

## 2017-10-24 PROCEDURE — 83036 HEMOGLOBIN GLYCOSYLATED A1C: CPT | Performed by: NURSE PRACTITIONER

## 2017-10-24 PROCEDURE — 99214 OFFICE O/P EST MOD 30 MIN: CPT | Performed by: NURSE PRACTITIONER

## 2017-10-24 RX ORDER — LISINOPRIL 5 MG/1
5 TABLET ORAL DAILY
Qty: 30 TAB | Refills: 6 | Status: SHIPPED | OUTPATIENT
Start: 2017-10-24 | End: 2018-01-24

## 2017-10-24 RX ORDER — NORTRIPTYLINE HYDROCHLORIDE 50 MG/1
50 CAPSULE ORAL
Qty: 60 CAP | Refills: 3 | Status: SHIPPED | OUTPATIENT
Start: 2017-10-24 | End: 2018-01-18

## 2017-10-24 NOTE — ASSESSMENT & PLAN NOTE
This has improved some.  Taking 6 gabapentin noc. States that he will would like to try something else to improve his pain and his sleep. He has not tried any of the amitriptyline family in the past.

## 2017-10-24 NOTE — ASSESSMENT & PLAN NOTE
Blood pressure today 124/80. We stopped the lisinopril several months ago due to low blood pressures however due to his diabetes we will re-put him on lisinopril 5 mg for renal protection.

## 2017-10-24 NOTE — ASSESSMENT & PLAN NOTE
Patient's A1c today is 8.6. This is down from 12.36 months ago. He is taking metformin 1000 mg twice a day. He has had improvement in his erectile dysfunction. He is feeling better. He is exercising daily. He and his wife are following a strict diet. He is monitoring his blood sugars daily. States his average blood sugar is around 135. He stopped taking chart hands due to gastrointestinal distress. He continues to have some neuropathy in his feet however states that this is improving. He continues to take gabapentin for this.

## 2017-10-24 NOTE — PROGRESS NOTES
Chief Complaint   Patient presents with   • Follow-Up     diabetes       Subjective:   Ivan Prescott is a 42 y.o. male here today for evaluation and management of:    Erectile dysfunction  Reports that his erectile dysfunction has improved without viagra.  He is pleased.     Pain in both feet  This has improved some.  Taking 6 gabapentin noc. States that he will would like to try something else to improve his pain and his sleep. He has not tried any of the amitriptyline family in the past.      Uncontrolled type 2 diabetes mellitus with diabetic neuropathy, without long-term current use of insulin ([facility])  Patient's A1c today is 8.6. This is down from 12.36 months ago. He is taking metformin 1000 mg twice a day. He has had improvement in his erectile dysfunction. He is feeling better. He is exercising daily. He and his wife are following a strict diet. He is monitoring his blood sugars daily. States his average blood sugar is around 135. He stopped taking chart hands due to gastrointestinal distress. He continues to have some neuropathy in his feet however states that this is improving. He continues to take gabapentin for this.    Essential hypertension  Blood pressure today 124/80. We stopped the lisinopril several months ago due to low blood pressures however due to his diabetes we will re-put him on lisinopril 5 mg for renal protection.         Current medicines (including changes today)  Current Outpatient Prescriptions   Medication Sig Dispense Refill   • nortriptyline (PAMELOR) 50 MG capsule Take 1 Cap by mouth at bedtime as needed.  May repeat 1 time.. 60 Cap 3   • lisinopril (PRINIVIL) 5 MG Tab Take 1 Tab by mouth every day. 30 Tab 6   • gabapentin (NEURONTIN) 600 MG tablet Take 2 Tabs by mouth 3 times a day. 90 Tab 2   • metformin (GLUCOPHAGE) 1000 MG tablet Take  One 1000 mg tablet bid with meals 60 Tab 6   • gabapentin (NEURONTIN) 600 MG tablet Take 1 Tab by mouth every day. 30 Tab 3   • Blood Glucose  "Monitoring Suppl Device Meter: Dispense Abbott [place] Lite meter. Sig. Use as directed for blood sugar monitoring. 1 Device 0   • Blood Glucose Monitoring Suppl SUPPLIES Misc Test strips order: Test strips for Abbott [place] Lite meter. Sig: use am and pm and prn ssx high or low sugar 100 Each 3   • Lancets Misc Lancets order: Lancets for Abbott [place] Lite meter. Sig: use am and pm and prn ssx high or low sugar. 100 Each 3   • lisinopril (PRINIVIL) 20 MG Tab Take 1 Tab by mouth every day. 90 Tab 3   • ibuprofen (MOTRIN) 200 MG Tab Take 200 mg by mouth every 6 hours as needed.       No current facility-administered medications for this visit.      He  has a past medical history of Allergy; Anxiety; Diabetes (CMS-[facility]); Hypertension; and Migraines.    Gill stated in history of present illness.  No chest pain, no shortness of breath, no abdominal pain       Objective:     Blood pressure 124/80, pulse 99, temperature 36.7 °C (98.1 °F), resp. rate 14, height 1.854 m (6' 1\"), weight 85.3 kg (188 lb), SpO2 98 %. Body mass index is 24.8 kg/m².   Physical Exam:  Constitutional: Alert, no distress.  Skin: Warm, dry, good turgor,no cyanosis, no rashes in visible areas.  Eye: Equal, round and reactive, conjunctiva clear, lids normal.  Ears: No tenderness, no discharge.  External canals are without any significant edema or erythema. Gross auditory acuity is intact.  Nose: symmetrical without tenderness, no discharge.  Mouth/Throat: lips without lesion.  Oropharynx clear.    Neck: Trachea midline, no masses, no obvious thyroid enlargement.. . Range of motion within normal limits.  Neuro: Cranial nerves 2-12 grossly intact.  No sensory deficit.  Respiratory: Unlabored respiratory effort, lungs clear to auscultation, no wheezes, no ronchi.  Cardiovascular: Normal S1, S2, no murmur, no edema.  Abdomen: Soft, non-tender, no masses, no guarding,  no hepatosplenomegaly.  Psych: Alert and oriented x3, normal affect and mood and " judgement.        Assessment and Plan:   The following treatment plan was discussed    1. Erectile dysfunction, unspecified erectile dysfunction type  Ongoing issue. Improved. No longer using Viagra or Cialis. Monitor.    2. Type 2 diabetes mellitus with complication, without long-term current use of insulin (CMS-[facility])  Chronic. Improving. A1c is now down to 8.6 today. Continue with metformin. Continue with good diet and exercise. Patient to return in 3 months.  - POCT  A1C    3. Pain in both feet  Chronic. Ongoing. Continue gabapentin. We will add nortriptyline a small dose at bedtime to see if this improves his pain at night and sleep. Patient to return in 3 months.    4. Uncontrolled type 2 diabetes mellitus with diabetic neuropathy, without long-term current use of insulin ([facility])  See problem #2.    5. Essential hypertension  Chronic. Stable at this time. But pressure at goal. We will add lisinopril 5 mg by mouth daily for      Followup: Return in about 3 months (around 1/24/2018) for Diabetes.

## 2017-11-20 RX ORDER — GABAPENTIN 600 MG/1
1200 TABLET ORAL 3 TIMES DAILY
Qty: 90 TAB | Refills: 2 | Status: SHIPPED | OUTPATIENT
Start: 2017-11-20 | End: 2017-11-20 | Stop reason: SDUPTHER

## 2017-11-20 RX ORDER — GABAPENTIN 600 MG/1
1200 TABLET ORAL 3 TIMES DAILY
Qty: 180 TAB | Refills: 2 | Status: SHIPPED | OUTPATIENT
Start: 2017-11-20 | End: 2018-02-20 | Stop reason: SDUPTHER

## 2017-12-20 ENCOUNTER — PATIENT MESSAGE (OUTPATIENT)
Dept: MEDICAL GROUP | Facility: PHYSICIAN GROUP | Age: 42
End: 2017-12-20

## 2018-01-18 ENCOUNTER — OFFICE VISIT (OUTPATIENT)
Dept: MEDICAL GROUP | Facility: PHYSICIAN GROUP | Age: 43
End: 2018-01-18
Payer: COMMERCIAL

## 2018-01-18 VITALS
OXYGEN SATURATION: 98 % | DIASTOLIC BLOOD PRESSURE: 90 MMHG | HEIGHT: 73 IN | BODY MASS INDEX: 25.05 KG/M2 | HEART RATE: 97 BPM | RESPIRATION RATE: 14 BRPM | WEIGHT: 189 LBS | SYSTOLIC BLOOD PRESSURE: 124 MMHG | TEMPERATURE: 98.1 F

## 2018-01-18 DIAGNOSIS — M79.672 PAIN IN BOTH FEET: ICD-10-CM

## 2018-01-18 DIAGNOSIS — Z86.69 HX OF MIGRAINES: ICD-10-CM

## 2018-01-18 DIAGNOSIS — I10 ESSENTIAL HYPERTENSION: ICD-10-CM

## 2018-01-18 DIAGNOSIS — M79.671 PAIN IN BOTH FEET: ICD-10-CM

## 2018-01-18 LAB
HBA1C MFR BLD: 10.3 % (ref ?–5.8)
INT CON NEG: NEGATIVE
INT CON POS: POSITIVE

## 2018-01-18 PROCEDURE — 83036 HEMOGLOBIN GLYCOSYLATED A1C: CPT | Performed by: NURSE PRACTITIONER

## 2018-01-18 PROCEDURE — 99214 OFFICE O/P EST MOD 30 MIN: CPT | Performed by: NURSE PRACTITIONER

## 2018-01-18 RX ORDER — ONDANSETRON 4 MG/1
4 TABLET, FILM COATED ORAL EVERY 4 HOURS PRN
Qty: 20 TAB | Refills: 1 | Status: SHIPPED | OUTPATIENT
Start: 2018-01-18 | End: 2018-01-24

## 2018-01-18 RX ORDER — ONDANSETRON 4 MG/1
4 TABLET, ORALLY DISINTEGRATING ORAL ONCE
Status: DISCONTINUED | OUTPATIENT
Start: 2018-01-18 | End: 2018-01-18

## 2018-01-18 NOTE — PROGRESS NOTES
Chief Complaint   Patient presents with   • Follow-Up   • Diabetes   • Migraine   • Nausea   • Insomnia       Subjective:   Ivan Prescott is a 42 y.o. male here today for evaluation and management of:    Uncontrolled type 2 diabetes mellitus with diabetic neuropathy, without long-term current use of insulin ([facility])  Reports increased stressors in home and work.  A1c is up to 10.3  States he is down 7 pounds but his A1c is up.  Taking metformin daily.    Not eating regularly.      Essential hypertension  BP back up 124/90.  Will start back on lisinopril.     Hx of migraines  Complains of instant migraine. Lasting for the past 3 weeks. Hasn't taken anything for this.  Not able to sleep, dull ache today, no visual changes.  Pain in back of neck.  No stroke like symptoms reported.  Having some nausea and would like something for this.     Pain in both feet  Gabapentin for this pain. No changes reported.          Current medicines (including changes today)  Current Outpatient Prescriptions   Medication Sig Dispense Refill   • ondansetron (ZOFRAN) 4 MG Tab tablet Take 1 Tab by mouth every four hours as needed for Nausea/Vomiting for up to 10 days. 20 Tab 1   • gabapentin (NEURONTIN) 600 MG tablet Take 2 Tabs by mouth 3 times a day. 180 Tab 2   • lisinopril (PRINIVIL) 5 MG Tab Take 1 Tab by mouth every day. 30 Tab 6   • metformin (GLUCOPHAGE) 1000 MG tablet Take  One 1000 mg tablet bid with meals 60 Tab 6   • Blood Glucose Monitoring Suppl Device Meter: Dispense Abbott [place] Lite meter. Sig. Use as directed for blood sugar monitoring. 1 Device 0   • Blood Glucose Monitoring Suppl SUPPLIES Misc Test strips order: Test strips for Abbott [place] Lite meter. Sig: use am and pm and prn ssx high or low sugar 100 Each 3   • Lancets Misc Lancets order: Lancets for Abbott [place] Lite meter. Sig: use am and pm and prn ssx high or low sugar. 100 Each 3     No current facility-administered medications for this visit.      He  has  "a past medical history of Allergy; Anxiety; Diabetes (CMS-[facility]); Hypertension; and Migraines.    ROS as stated in hpi  No chest pain, no shortness of breath, no abdominal pain       Objective:     Blood pressure 124/90, pulse 97, temperature 36.7 °C (98.1 °F), resp. rate 14, height 1.854 m (6' 1\"), weight 85.7 kg (189 lb), SpO2 98 %. Body mass index is 24.94 kg/m². weight is stable.  BP elevated.   Physical Exam:  Constitutional: Alert, no distress.  Skin: Warm, dry, good turgor,no cyanosis, no rashes in visible areas.  Eye: Equal, round and reactive, conjunctiva clear, lids normal.  Ears: No tenderness, no discharge.  External canals are without any significant edema or erythema.  Tympanic membranes are without any inflammation, no effusion.  Gross auditory acuity is intact.  Nose: symmetrical without tenderness, no discharge.  Mouth/Throat: lips without lesion.  Oropharynx clear.  Throat without erythema, exudates or tonsillar enlargement.  Neck: Trachea midline, no masses, no obvious thyroid enlargement.. No cervical or supraclavicular lymphadenopathy. Range of motion within normal limits.  Neuro: Cranial nerves 2-12 grossly intact.  No sensory deficit.  Respiratory: Unlabored respiratory effort, lungs clear to auscultation, no wheezes, no ronchi.  Cardiovascular: Normal S1, S2, no murmur, no edema.  Abdomen: Soft, non-tender, no masses, no guarding,  no hepatosplenomegaly.  Psych: Alert and oriented x3, normal affect and mood and judgement.        Assessment and Plan:   The following treatment plan was discussed    1. Uncontrolled type 2 diabetes mellitus with diabetic neuropathy, without long-term current use of insulin ([facility])  Chronic.  Uncontrolled.  A1c 10.3  Encourage regular meals/snacks.  Consider referral to endocrinology  - POCT Hemoglobin A1C    2. Essential hypertension  Chronic.  Add lisinopril 5 mg daily.  Return in 4 weeks.     3. Hx of migraines  Chronic.  Ongoing, unstable with recent consistent " headache with migraine features.  Woke him up in the night.  Will order CT to r/o mass or neurological etiology.  - CT-HEAD WITH & W/O; Future    4. Pain in both feet  Chronic.  Ongoing.  Gabapentin tid 100 am, 100 mid day and 400 pm seems to be helping his leg pain).  Return in 4 weeks.  .      Followup: Return in about 4 weeks (around 2/15/2018).

## 2018-01-18 NOTE — ASSESSMENT & PLAN NOTE
Reports increased stressors in home and work.  A1c is up to 10.3  States he is down 7 pounds but his A1c is up.  Taking metformin daily.    Not eating regularly.

## 2018-01-18 NOTE — ASSESSMENT & PLAN NOTE
Complains of instant migraine. Lasting for the past 3 weeks. Hasn't taken anything for this.  Not able to sleep, dull ache today, no visual changes.  Pain in back of neck.  No stroke like symptoms reported.  Having some nausea and would like something for this.

## 2018-01-22 DIAGNOSIS — E11.42 TYPE 2 DIABETES MELLITUS WITH DIABETIC POLYNEUROPATHY, WITHOUT LONG-TERM CURRENT USE OF INSULIN (HCC): ICD-10-CM

## 2018-01-24 ENCOUNTER — HOSPITAL ENCOUNTER (OUTPATIENT)
Facility: MEDICAL CENTER | Age: 43
End: 2018-01-26
Attending: EMERGENCY MEDICINE | Admitting: HOSPITALIST
Payer: COMMERCIAL

## 2018-01-24 ENCOUNTER — APPOINTMENT (OUTPATIENT)
Dept: RADIOLOGY | Facility: MEDICAL CENTER | Age: 43
End: 2018-01-24
Attending: EMERGENCY MEDICINE
Payer: COMMERCIAL

## 2018-01-24 ENCOUNTER — RESOLUTE PROFESSIONAL BILLING HOSPITAL PROF FEE (OUTPATIENT)
Dept: HOSPITALIST | Facility: MEDICAL CENTER | Age: 43
End: 2018-01-24
Payer: COMMERCIAL

## 2018-01-24 DIAGNOSIS — R51.9 NONINTRACTABLE HEADACHE, UNSPECIFIED CHRONICITY PATTERN, UNSPECIFIED HEADACHE TYPE: ICD-10-CM

## 2018-01-24 DIAGNOSIS — E87.20 LACTIC ACIDOSIS: ICD-10-CM

## 2018-01-24 DIAGNOSIS — R56.9 SEIZURE (HCC): ICD-10-CM

## 2018-01-24 PROBLEM — E11.9 DM (DIABETES MELLITUS) (HCC): Status: ACTIVE | Noted: 2018-01-24

## 2018-01-24 PROBLEM — G43.909 MIGRAINE: Status: ACTIVE | Noted: 2018-01-24

## 2018-01-24 LAB
ALBUMIN SERPL BCP-MCNC: 3.4 G/DL (ref 3.2–4.9)
ALBUMIN/GLOB SERPL: 1.1 G/DL
ALP SERPL-CCNC: 91 U/L (ref 30–99)
ALT SERPL-CCNC: 40 U/L (ref 2–50)
AMPHET UR QL SCN: NEGATIVE
ANION GAP SERPL CALC-SCNC: 10 MMOL/L (ref 0–11.9)
APTT PPP: 26.6 SEC (ref 24.7–36)
AST SERPL-CCNC: 22 U/L (ref 12–45)
BARBITURATES UR QL SCN: NEGATIVE
BASOPHILS # BLD AUTO: 0.3 % (ref 0–1.8)
BASOPHILS # BLD: 0.02 K/UL (ref 0–0.12)
BENZODIAZ UR QL SCN: NEGATIVE
BILIRUB SERPL-MCNC: 0.6 MG/DL (ref 0.1–1.5)
BUN SERPL-MCNC: 16 MG/DL (ref 8–22)
BZE UR QL SCN: NEGATIVE
CALCIUM SERPL-MCNC: 9.3 MG/DL (ref 8.5–10.5)
CANNABINOIDS UR QL SCN: NEGATIVE
CHLORIDE SERPL-SCNC: 105 MMOL/L (ref 96–112)
CO2 SERPL-SCNC: 21 MMOL/L (ref 20–33)
CREAT SERPL-MCNC: 0.93 MG/DL (ref 0.5–1.4)
EOSINOPHIL # BLD AUTO: 0.18 K/UL (ref 0–0.51)
EOSINOPHIL NFR BLD: 2.7 % (ref 0–6.9)
ERYTHROCYTE [DISTWIDTH] IN BLOOD BY AUTOMATED COUNT: 37.1 FL (ref 35.9–50)
GLOBULIN SER CALC-MCNC: 3 G/DL (ref 1.9–3.5)
GLUCOSE BLD-MCNC: 288 MG/DL (ref 65–99)
GLUCOSE BLD-MCNC: 314 MG/DL (ref 65–99)
GLUCOSE SERPL-MCNC: 303 MG/DL (ref 65–99)
HCT VFR BLD AUTO: 44.3 % (ref 42–52)
HGB BLD-MCNC: 15.7 G/DL (ref 14–18)
IMM GRANULOCYTES # BLD AUTO: 0.02 K/UL (ref 0–0.11)
IMM GRANULOCYTES NFR BLD AUTO: 0.3 % (ref 0–0.9)
INR PPP: 0.94 (ref 0.87–1.13)
LACTATE BLD-SCNC: 3.7 MMOL/L (ref 0.5–2)
LYMPHOCYTES # BLD AUTO: 2.05 K/UL (ref 1–4.8)
LYMPHOCYTES NFR BLD: 30.9 % (ref 22–41)
MCH RBC QN AUTO: 29.8 PG (ref 27–33)
MCHC RBC AUTO-ENTMCNC: 35.4 G/DL (ref 33.7–35.3)
MCV RBC AUTO: 84.1 FL (ref 81.4–97.8)
METHADONE UR QL SCN: NEGATIVE
MONOCYTES # BLD AUTO: 0.68 K/UL (ref 0–0.85)
MONOCYTES NFR BLD AUTO: 10.3 % (ref 0–13.4)
NEUTROPHILS # BLD AUTO: 3.68 K/UL (ref 1.82–7.42)
NEUTROPHILS NFR BLD: 55.5 % (ref 44–72)
NRBC # BLD AUTO: 0 K/UL
NRBC BLD-RTO: 0 /100 WBC
OPIATES UR QL SCN: POSITIVE
OXYCODONE UR QL SCN: NEGATIVE
PCP UR QL SCN: NEGATIVE
PLATELET # BLD AUTO: 257 K/UL (ref 164–446)
PMV BLD AUTO: 9.1 FL (ref 9–12.9)
POTASSIUM SERPL-SCNC: 4 MMOL/L (ref 3.6–5.5)
PROLACTIN SERPL-MCNC: 12.23 NG/ML (ref 2.1–17.7)
PROPOXYPH UR QL SCN: NEGATIVE
PROT SERPL-MCNC: 6.4 G/DL (ref 6–8.2)
PROTHROMBIN TIME: 12.3 SEC (ref 12–14.6)
RBC # BLD AUTO: 5.27 M/UL (ref 4.7–6.1)
SODIUM SERPL-SCNC: 136 MMOL/L (ref 135–145)
TSH SERPL DL<=0.005 MIU/L-ACNC: 1.6 UIU/ML (ref 0.38–5.33)
WBC # BLD AUTO: 6.6 K/UL (ref 4.8–10.8)

## 2018-01-24 PROCEDURE — 95951 EEG: CPT | Mod: 52

## 2018-01-24 PROCEDURE — 700111 HCHG RX REV CODE 636 W/ 250 OVERRIDE (IP): Performed by: EMERGENCY MEDICINE

## 2018-01-24 PROCEDURE — 70450 CT HEAD/BRAIN W/O DYE: CPT

## 2018-01-24 PROCEDURE — 96375 TX/PRO/DX INJ NEW DRUG ADDON: CPT

## 2018-01-24 PROCEDURE — 85730 THROMBOPLASTIN TIME PARTIAL: CPT

## 2018-01-24 PROCEDURE — G0378 HOSPITAL OBSERVATION PER HR: HCPCS

## 2018-01-24 PROCEDURE — 82962 GLUCOSE BLOOD TEST: CPT

## 2018-01-24 PROCEDURE — 83605 ASSAY OF LACTIC ACID: CPT

## 2018-01-24 PROCEDURE — 700102 HCHG RX REV CODE 250 W/ 637 OVERRIDE(OP): Performed by: HOSPITALIST

## 2018-01-24 PROCEDURE — 84443 ASSAY THYROID STIM HORMONE: CPT

## 2018-01-24 PROCEDURE — 99285 EMERGENCY DEPT VISIT HI MDM: CPT

## 2018-01-24 PROCEDURE — 71045 X-RAY EXAM CHEST 1 VIEW: CPT

## 2018-01-24 PROCEDURE — 99220 PR INITIAL OBSERVATION CARE,LEVL III: CPT | Performed by: HOSPITALIST

## 2018-01-24 PROCEDURE — 85610 PROTHROMBIN TIME: CPT

## 2018-01-24 PROCEDURE — 80307 DRUG TEST PRSMV CHEM ANLYZR: CPT

## 2018-01-24 PROCEDURE — 36415 COLL VENOUS BLD VENIPUNCTURE: CPT

## 2018-01-24 PROCEDURE — 96374 THER/PROPH/DIAG INJ IV PUSH: CPT

## 2018-01-24 PROCEDURE — 85025 COMPLETE CBC W/AUTO DIFF WBC: CPT

## 2018-01-24 PROCEDURE — A9270 NON-COVERED ITEM OR SERVICE: HCPCS | Performed by: HOSPITALIST

## 2018-01-24 PROCEDURE — 84146 ASSAY OF PROLACTIN: CPT

## 2018-01-24 PROCEDURE — 80053 COMPREHEN METABOLIC PANEL: CPT

## 2018-01-24 PROCEDURE — 700105 HCHG RX REV CODE 258: Performed by: HOSPITALIST

## 2018-01-24 RX ORDER — AMOXICILLIN 250 MG
2 CAPSULE ORAL 2 TIMES DAILY
Status: DISCONTINUED | OUTPATIENT
Start: 2018-01-25 | End: 2018-01-26 | Stop reason: HOSPADM

## 2018-01-24 RX ORDER — GABAPENTIN 400 MG/1
1200 CAPSULE ORAL 3 TIMES DAILY
Status: DISCONTINUED | OUTPATIENT
Start: 2018-01-24 | End: 2018-01-26 | Stop reason: HOSPADM

## 2018-01-24 RX ORDER — SODIUM CHLORIDE 9 MG/ML
INJECTION, SOLUTION INTRAVENOUS CONTINUOUS
Status: DISCONTINUED | OUTPATIENT
Start: 2018-01-24 | End: 2018-01-26 | Stop reason: HOSPADM

## 2018-01-24 RX ORDER — DEXTROSE MONOHYDRATE 25 G/50ML
25 INJECTION, SOLUTION INTRAVENOUS
Status: DISCONTINUED | OUTPATIENT
Start: 2018-01-24 | End: 2018-01-26 | Stop reason: HOSPADM

## 2018-01-24 RX ORDER — LISINOPRIL 5 MG/1
5 TABLET ORAL
Status: DISCONTINUED | OUTPATIENT
Start: 2018-01-24 | End: 2018-01-26 | Stop reason: HOSPADM

## 2018-01-24 RX ORDER — HYDROMORPHONE HYDROCHLORIDE 2 MG/ML
0.5 INJECTION, SOLUTION INTRAMUSCULAR; INTRAVENOUS; SUBCUTANEOUS
Status: DISCONTINUED | OUTPATIENT
Start: 2018-01-24 | End: 2018-01-26 | Stop reason: HOSPADM

## 2018-01-24 RX ORDER — OXYCODONE HYDROCHLORIDE 10 MG/1
10 TABLET ORAL
Status: DISCONTINUED | OUTPATIENT
Start: 2018-01-24 | End: 2018-01-26 | Stop reason: HOSPADM

## 2018-01-24 RX ORDER — POLYETHYLENE GLYCOL 3350 17 G/17G
1 POWDER, FOR SOLUTION ORAL
Status: DISCONTINUED | OUTPATIENT
Start: 2018-01-24 | End: 2018-01-26 | Stop reason: HOSPADM

## 2018-01-24 RX ORDER — LORAZEPAM 2 MG/ML
4 INJECTION INTRAMUSCULAR
Status: DISCONTINUED | OUTPATIENT
Start: 2018-01-24 | End: 2018-01-26 | Stop reason: HOSPADM

## 2018-01-24 RX ORDER — LORAZEPAM 1 MG/1
0.5 TABLET ORAL EVERY 6 HOURS PRN
Status: DISCONTINUED | OUTPATIENT
Start: 2018-01-24 | End: 2018-01-26 | Stop reason: HOSPADM

## 2018-01-24 RX ORDER — ONDANSETRON 2 MG/ML
4 INJECTION INTRAMUSCULAR; INTRAVENOUS EVERY 4 HOURS PRN
Status: DISCONTINUED | OUTPATIENT
Start: 2018-01-24 | End: 2018-01-26 | Stop reason: HOSPADM

## 2018-01-24 RX ORDER — PROMETHAZINE HYDROCHLORIDE 25 MG/1
12.5-25 SUPPOSITORY RECTAL EVERY 4 HOURS PRN
Status: DISCONTINUED | OUTPATIENT
Start: 2018-01-24 | End: 2018-01-26 | Stop reason: HOSPADM

## 2018-01-24 RX ORDER — LISINOPRIL 5 MG/1
5 TABLET ORAL DAILY
COMMUNITY
End: 2018-03-15

## 2018-01-24 RX ORDER — LORAZEPAM 2 MG/ML
0.5 INJECTION INTRAMUSCULAR EVERY 6 HOURS PRN
Status: DISCONTINUED | OUTPATIENT
Start: 2018-01-24 | End: 2018-01-26 | Stop reason: HOSPADM

## 2018-01-24 RX ORDER — BISACODYL 10 MG
10 SUPPOSITORY, RECTAL RECTAL
Status: DISCONTINUED | OUTPATIENT
Start: 2018-01-24 | End: 2018-01-26 | Stop reason: HOSPADM

## 2018-01-24 RX ORDER — ONDANSETRON 4 MG/1
4 TABLET, ORALLY DISINTEGRATING ORAL EVERY 4 HOURS PRN
Status: DISCONTINUED | OUTPATIENT
Start: 2018-01-24 | End: 2018-01-26 | Stop reason: HOSPADM

## 2018-01-24 RX ORDER — PROMETHAZINE HYDROCHLORIDE 25 MG/1
12.5-25 TABLET ORAL EVERY 4 HOURS PRN
Status: DISCONTINUED | OUTPATIENT
Start: 2018-01-24 | End: 2018-01-26 | Stop reason: HOSPADM

## 2018-01-24 RX ORDER — OXYCODONE HYDROCHLORIDE 5 MG/1
5 TABLET ORAL
Status: DISCONTINUED | OUTPATIENT
Start: 2018-01-24 | End: 2018-01-26 | Stop reason: HOSPADM

## 2018-01-24 RX ORDER — BUTALBITAL, ACETAMINOPHEN AND CAFFEINE 50; 325; 40 MG/1; MG/1; MG/1
1 TABLET ORAL EVERY 6 HOURS PRN
Status: DISCONTINUED | OUTPATIENT
Start: 2018-01-24 | End: 2018-01-24

## 2018-01-24 RX ORDER — DIVALPROEX SODIUM 500 MG/1
500 TABLET, EXTENDED RELEASE ORAL DAILY
Status: COMPLETED | OUTPATIENT
Start: 2018-01-24 | End: 2018-01-24

## 2018-01-24 RX ORDER — ACETAMINOPHEN 325 MG/1
650 TABLET ORAL EVERY 6 HOURS PRN
Status: DISCONTINUED | OUTPATIENT
Start: 2018-01-24 | End: 2018-01-24

## 2018-01-24 RX ORDER — ONDANSETRON 2 MG/ML
4 INJECTION INTRAMUSCULAR; INTRAVENOUS ONCE
Status: COMPLETED | OUTPATIENT
Start: 2018-01-24 | End: 2018-01-24

## 2018-01-24 RX ORDER — HYDROMORPHONE HYDROCHLORIDE 2 MG/ML
1 INJECTION, SOLUTION INTRAMUSCULAR; INTRAVENOUS; SUBCUTANEOUS ONCE
Status: COMPLETED | OUTPATIENT
Start: 2018-01-24 | End: 2018-01-24

## 2018-01-24 RX ADMIN — GABAPENTIN 1200 MG: 400 CAPSULE ORAL at 16:39

## 2018-01-24 RX ADMIN — GABAPENTIN 1200 MG: 400 CAPSULE ORAL at 20:15

## 2018-01-24 RX ADMIN — SODIUM CHLORIDE: 9 INJECTION, SOLUTION INTRAVENOUS at 15:48

## 2018-01-24 RX ADMIN — OXYCODONE HYDROCHLORIDE 5 MG: 5 TABLET ORAL at 15:47

## 2018-01-24 RX ADMIN — BUTALBITAL, ACETAMINOPHEN, AND CAFFEINE 1 TABLET: 50; 325; 40 TABLET ORAL at 13:36

## 2018-01-24 RX ADMIN — HYDROMORPHONE HYDROCHLORIDE 1 MG: 2 INJECTION INTRAMUSCULAR; INTRAVENOUS; SUBCUTANEOUS at 10:14

## 2018-01-24 RX ADMIN — OXYCODONE HYDROCHLORIDE 10 MG: 10 TABLET ORAL at 20:15

## 2018-01-24 RX ADMIN — INSULIN HUMAN 10 UNITS: 100 INJECTION, SOLUTION PARENTERAL at 20:16

## 2018-01-24 RX ADMIN — DIVALPROEX SODIUM 500 MG: 500 TABLET, EXTENDED RELEASE ORAL at 16:39

## 2018-01-24 RX ADMIN — ONDANSETRON 4 MG: 2 INJECTION INTRAMUSCULAR; INTRAVENOUS at 10:13

## 2018-01-24 ASSESSMENT — COGNITIVE AND FUNCTIONAL STATUS - GENERAL
DAILY ACTIVITIY SCORE: 24
MOBILITY SCORE: 24
SUGGESTED CMS G CODE MODIFIER DAILY ACTIVITY: CH
SUGGESTED CMS G CODE MODIFIER MOBILITY: CH

## 2018-01-24 ASSESSMENT — LIFESTYLE VARIABLES
DO YOU DRINK ALCOHOL: NO
DO YOU DRINK ALCOHOL: NO
EVER_SMOKED: NEVER

## 2018-01-24 ASSESSMENT — PATIENT HEALTH QUESTIONNAIRE - PHQ9
SUM OF ALL RESPONSES TO PHQ QUESTIONS 1-9: 0
SUM OF ALL RESPONSES TO PHQ9 QUESTIONS 1 AND 2: 0
1. LITTLE INTEREST OR PLEASURE IN DOING THINGS: NOT AT ALL
2. FEELING DOWN, DEPRESSED, IRRITABLE, OR HOPELESS: NOT AT ALL

## 2018-01-24 ASSESSMENT — PAIN SCALES - GENERAL
PAINLEVEL_OUTOF10: 8
PAINLEVEL_OUTOF10: 9

## 2018-01-24 NOTE — ED PROVIDER NOTES
ED Provider Note    CHIEF COMPLAINT  Chief Complaint   Patient presents with   • Head Ache   • Seizure     like activity at work, no hx.  no posticital, no oral trauma, no incontinence       HPI  Ivan Prescott is a 42 y.o. male who presents complaining of a mild to moderate bifrontal headache over the past few days. He is concerned that he may have had a seizure. It was unwitnessed. Since he been under a lot of stress recently. Apparently was going into his family doctor sometime in the next couple months asked for a CAT scan. He said he said migraines for years but he's never been diagnosed and never had a CAT scan. No chest pain or difficulty breathing. No abdominal pain. He is now hungry. History of anxiety disorder    REVIEW OF SYSTEMS  No jaw pain, no chest pain, no difficulty breathing. No abdominal pain.  ALL OTHER SYSTEMS NEGATIVE    ALLERGIES  No Known Allergies    CURRENT MEDICATIONS  No current medication    PAST MEDICAL HISTORY  Past Medical History:   Diagnosis Date   • Allergy    • Anxiety    • Diabetes (CMS-[facility])    • Hypertension    • Migraines        SURGICAL HISTORY  Past Surgical History:   Procedure Laterality Date   • EYE SURGERY     • KNEE ARTHROTOMY      plica removal 2016       FAMILY HISTORY  Family History   Problem Relation Age of Onset   • No Known Problems Mother    • No Known Problems Father    • No Known Problems Sister    • Diabetes Paternal Grandmother        SOCIAL HISTORY  Social History     Social History   • Marital status:      Spouse name: N/A   • Number of children: N/A   • Years of education: N/A     Social History Main Topics   • Smoking status: Never Smoker   • Smokeless tobacco: Current User     Types: Chew   • Alcohol use No   • Drug use: No   • Sexual activity: Yes     Partners: Female     Other Topics Concern   • Not on file     Social History Narrative   • No narrative on file       PHYSICAL EXAM  GENERAL: Alert male adult  VITAL SIGNS: /103   Pulse 88   " Temp 36.7 °C (98.1 °F)   Resp 18   Ht 1.867 m (6' 1.5\")   Wt 86.2 kg (190 lb)   SpO2 99%   BMI 24.73 kg/m²    Constitutional: Alert healthy-appearing adult HENT: Scalp is normal size and nontender. Ears are clear. Nose is clear. Throat is clear with no stridor no drooling no trismus. Teeth are all intact.  Eyes: Pupils equal round and reactive to light, extraocular motor fall. There is no scleral icterus.  Neck: Neck is supple and nontender. There is no meningismus. No adenitis. No thyromegaly.  Lymphatic: No adenopathy.   Cardiovascular: Heart regular rhythm without murmurs or gallops   Thorax & Lungs: No chest wall tenderness. Lungs are clear. Patient has good breath sounds bilateral. No rales, no rhonchi, no wheezes.  Abdomen: Abdomen is soft, nontender, not rigid, no guarding, and no organomegaly. There is no palpable hernia   Skin: Warm, pink, and dry with no erythema and no rash.   Back: Nontender, no midline bony tenderness, no flank tenderness.  Extremities: Full range of motion  No tenderness to palpation and no deformities noted. No calf or thigh swelling. No calf or thigh tenderness. No clinical DVT.  Neurologic: Alert & oriented . Cranial nerves are grossly intact as tested. Patient moves all 4 extremities well. Patient has good strong flexion and extension of the ankle joints knee joints hip joints and elbow joints. Sensation is normal and symmetrical in the upper and lower extremities.   Psychiatric: Patient is alert oriented coherent and rational.       RADIOLOGY/PROCEDURES  DX-CHEST-PORTABLE (1 VIEW)   Final Result      Hypoinflation without other evidence for acute cardiopulmonary disease.      CT-HEAD W/O   Final Result      No acute intracranial abnormality.            COURSE & MEDICAL DECISION MAKING  Patient's had headache for years but he's never been diagnosed with migraines. He's had these increasing headaches. Comes here for evaluation of the headaches.    Plan: #1 IV #2 intravenous " Zofran and Dilaudid No. 3. Cardiac monitor, pulse ox monitor, blood pressure monitor. #4. CT of the head #5. Lab evaluation.    Laboratory and reexamination: Chest x-ray shows no acute change. CT of the head is normal. White count 6000. Lactate levels elevated at 3.7. Glucose is elevated.    Results for orders placed or performed during the hospital encounter of 01/24/18   CBC WITH DIFFERENTIAL   Result Value Ref Range    WBC 6.6 4.8 - 10.8 K/uL    RBC 5.27 4.70 - 6.10 M/uL    Hemoglobin 15.7 14.0 - 18.0 g/dL    Hematocrit 44.3 42.0 - 52.0 %    MCV 84.1 81.4 - 97.8 fL    MCH 29.8 27.0 - 33.0 pg    MCHC 35.4 (H) 33.7 - 35.3 g/dL    RDW 37.1 35.9 - 50.0 fL    Platelet Count 257 164 - 446 K/uL    MPV 9.1 9.0 - 12.9 fL    Neutrophils-Polys 55.50 44.00 - 72.00 %    Lymphocytes 30.90 22.00 - 41.00 %    Monocytes 10.30 0.00 - 13.40 %    Eosinophils 2.70 0.00 - 6.90 %    Basophils 0.30 0.00 - 1.80 %    Immature Granulocytes 0.30 0.00 - 0.90 %    Nucleated RBC 0.00 /100 WBC    Neutrophils (Absolute) 3.68 1.82 - 7.42 K/uL    Lymphs (Absolute) 2.05 1.00 - 4.80 K/uL    Monos (Absolute) 0.68 0.00 - 0.85 K/uL    Eos (Absolute) 0.18 0.00 - 0.51 K/uL    Baso (Absolute) 0.02 0.00 - 0.12 K/uL    Immature Granulocytes (abs) 0.02 0.00 - 0.11 K/uL    NRBC (Absolute) 0.00 K/uL   COMP METABOLIC PANEL   Result Value Ref Range    Sodium 136 135 - 145 mmol/L    Potassium 4.0 3.6 - 5.5 mmol/L    Chloride 105 96 - 112 mmol/L    Co2 21 20 - 33 mmol/L    Anion Gap 10.0 0.0 - 11.9    Glucose 303 (H) 65 - 99 mg/dL    Bun 16 8 - 22 mg/dL    Creatinine 0.93 0.50 - 1.40 mg/dL    Calcium 9.3 8.5 - 10.5 mg/dL    AST(SGOT) 22 12 - 45 U/L    ALT(SGPT) 40 2 - 50 U/L    Alkaline Phosphatase 91 30 - 99 U/L    Total Bilirubin 0.6 0.1 - 1.5 mg/dL    Albumin 3.4 3.2 - 4.9 g/dL    Total Protein 6.4 6.0 - 8.2 g/dL    Globulin 3.0 1.9 - 3.5 g/dL    A-G Ratio 1.1 g/dL   ESTIMATED GFR   Result Value Ref Range    GFR If African American >60 >60 mL/min/1.73 m 2     GFR If Non African American >60 >60 mL/min/1.73 m 2   PROTHROMBIN TIME   Result Value Ref Range    PT 12.3 12.0 - 14.6 sec    INR 0.94 0.87 - 1.13   APTT   Result Value Ref Range    APTT 26.6 24.7 - 36.0 sec   LACTIC ACID   Result Value Ref Range    Lactic Acid 3.7 (H) 0.5 - 2.0 mmol/L      Patient has a headache and then feel well and he has an elevated lactate level. His white count is normal with no bandemia. He'll be admitted for IV fluids and antibiotics. The hospitalist has been called. At this time he has no sign of meningitis.  FINAL IMPRESSION  1. Headache  2. . Lactate  3. Early sepsis      Electronically signed by: Gary Gansert, 1/24/2018 9:46 AM

## 2018-01-24 NOTE — ED NOTES
Pt sitting up in [facility] using cell phone.  No needs voiced.  Call light in reach.  Awaiting bed assignment.

## 2018-01-24 NOTE — ED NOTES
"Pt having seizure like activity for less than 1 minute.  Pt O2 96-99% on RA during activity.  Once shaking stopped pt O2 decreased to 83%, pt on NC.  ERP called and informed.  Pt was laying on left side during episode and pt jerked up right arm and A/O x3.  Pt asking what happened.  Pt holding his head c/o head ache, feet pain and body pain.  Pt reports he feels \"tight\" all over.  No incontinence, pt stating \"I'm not 2\".  Pt reports he bit his tongue on the right side, no bleeding noted.  Pt mother and wife at bedside.  "

## 2018-01-24 NOTE — H&P
OUTPATIENT PROVIDER:  JEM Oshea    CHIEF COMPLAINT:  Headache and seizure.    HISTORY OF PRESENT ILLNESS:  This is a 42-year-old male with chronic headaches   apparently, but more so and severe over the last 3 weeks.  Daily headaches   are reported.  He reports that those feel like migraine headaches.  He was   never officially diagnosed.  He knows the difference between tension headaches   and migraine headaches.  Per his report, he is sensitive to light.  Today, he   woke up, went to work, had a headache and suddenly apparently had a fall and   shaking spell.  He woke up in the ambulance.  Per report, he had a witnessed   seizure there, but there is no specific report from coworkers.  Patient in the   field had a blood sugar of 359.  The patient was brought to the emergency   room, is seen by the ER physician who did a workup including a CAT scan and   laboratory workup, which was so far negative except for abnormal lactic acid.    The patient then per report in the ER had another witnessed seizure spell and   the patient at this time has a severe headache.  There was no postictal state   here in the ER.  The patient does not remember the occurrence at work.    Again, he remembers only being at work at his workplace and then waking up in   the ambulance.  The patient never had seizures before.  There is no prior loss   of consciousness or other trauma associated.  The patient denies history of   head injury.  The patient at this time complains of light sensitivity and   ongoing headaches, currently frontal starting on the right, now bilateral   frontal lobes.  He a lot of times has posterior headaches.  He does have an   aura he usually knows when his headache is coming on, a lot of times it helps   when he lays in a dark room and is quiet.  The patient at this time is   otherwise accompanied by his family here giving some additional history.    ALLERGIES:  No known drug allergies.    MEDICATION  REGIMEN:  1.  Neurontin 1200 mg p.o. t.i.d. for diabetic neuropathy.  2.  Metformin 1000 mg p.o. b.i.d.    PAST MEDICAL HISTORY:  1.  Diabetes with recent poor control and A1c of 10.3.  2.  Diabetic neuropathy.  3.  Hypertension.  4.  History of chronic headaches.    PAST SURGICAL HISTORY:  1.  History of orbital fracture repair.  2.  History of knee surgeries.    SOCIAL HISTORY:  The patient chews tobacco, alcohol and drugs none.  He is   .    FAMILY HISTORY:  Positive for migraines, diabetes, cancer and alcoholism.    REVIEW OF SYSTEMS:  Negative for AMA and CMS criteria other than outlined in   HPI.    PHYSICAL EXAMINATION:  VITAL SIGNS:  The patient is found to have temperature 36.7, pulse 103,   respirations 22, and blood pressure 144/103.  The patient is saturating well   on room air.  GENERAL:  This is a  male in no acute distress, no acute respiratory   distress, well developed, well nourished.  HEENT:  Normocephalic, atraumatic.  EOMI.  PERRLA. Mucous membranes moist.    Nasopharynx otherwise clear.  NECK:  Stiff range of motion.  No lymphadenopathy or thyromegaly.  CHEST:  Normal bony structures.  LUNGS:  Clear to auscultation anteriorly.  HEART:  Regular rate.  S1, S2 normal.  LUNGS:  Without wheezes, rales, or rhonchi.  ABDOMEN:  Flat, soft.  No tenderness, no distention.  Positive bowel sounds in   all 4 quadrants.  No hepatosplenomegaly is appreciated.  GENITOURINARY:  Normal external male genitalia.  RECTAL:  Exam is deferred.  MUSCULOSKELETAL:  No clubbing, cyanosis, or edema.  NEUROLOGIC:  The patient is alert and oriented x4.  Cranial nerves II-XII are   grossly intact.  No sensory loss is elicited.  The patient reports that his   right small finger is numb since this morning.  This has been getting somewhat   better he states.  There is no objective sensory loss felt.  Patient's   strength is 5/5 throughout.  SKIN:  Orienting intact.  Multiple tattoos.    LABORATORY DATA AND  IMAGING:  CBC is essentially normal.  Chemistry normal   except for glucose of 303, a bicarbonate of 21, and a gap of 10.  Lactic acid   3.7.  Coagulation normal.  CT of the head without abnormalities, noncontrast.    There was a prior right orbit reconstruction seen.  His chest x-ray shows no   acute infiltrates.    ASSESSMENT AND PLAN:  1.  Migraine headaches, uncontrolled, associated with possible seizure event.    The patient at this time will be admitted.  He will be undergoing MRI   scanning, EEG, and referral to neurology for treatment for migraines the   patient is suffering from chronically, avoiding hopefully narcotics.  We will   start the patient on some gentle pain medication and await further   recommendations.  2.  Question of seizure event x2 today, possibly could be related to his   migraines versus other.  Patient will have an EEG and MRI performed.    Neurology evaluation.  3.  Poorly controlled diabetes.  We will continue to control, avoiding   metformin since patient will be getting some contrast.  He will be treated   with insulin at this time, likely need some medication adjustment on   discharge.  4.  Diabetic neuropathy.  5.  Hypertension.  We will start lisinopril, the patient was supposed to start   as an outpatient.  6.  Increased lactic acid, possibly secondary to seizure event versus other.    Considering also his uncontrolled blood sugar, we will follow up.  There is   currently no evidence of infection.    OVERALL PLAN:  The patient is admitted with uncontrolled migraines, question   of seizure event and uncontrolled diabetes with laboratory abnormalities as   outlined.  The patient will likely require additional testing at this time,   admitted for observation until neurology has seen the patient.    Time spent 55 minutes.       ____________________________________     MD CARLOS YOUNGBLOOD / JAMEY    DD:  01/24/2018 12:43:21  DT:  01/24/2018 13:46:17    D#:  [id number]  Job#:   [id number]

## 2018-01-24 NOTE — ED NOTES
Pt BIB EMS From work.  Pt reports HA for the last 3 wks and has been seen by his PCP and CT and blood work ordered by PCP.  Pt reports he continue to have HA this morning, no vision changes or dizziness, and was sitting in a chair at a table and it was reported that pt fell and was shaking.  Pt wasn't postitical after shaking, no oral trauma or incontinence.  Pt has no h/o seizures.  Pt A/o x4, pt has choppy speech but as pt continues to talk pt starts to talk normal.  Pt  for EMS and was given 200 NS.  Pt has hx neuropathy and DM.  Pt c/o numbness to right pinky.  Pt reports neuropathy to bilateral LE, baseline for pt.  ERP to see.

## 2018-01-25 ENCOUNTER — APPOINTMENT (OUTPATIENT)
Dept: RADIOLOGY | Facility: MEDICAL CENTER | Age: 43
End: 2018-01-25
Attending: PSYCHIATRY & NEUROLOGY
Payer: COMMERCIAL

## 2018-01-25 ENCOUNTER — PATIENT MESSAGE (OUTPATIENT)
Dept: MEDICAL GROUP | Facility: PHYSICIAN GROUP | Age: 43
End: 2018-01-25

## 2018-01-25 PROBLEM — R51.9 HEADACHE: Status: ACTIVE | Noted: 2018-01-25

## 2018-01-25 PROBLEM — R11.2 NAUSEA AND VOMITING: Status: ACTIVE | Noted: 2018-01-25

## 2018-01-25 LAB
ALBUMIN SERPL BCP-MCNC: 3.4 G/DL (ref 3.2–4.9)
ALBUMIN/GLOB SERPL: 1.3 G/DL
ALP SERPL-CCNC: 80 U/L (ref 30–99)
ALT SERPL-CCNC: 33 U/L (ref 2–50)
ANION GAP SERPL CALC-SCNC: 9 MMOL/L (ref 0–11.9)
AST SERPL-CCNC: 18 U/L (ref 12–45)
BASOPHILS # BLD AUTO: 0.8 % (ref 0–1.8)
BASOPHILS # BLD: 0.06 K/UL (ref 0–0.12)
BILIRUB SERPL-MCNC: 0.5 MG/DL (ref 0.1–1.5)
BUN SERPL-MCNC: 16 MG/DL (ref 8–22)
CALCIUM SERPL-MCNC: 8.8 MG/DL (ref 8.5–10.5)
CHLORIDE SERPL-SCNC: 106 MMOL/L (ref 96–112)
CHOLEST SERPL-MCNC: 166 MG/DL (ref 100–199)
CO2 SERPL-SCNC: 26 MMOL/L (ref 20–33)
CREAT SERPL-MCNC: 1.04 MG/DL (ref 0.5–1.4)
EOSINOPHIL # BLD AUTO: 0.27 K/UL (ref 0–0.51)
EOSINOPHIL NFR BLD: 3.4 % (ref 0–6.9)
ERYTHROCYTE [DISTWIDTH] IN BLOOD BY AUTOMATED COUNT: 38.2 FL (ref 35.9–50)
GLOBULIN SER CALC-MCNC: 2.7 G/DL (ref 1.9–3.5)
GLUCOSE BLD-MCNC: 145 MG/DL (ref 65–99)
GLUCOSE BLD-MCNC: 173 MG/DL (ref 65–99)
GLUCOSE BLD-MCNC: 231 MG/DL (ref 65–99)
GLUCOSE BLD-MCNC: 293 MG/DL (ref 65–99)
GLUCOSE SERPL-MCNC: 143 MG/DL (ref 65–99)
HCT VFR BLD AUTO: 43.4 % (ref 42–52)
HDLC SERPL-MCNC: 31 MG/DL
HGB BLD-MCNC: 14.5 G/DL (ref 14–18)
IMM GRANULOCYTES # BLD AUTO: 0.02 K/UL (ref 0–0.11)
IMM GRANULOCYTES NFR BLD AUTO: 0.3 % (ref 0–0.9)
LDLC SERPL CALC-MCNC: 105 MG/DL
LYMPHOCYTES # BLD AUTO: 3.5 K/UL (ref 1–4.8)
LYMPHOCYTES NFR BLD: 43.9 % (ref 22–41)
MCH RBC QN AUTO: 28.7 PG (ref 27–33)
MCHC RBC AUTO-ENTMCNC: 33.4 G/DL (ref 33.7–35.3)
MCV RBC AUTO: 85.8 FL (ref 81.4–97.8)
MONOCYTES # BLD AUTO: 0.75 K/UL (ref 0–0.85)
MONOCYTES NFR BLD AUTO: 9.4 % (ref 0–13.4)
NEUTROPHILS # BLD AUTO: 3.37 K/UL (ref 1.82–7.42)
NEUTROPHILS NFR BLD: 42.2 % (ref 44–72)
NRBC # BLD AUTO: 0 K/UL
NRBC BLD-RTO: 0 /100 WBC
PLATELET # BLD AUTO: 258 K/UL (ref 164–446)
PMV BLD AUTO: 9.1 FL (ref 9–12.9)
POTASSIUM SERPL-SCNC: 3.7 MMOL/L (ref 3.6–5.5)
PROT SERPL-MCNC: 6.1 G/DL (ref 6–8.2)
RBC # BLD AUTO: 5.06 M/UL (ref 4.7–6.1)
SODIUM SERPL-SCNC: 141 MMOL/L (ref 135–145)
TRIGL SERPL-MCNC: 148 MG/DL (ref 0–149)
WBC # BLD AUTO: 8 K/UL (ref 4.8–10.8)

## 2018-01-25 PROCEDURE — 36415 COLL VENOUS BLD VENIPUNCTURE: CPT

## 2018-01-25 PROCEDURE — 96375 TX/PRO/DX INJ NEW DRUG ADDON: CPT

## 2018-01-25 PROCEDURE — 96376 TX/PRO/DX INJ SAME DRUG ADON: CPT

## 2018-01-25 PROCEDURE — 700102 HCHG RX REV CODE 250 W/ 637 OVERRIDE(OP): Performed by: HOSPITALIST

## 2018-01-25 PROCEDURE — A9579 GAD-BASE MR CONTRAST NOS,1ML: HCPCS | Performed by: PSYCHIATRY & NEUROLOGY

## 2018-01-25 PROCEDURE — 82962 GLUCOSE BLOOD TEST: CPT

## 2018-01-25 PROCEDURE — 700111 HCHG RX REV CODE 636 W/ 250 OVERRIDE (IP): Performed by: FAMILY MEDICINE

## 2018-01-25 PROCEDURE — 85025 COMPLETE CBC W/AUTO DIFF WBC: CPT

## 2018-01-25 PROCEDURE — 96365 THER/PROPH/DIAG IV INF INIT: CPT

## 2018-01-25 PROCEDURE — 80061 LIPID PANEL: CPT

## 2018-01-25 PROCEDURE — 700111 HCHG RX REV CODE 636 W/ 250 OVERRIDE (IP): Performed by: HOSPITALIST

## 2018-01-25 PROCEDURE — 700117 HCHG RX CONTRAST REV CODE 255: Performed by: PSYCHIATRY & NEUROLOGY

## 2018-01-25 PROCEDURE — 700111 HCHG RX REV CODE 636 W/ 250 OVERRIDE (IP): Performed by: PSYCHIATRY & NEUROLOGY

## 2018-01-25 PROCEDURE — 700105 HCHG RX REV CODE 258: Performed by: PSYCHIATRY & NEUROLOGY

## 2018-01-25 PROCEDURE — 70553 MRI BRAIN STEM W/O & W/DYE: CPT

## 2018-01-25 PROCEDURE — A9270 NON-COVERED ITEM OR SERVICE: HCPCS | Performed by: HOSPITALIST

## 2018-01-25 PROCEDURE — 99225 PR SUBSEQUENT OBSERVATION CARE,LEVEL II: CPT | Performed by: FAMILY MEDICINE

## 2018-01-25 PROCEDURE — 700105 HCHG RX REV CODE 258: Performed by: HOSPITALIST

## 2018-01-25 PROCEDURE — G0378 HOSPITAL OBSERVATION PER HR: HCPCS

## 2018-01-25 PROCEDURE — 80053 COMPREHEN METABOLIC PANEL: CPT

## 2018-01-25 PROCEDURE — 70544 MR ANGIOGRAPHY HEAD W/O DYE: CPT

## 2018-01-25 RX ORDER — SUMATRIPTAN 6 MG/.5ML
6 INJECTION, SOLUTION SUBCUTANEOUS ONCE
Status: COMPLETED | OUTPATIENT
Start: 2018-01-25 | End: 2018-01-25

## 2018-01-25 RX ADMIN — ENOXAPARIN SODIUM 40 MG: 100 INJECTION SUBCUTANEOUS at 08:18

## 2018-01-25 RX ADMIN — GABAPENTIN 1200 MG: 400 CAPSULE ORAL at 12:30

## 2018-01-25 RX ADMIN — ONDANSETRON 4 MG: 2 INJECTION INTRAMUSCULAR; INTRAVENOUS at 18:11

## 2018-01-25 RX ADMIN — OXYCODONE HYDROCHLORIDE 10 MG: 10 TABLET ORAL at 02:20

## 2018-01-25 RX ADMIN — SODIUM CHLORIDE: 9 INJECTION, SOLUTION INTRAVENOUS at 15:50

## 2018-01-25 RX ADMIN — PROCHLORPERAZINE EDISYLATE 10 MG: 5 INJECTION INTRAMUSCULAR; INTRAVENOUS at 11:39

## 2018-01-25 RX ADMIN — INSULIN HUMAN 3 UNITS: 100 INJECTION, SOLUTION PARENTERAL at 17:19

## 2018-01-25 RX ADMIN — LISINOPRIL 5 MG: 5 TABLET ORAL at 12:30

## 2018-01-25 RX ADMIN — SUMATRIPTAN 6 MG: 6 INJECTION, SOLUTION SUBCUTANEOUS at 12:29

## 2018-01-25 RX ADMIN — HYDROMORPHONE HYDROCHLORIDE 0.5 MG: 2 INJECTION INTRAMUSCULAR; INTRAVENOUS; SUBCUTANEOUS at 08:12

## 2018-01-25 RX ADMIN — INSULIN HUMAN 4 UNITS: 100 INJECTION, SOLUTION PARENTERAL at 11:38

## 2018-01-25 RX ADMIN — HYDROMORPHONE HYDROCHLORIDE 0.5 MG: 2 INJECTION INTRAMUSCULAR; INTRAVENOUS; SUBCUTANEOUS at 03:33

## 2018-01-25 RX ADMIN — GADODIAMIDE 20 ML: 287 INJECTION INTRAVENOUS at 08:36

## 2018-01-25 RX ADMIN — ONDANSETRON 4 MG: 2 INJECTION INTRAMUSCULAR; INTRAVENOUS at 08:13

## 2018-01-25 RX ADMIN — INSULIN HUMAN 7 UNITS: 100 INJECTION, SOLUTION PARENTERAL at 06:06

## 2018-01-25 RX ADMIN — ONDANSETRON 4 MG: 2 INJECTION INTRAMUSCULAR; INTRAVENOUS at 03:06

## 2018-01-25 RX ADMIN — GABAPENTIN 1200 MG: 400 CAPSULE ORAL at 20:47

## 2018-01-25 RX ADMIN — OXYCODONE HYDROCHLORIDE 10 MG: 10 TABLET ORAL at 06:06

## 2018-01-25 RX ADMIN — VALPROATE SODIUM 1000 MG: 100 INJECTION, SOLUTION INTRAVENOUS at 15:50

## 2018-01-25 RX ADMIN — GABAPENTIN 1200 MG: 400 CAPSULE ORAL at 06:05

## 2018-01-25 ASSESSMENT — ENCOUNTER SYMPTOMS
EYE PAIN: 0
DOUBLE VISION: 0
NAUSEA: 1
TINGLING: 0
NECK PAIN: 0
FOCAL WEAKNESS: 0
DIZZINESS: 0
BLURRED VISION: 0
SENSORY CHANGE: 0
WEAKNESS: 1
SPEECH CHANGE: 0
EYE REDNESS: 0
BACK PAIN: 0
HEADACHES: 1
PHOTOPHOBIA: 1
HEARTBURN: 0
EYE DISCHARGE: 0
TREMORS: 0
VOMITING: 1
WHEEZING: 0
COUGH: 0
ABDOMINAL PAIN: 0
CHILLS: 0
FEVER: 0
SORE THROAT: 0
SHORTNESS OF BREATH: 0
SEIZURES: 1
DIARRHEA: 0

## 2018-01-25 ASSESSMENT — PAIN SCALES - GENERAL
PAINLEVEL_OUTOF10: 5
PAINLEVEL_OUTOF10: 0
PAINLEVEL_OUTOF10: 9

## 2018-01-25 NOTE — PROGRESS NOTES
Pt is a/o x 4, calm, cooperative, compliant with plan of care. Pt has been nauseated this morning with 3 episodes of vomiting, but is now tolerating his lunch and moderate fluid intake. Pt ambulates independently without any assistance. Pt informed to continue plan of care as ordered, all questions answered, call light within reach.

## 2018-01-25 NOTE — CONSULTS
NEUROLOGY CONSULT         CHIEF COMPLAINT: Headache and Seizure.     Date of Service:  01/24/18    REQUESTING PHYSICIAN: .Zachariah Deutsch M.D.    HISTORY OF PRESENT ILLNESS:  Ivan Prescott is a 41 yo male with history of diabetes, diabetic neuropathy and chronic headaches who presented to the ED with headache x 3 weeks and new onset seizures.  Pt states that he does get frequent headaches and has a family history of migraines.  Three weeks ago, during sex with his wife,he had a sudden onset of a severe right sided headache. Since then he's had severe headaches that have spread from his right temporal region to the left side like a band across his forehead which is throbbing in nature. The headache has been a persistent daily headache which waxes and wanes in severity.  Headaches are associated with light sensitiy and neck pain so that the right side of his neck is extremely sensitive. Today after he work-up he had a severe headache with nausea.  He then went to work and had a headache a fell with seizure-like activity.  EMS was called.  His FSG in the field was 359.  He had another seizure in the ED witnessed by his mother and staff with tongue biting but no urinary incontinence.  He is now back to baseline but still c/o of a headache. There is no family history of seizures. Pt has no history of head trauma.        ROS:  Positive review of systems are in BOLD  Constitutional:  fevers, chills, night sweats, weight loss  HEENT:  headache, blurred vision, loss of vision, double vision, congestion, sore throat, rhinorrhea, dyssphagia, edema   Chest:  SOB, cough, wheezing,  Heart: chest pain, palpitation, orthopnea,  Abd:  abdominal pain, nausea, vomiting, diarrrhea, constipation,   Genitourinary: no hematuria, urinary frequency or urgency, bladder or bowel incontinence  Extremities:  joint pain, muscle pain, back pain, neck pain,   Hematological:  epistaxis, easy bruising, petechia  Neurological: please  see history of present illness  Psychiatric: anxiety, depression  Extremities:  swelling, joint pain,   Endocrine:  polyuria, polydipsea, heat or cold intolerance, weight gain, weight loss  Skin:  lesions, rashes.       PAST MEDICAL HISTORY:  Diabetes  Chronic headaches  Diabetic neuropathy  HTN    ALLERGIES:  No Known Allergies    MEDICATIONS:  Neurontin 1200 mg p.o. t.i.d. for diabetic neuropathy.  Metformin 1000 mg p.o. b.i.d.       FAMILY HISTORY:  Mother-DM and migraines.      SOCIAL HISTORY:  He is   No tobacco, occasional ethoh    EXAM:  Vitals:    01/24/18 1500 01/24/18 1530 01/24/18 1600 01/24/18 1630   BP:       Pulse: 85 90 85 88   Resp: 16 18 16 16   Temp:       SpO2: 98% 99% 96% 96%   Weight:       Height:         General: pt is lying in bed, NAD  HEENT: normocephalic, atraumatic, normal moist oral mucosa  EYes: anicteric, PERRLA, pupils midline  Neck: supple, no carotid bruits  Chest: CTA b/l no wheezing or rales  Heart: regular rate and rhythm, no murmurs  Abd: soft, non-tender, non distended  Extremtiies: no edema  Skin: no rashes or lesions  Neuro  General: pt is alert, oriented x 3, there is no aphasia, no dyarthria, no gross evidence of neglect, he answers questions appropriately  CN: visual fields are full to confrotation, EOMI, PERRLA, normal fundoscopic exam with no papilledma, facial sensation is intact b/l, there is no facial asymmetry, symmetrical palatal elevation, tongue is midline, sternocleidomastoid and trapezius strength are intact  Motor: normal tone. There is no drift in all 4 extremities.  Strength is 5/5 throughout in all 4 extremities  DTR: 2+ throughout except reduced patellar refexes.   Plantar reflexes: downgoing b/l   Coordination: finger to nose and heel shin show no signs of ataxia b/l  Sensation: intact to soft touch in all 4 extremities    LABORATORY TESTING  Lab Results   Component Value Date/Time    WBC 6.6 01/24/2018 08:30 AM    RBC 5.27 01/24/2018 08:30 AM     HEMOGLOBIN 15.7 01/24/2018 08:30 AM    HEMATOCRIT 44.3 01/24/2018 08:30 AM    MCV 84.1 01/24/2018 08:30 AM    MCH 29.8 01/24/2018 08:30 AM    MCHC 35.4 (H) 01/24/2018 08:30 AM    MPV 9.1 01/24/2018 08:30 AM    NEUTSPOLYS 55.50 01/24/2018 08:30 AM    LYMPHOCYTES 30.90 01/24/2018 08:30 AM    MONOCYTES 10.30 01/24/2018 08:30 AM    EOSINOPHILS 2.70 01/24/2018 08:30 AM    BASOPHILS 0.30 01/24/2018 08:30 AM        IMAGING:  CT of the brain was unremarkable.     IMPRESSION AND PLAN: Ivan Prescott is a 43 yo male with history of diabetes, diabetic neuropathy and chronic headaches who presented to the ED with headache x 3 weeks and new onset seizures.  Differential is broad at this point, and so an MRI of the brain with and without contrast as well as an MRA of the brain has been ordered.  If this is negative, he may need a lumbar puncture. He is also getting a baseline EEG. To treat both his migraines and seizures, I have ordered Depakene  1 g IV x 1 followed by Depakote  mg PO BID.     Recommend:  1) EEG  2) MRI brain with and without contrast and MRA head  3) Depakote 1 g IV loading dose folllowed by 500 mg PO BID    Ree Leach M.D.    Neurology

## 2018-01-25 NOTE — ED NOTES
Pt medicated per MAR.  Pt and pt wife updated on bed assignment.  No other needs voiced at this time.  Call light in reach.

## 2018-01-25 NOTE — EEG PROGRESS NOTE
VIDEO ELECTROENCEPHALOGRAM REPORT      Referring MD: Dr. Deutsch.     DOS:  1/24/2018 (total recording of 36 minutes).     INDICATION:  Ivan Prescott 42 y.o. male presenting with seizure like activity.     CURRENT ANTIEPILEPTIC REGIMEN: Lorazepam, Gabapentin.     TECHNIQUE: 30 channel video electroencephalogram (EEG) was performed in accordance with the international 10-20 system. The study was reviewed in bipolar and referential montages. The recording examined the patient during wakeful and drowsy state(s).     DESCRIPTION OF THE RECORD:  During the wakefulness, the background showed a symmetrical 16-20 Hz diffuse beta activity. There was loss of the normal anterior-posterior gradient. During drowsiness, theta/delta frequencies were seen.    ACTIVATION PROCEDURES:   Hyperventilation was performed by the patient for a total of 3 minutes. The technician performing the test noted good effort. But this failed to produce any significant background changes.     Intermittent Photic stimulation was performed in a stepwise fashion from 1 to 30 Hz and elicited a normal response (photic driving), most noticeable in the posterior leads. During and in between photic stimulation, patient started to complain of headache, appeared stiff, making fists with his hands, then had jerking movements of his trunk and arms, stated he feltlike vomiting, was having dry heaves. There were no EEG changes rather than muscle and movement artifact. These events were not epileptic in nature.       ICTAL AND/OR INTERICTAL FINDINGS:   No focal or generalized epileptiform activity noted. No regional slowing was seen during this routine study.  No clinical events or seizures were reported or recorded during the study.     EKG: sampling of the EKG recording demonstrated sinus rhythm.       INTERPRETATION:  This is an abnormal video EEG recording in the awake and drowsy state(s), due to excessive fast background activity due to the use of  sedatives. There were events captured during and in between photic stimulation, which were NOT epileptic in nature. No seizures captured. Clinical correlation is recommended.    Note:This EEG does not rule out epilepsy.  If the clinical suspicion remains high for seizures, a prolonged recording to capture clinical or subclinical events may be helpful.        Roderick Lau MD  Medical Director, Epilepsy and Neurodiagnostics.   Clinical  of Neurology [facility].   Diplomate in Neurology, Epilepsy, and Electrodiagnostic Medicine.   Office: [phone]  Fax: [phone]

## 2018-01-25 NOTE — DIETARY
Nutrition Services: Consult for Poor PO Intake     41 yo male diagnosed with seizures, migraines, and known DM.  I spoke with pt at bedside regarding PO intake and wt history.  Pt reports his PO intake has been poor the last month due to stress and intense migraines. He would eat once a day or every other day - pt is aware this is not idea for managing his DM.  He reports that his UBW is 195 lbs and wt upon current admit is 190 lbs.  This is a 3% loss in the last month - which is notable.  Pt states he has nausea with emesis since his MRI this morning.  No complaints of diarrhea or constipation.  He states he is forcing himself to eat 50% of his meal trays.  Pt wanted to discuss DM management since he feels he did not get DM education after being diagnosed in April 2017 - I was able to educate the pt and answer his questions.  Reviewed snack preferences with pt and reiterated the importance of adequate nutrition.    Past Medical History:   Diagnosis Date   • Allergy    • Anxiety    • Diabetes (CMS-[facility])    • Hypertension    • Migraines      Ht: 6'  Wt: 86 kg via stated wt on 1/24  BMI: 24.73 - WNL  Pertinent Labs: 1/24: Glucose: 303; 1/18: A1C: 10.3  Pertinent Meds: SSI humulin, prinivil, senokot, zofran prn   Fluids: NS infusion 100 mL/hr  Skin: No stage 2 or greater pressure ulcers documented  Procedures: EEG on 1/24 - pending results   Gi: Last BM 1/23  Diet: Diabetic     PLAN/RECOMMEND -   1.) Nutrition rep to see daily for meal and snack preferences  2.) Encourage PO  3.) Weekly weights to monitor fluid and nutrition status  4.) Pt may benefit from a visit from the DM educator     RD following

## 2018-01-25 NOTE — CARE PLAN
Problem: Infection  Goal: Will remain free from infection    Intervention: Implement standard precautions and perform hand washing before and after patient contact  Instruct pt/family on standard precautions to help prevent spread of infection/germs.        Problem: Pain Management  Goal: Pain level will decrease to patient's comfort goal    Intervention: Follow pain managment plan developed in collaboration with patient and Interdisciplinary Team  Instruct pt to notify staff when pain levels are rising or not resolving so that treatment may be given.

## 2018-01-25 NOTE — PROGRESS NOTES
[facility] Hospitalist Progress Note    Date of Service: 2018    Chief Complaint  42 y.o. male admitted 2018 with Headache, Nausea and vomiting.    Interval Problem Update  HA - chronic, but worse x 3 weeks  N/V - persistent  HTN - controlled  DM - controlled    Consultants/Specialty  Neuro - [facility]    Disposition  TBD        Review of Systems   Constitutional: Positive for malaise/fatigue. Negative for chills and fever.   HENT: Negative for hearing loss and sore throat.    Eyes: Positive for photophobia. Negative for blurred vision, double vision, pain, discharge and redness.   Respiratory: Negative for cough, shortness of breath and wheezing.    Cardiovascular: Negative for chest pain and leg swelling.   Gastrointestinal: Positive for nausea and vomiting. Negative for abdominal pain, diarrhea and heartburn.   Genitourinary: Negative for dysuria.   Musculoskeletal: Negative for back pain and neck pain.   Neurological: Positive for seizures, weakness and headaches. Negative for dizziness, tingling, tremors, sensory change, speech change and focal weakness.      Physical Exam  Laboratory/Imaging   Hemodynamics  Temp (24hrs), Av.7 °C (98.1 °F), Min:36.3 °C (97.3 °F), Max:37.1 °C (98.7 °F)   Temperature: 36.3 °C (97.3 °F)  Pulse  Av  Min: 82  Max: 104 Heart Rate (Monitored): 88  Blood Pressure: 115/79, NIBP: 109/78      Respiratory      Respiration: 19, Pulse Oximetry: 99 %             Fluids  No intake or output data in the 24 hours ending 18 1202    Nutrition  Orders Placed This Encounter   Procedures   • Diet Order     Standing Status:   Standing     Number of Occurrences:   1     Order Specific Question:   Diet:     Answer:   Diabetic [3]     Physical Exam   Constitutional: He is oriented to person, place, and time. He appears well-developed and well-nourished.   HENT:   Head: Normocephalic and atraumatic.   Eyes: Conjunctivae and EOM are normal. Pupils are equal, round, and reactive to light.    Neck: No tracheal deviation present. No thyromegaly present.   Cardiovascular: Normal rate and regular rhythm.    Pulmonary/Chest: Effort normal and breath sounds normal.   Abdominal: Soft. Bowel sounds are normal. He exhibits no distension. There is no tenderness.   Lymphadenopathy:     He has no cervical adenopathy.   Neurological: He is alert and oriented to person, place, and time. No cranial nerve deficit.   MMT 5/5   Skin: Skin is warm and dry.   Nursing note and vitals reviewed.      Recent Labs      01/24/18   0830  01/25/18   0330   WBC  6.6  8.0   RBC  5.27  5.06   HEMOGLOBIN  15.7  14.5   HEMATOCRIT  44.3  43.4   MCV  84.1  85.8   MCH  29.8  28.7   MCHC  35.4*  33.4*   RDW  37.1  38.2   PLATELETCT  257  258   MPV  9.1  9.1     Recent Labs      01/24/18   0830  01/25/18   0330   SODIUM  136  141   POTASSIUM  4.0  3.7   CHLORIDE  105  106   CO2  21  26   GLUCOSE  303*  143*   BUN  16  16   CREATININE  0.93  1.04   CALCIUM  9.3  8.8     Recent Labs      01/24/18   0830   APTT  26.6   INR  0.94         Recent Labs      01/25/18   0330   TRIGLYCERIDE  148   HDL  31*   LDL  105*          Assessment/Plan     Nausea and vomiting- (present on admission)   Assessment & Plan    IVF hydration        Headache- (present on admission)   Assessment & Plan    Trial of Imitrex        DM (diabetes mellitus) (CMS-[facility])- (present on admission)   Assessment & Plan    SSI        Essential hypertension- (present on admission)   Assessment & Plan    Lisinopril          Quality-Core Measures

## 2018-01-25 NOTE — ED NOTES
Transport at bedside for transfer to [facility].  Floor informed of pt transfer.  All belongings sent with pt.

## 2018-01-25 NOTE — DISCHARGE PLANNING
Medical Social Worker    Referral: IDT Rounds     Intervention: Pt discussed in rounds. Per attending, pt has no SW needs at this time.    Plan: DC Home upon medical clearance.

## 2018-01-25 NOTE — PROGRESS NOTES
Pt. alert and oriented x 4 and complaining of a migraine. Pt. medicated with PRN oxycodone 10 mg. MRI screening sheet faxed. Fall/safety education provided. All questions/concerns addressed at this time.

## 2018-01-25 NOTE — PROCEDURES
VIDEO ELECTROENCEPHALOGRAM REPORT        Referring MD: Dr. Deutsch.      DOS:  1/24/2018 (total recording of 36 minutes).      INDICATION:  Ivan Prescott 42 y.o. male presenting with seizure like activity.      CURRENT ANTIEPILEPTIC REGIMEN: Lorazepam, Gabapentin.      TECHNIQUE: 30 channel video electroencephalogram (EEG) was performed in accordance with the international 10-20 system. The study was reviewed in bipolar and referential montages. The recording examined the patient during wakeful and drowsy state(s).      DESCRIPTION OF THE RECORD:  During the wakefulness, the background showed a symmetrical 16-20 Hz diffuse beta activity. There was loss of the normal anterior-posterior gradient. During drowsiness, theta/delta frequencies were seen.     ACTIVATION PROCEDURES:   Hyperventilation was performed by the patient for a total of 3 minutes. The technician performing the test noted good effort. But this failed to produce any significant background changes.      Intermittent Photic stimulation was performed in a stepwise fashion from 1 to 30 Hz and elicited a normal response (photic driving), most noticeable in the posterior leads. During and in between photic stimulation, patient started to complain of headache, appeared stiff, making fists with his hands, then had jerking movements of his trunk and arms, stated he feltlike vomiting, was having dry heaves. There were no EEG changes rather than muscle and movement artifact. These events were not epileptic in nature.         ICTAL AND/OR INTERICTAL FINDINGS:   No focal or generalized epileptiform activity noted. No regional slowing was seen during this routine study.  No clinical events or seizures were reported or recorded during the study.      EKG: sampling of the EKG recording demonstrated sinus rhythm.         INTERPRETATION:  This is an abnormal video EEG recording in the awake and drowsy state(s), due to excessive fast background activity due to the  use of sedatives. There were events captured during and in between photic stimulation, which were NOT epileptic in nature. No seizures captured. Clinical correlation is recommended.     Note:This EEG does not rule out epilepsy.  If the clinical suspicion remains high for seizures, a prolonged recording to capture clinical or subclinical events may be helpful.           Roderick Lau MD  Medical Director, Epilepsy and Neurodiagnostics.   Clinical  of Neurology [facility].   Diplomate in Neurology, Epilepsy, and Electrodiagnostic Medicine.   Office: [phone]  Fax: [phone]       DENNIS / JAMEY    DD:  01/24/2018 16:23:11  DT:  01/24/2018 16:26:41    D#:  [id number]  Job#:  [id number]

## 2018-01-26 VITALS
DIASTOLIC BLOOD PRESSURE: 82 MMHG | OXYGEN SATURATION: 98 % | WEIGHT: 190 LBS | HEART RATE: 66 BPM | RESPIRATION RATE: 16 BRPM | HEIGHT: 74 IN | TEMPERATURE: 97.8 F | SYSTOLIC BLOOD PRESSURE: 131 MMHG | BODY MASS INDEX: 24.38 KG/M2

## 2018-01-26 LAB — GLUCOSE BLD-MCNC: 189 MG/DL (ref 65–99)

## 2018-01-26 PROCEDURE — 82962 GLUCOSE BLOOD TEST: CPT

## 2018-01-26 PROCEDURE — 700102 HCHG RX REV CODE 250 W/ 637 OVERRIDE(OP): Performed by: HOSPITALIST

## 2018-01-26 PROCEDURE — A9270 NON-COVERED ITEM OR SERVICE: HCPCS | Performed by: HOSPITALIST

## 2018-01-26 PROCEDURE — 99217 PR OBSERVATION CARE DISCHARGE: CPT | Performed by: FAMILY MEDICINE

## 2018-01-26 PROCEDURE — G0378 HOSPITAL OBSERVATION PER HR: HCPCS

## 2018-01-26 RX ORDER — DIVALPROEX SODIUM 250 MG/1
250 TABLET, DELAYED RELEASE ORAL 2 TIMES DAILY
Qty: 60 TAB | Refills: 1 | Status: SHIPPED | OUTPATIENT
Start: 2018-01-26 | End: 2018-03-27 | Stop reason: SDUPTHER

## 2018-01-26 RX ORDER — ONDANSETRON 4 MG/1
4 TABLET, ORALLY DISINTEGRATING ORAL EVERY 4 HOURS PRN
Qty: 30 TAB | Refills: 0 | Status: SHIPPED | OUTPATIENT
Start: 2018-01-26 | End: 2018-02-20 | Stop reason: SDUPTHER

## 2018-01-26 RX ORDER — SUMATRIPTAN 25 MG/1
25-100 TABLET, FILM COATED ORAL
Qty: 10 TAB | Refills: 1 | Status: SHIPPED | OUTPATIENT
Start: 2018-01-26 | End: 2018-01-31

## 2018-01-26 RX ADMIN — LISINOPRIL 5 MG: 5 TABLET ORAL at 08:26

## 2018-01-26 RX ADMIN — INSULIN HUMAN 3 UNITS: 100 INJECTION, SOLUTION PARENTERAL at 04:37

## 2018-01-26 RX ADMIN — GABAPENTIN 1200 MG: 400 CAPSULE ORAL at 04:33

## 2018-01-26 ASSESSMENT — PAIN SCALES - GENERAL
PAINLEVEL_OUTOF10: 0

## 2018-01-26 NOTE — CARE PLAN
Problem: Communication  Goal: The ability to communicate needs accurately and effectively will improve  Outcome: PROGRESSING AS EXPECTED  Pt. alert and oriented x 4 and using the call light for all needs/concerns.     Problem: Knowledge Deficit  Goal: Knowledge of disease process/condition, treatment plan, diagnostic tests, and medications will improve  Outcome: PROGRESSING AS EXPECTED  Pt. Educated about the plan of care and medication regimen including PRN medications. Pt. Verbalizes understanding at this time.

## 2018-01-26 NOTE — PROGRESS NOTES
Received report from night RN.   Assumed care of patient.   Pt AA&Ox4.   BONILLA  Denies n/v, n/t, pain.   Medications given per MAR.   Pt up self and steady.   Anxious to go home.   Plan is to discharge today.   Plan of care discussed.   All questions answered at this time.

## 2018-01-26 NOTE — CARE PLAN
Problem: Communication  Goal: The ability to communicate needs accurately and effectively will improve  Pt able to make needs known.     Problem: Safety  Goal: Will remain free from injury  Safety precautions in place.  Bed locked and in low position. Call light within reach.

## 2018-01-26 NOTE — PROGRESS NOTES
NEUROLOGY INPATIENT PROGRESS NOTE      CHIEF COMPLAINT: Headache and seizures.      DOS  Pt was seen and examined on 01/25/18    INTERVAL EVENTS:  EEG done yesterday was normal.  During photic stimulation, MR. Prescott had a seizure-like episode which was not associated with any electrographic seizures c/w non-epileptic seizures.     Pt still c/o severe headache. He did not get the Depakote IV yesterday. He had several episodes of emesis.      MRI of the brain with and without contrast and MRA of the brain were reviewed which normal.     ROS:  No headaches  No fevers of chills    MEDICATIONS    Current Facility-Administered Medications:   •  gabapentin (NEURONTIN) capsule 1,200 mg, 1,200 mg, Oral, TID, Zachariah Deutsch M.D., 1,200 mg at 01/25/18 1230  •  senna-docusate (PERICOLACE or SENOKOT S) 8.6-50 MG per tablet 2 Tab, 2 Tab, Oral, BID **AND** polyethylene glycol/lytes (MIRALAX) PACKET 1 Packet, 1 Packet, Oral, QDAY PRN **AND** magnesium hydroxide (MILK OF MAGNESIA) suspension 30 mL, 30 mL, Oral, QDAY PRN **AND** bisacodyl (DULCOLAX) suppository 10 mg, 10 mg, Rectal, QDAY PRN, Zachariah Deutsch M.D.  •  NS infusion, , Intravenous, Continuous, Zachariah Deutsch M.D., Last Rate: 100 mL/hr at 01/25/18 1550  •  enoxaparin (LOVENOX) inj 40 mg, 40 mg, Subcutaneous, DAILY, Zachariah Deutsch M.D., 40 mg at 01/25/18 0818  •  Notify provider if pain remains uncontrolled, , , CONTINUOUS **AND** Use the numeric rating scale (NRS-11) on regular floors and Critical-Care Pain Observation Tool (CPOT) on ICUs/Trauma to assess pain, , , CONTINUOUS **AND** Pulse Ox (Oximetry), , , CONTINUOUS **AND** Pharmacy Consult Request ...Pain Management Review, , Other, PRN **AND** If patient difficult to arouse and/or has respiratory depression, stop any opiates that are currently infusing and call a Rapid Response., , , CONTINUOUS **AND** oxycodone immediate-release (ROXICODONE) tablet 5 mg, 5 mg, Oral, Q3HRS PRN, 5 mg at 01/24/18  1547 **AND** oxycodone immediate-release (ROXICODONE) tablet 10 mg, 10 mg, Oral, Q3HRS PRN, 10 mg at 01/25/18 0606 **AND** HYDROmorphone (DILAUDID) injection 0.5 mg, 0.5 mg, Intravenous, Q3HRS PRN, Zachariah Deutsch M.D., 0.5 mg at 01/25/18 0812  •  insulin regular (HUMULIN R) injection 3-14 Units, 3-14 Units, Subcutaneous, 4X/DAY ACHS, 4 Units at 01/25/18 1138 **AND** Accu-Chek ACHS, , , Q AC AND BEDTIME(S) **AND** NOTIFY MD and PharmD, , , Once **AND** glucose 4 g chewable tablet 16 g, 16 g, Oral, Q15 MIN PRN **AND** dextrose 50% (D50W) injection 25 mL, 25 mL, Intravenous, Q15 MIN PRN, Zachariah Deutsch M.D.  •  ondansetron (ZOFRAN) syringe/vial injection 4 mg, 4 mg, Intravenous, Q4HRS PRN, Zachariah Deutsch M.D., 4 mg at 01/25/18 0813  •  ondansetron (ZOFRAN ODT) dispertab 4 mg, 4 mg, Oral, Q4HRS PRN, Zachariah Deutsch M.D.  •  promethazine (PHENERGAN) tablet 12.5-25 mg, 12.5-25 mg, Oral, Q4HRS PRN, Zachariah Deutsch M.D.  •  promethazine (PHENERGAN) suppository 12.5-25 mg, 12.5-25 mg, Rectal, Q4HRS PRN, Zachariah Deutsch M.D.  •  prochlorperazine (COMPAZINE) injection 5-10 mg, 5-10 mg, Intravenous, Q4HRS PRN, Zachariah Deutsch M.D., 10 mg at 01/25/18 1139  •  lorazepam (ATIVAN) tablet 0.5 mg, 0.5 mg, Oral, Q6HRS PRN **OR** lorazepam (ATIVAN) injection 0.5 mg, 0.5 mg, Intravenous, Q6HRS PRN, Zachariah Deutsch M.D.  •  lorazepam (ATIVAN) injection 4 mg, 4 mg, Intravenous, Q10 MIN PRN, Zachariah Deutsch M.D.  •  lisinopril (PRINIVIL) 5 MG tablet, 5 mg, Oral, Q DAY, Zachariah Deutsch M.D., 5 mg at 01/25/18 1230      PHYSICAL EXAM  Vitals:    01/24/18 1630 01/24/18 2000 01/25/18 0400 01/25/18 0800   BP:  125/86 117/82 115/79   Pulse: 88 86 82 98   Resp: 16 18 16 19   Temp:  37.1 °C (98.7 °F) 36.8 °C (98.2 °F) 36.3 °C (97.3 °F)   SpO2: 96% 98% 96% 99%   Weight:       Height:         PT is alert, lying in bed, NAD  Neuro: pt is alert, answers questions appropriately. There is no aphasia or  dysarthria  CN: PERRLA, EOMI, no facial asymemtry  Motor: 5/5 throughout in all 4 extremities  Coordination: FNF is intact b/l    Sensation: intact to soft touch in all 4 extremities    LABS:    Lab Results   Component Value Date/Time    WBC 8.0 01/25/2018 03:30 AM    RBC 5.06 01/25/2018 03:30 AM    HEMOGLOBIN 14.5 01/25/2018 03:30 AM    HEMATOCRIT 43.4 01/25/2018 03:30 AM    MCV 85.8 01/25/2018 03:30 AM    MCH 28.7 01/25/2018 03:30 AM    MCHC 33.4 (L) 01/25/2018 03:30 AM    MPV 9.1 01/25/2018 03:30 AM    NEUTSPOLYS 42.20 (L) 01/25/2018 03:30 AM    LYMPHOCYTES 43.90 (H) 01/25/2018 03:30 AM    MONOCYTES 9.40 01/25/2018 03:30 AM    EOSINOPHILS 3.40 01/25/2018 03:30 AM    BASOPHILS 0.80 01/25/2018 03:30 AM        Lab Results   Component Value Date/Time    SODIUM 141 01/25/2018 03:30 AM    POTASSIUM 3.7 01/25/2018 03:30 AM    CHLORIDE 106 01/25/2018 03:30 AM    CO2 26 01/25/2018 03:30 AM    GLUCOSE 143 (H) 01/25/2018 03:30 AM    BUN 16 01/25/2018 03:30 AM    CREATININE 1.04 01/25/2018 03:30 AM        Lab Results   Component Value Date/Time    CHOLSTRLTOT 166 01/25/2018 03:30 AM     (H) 01/25/2018 03:30 AM    HDL 31 (A) 01/25/2018 03:30 AM    TRIGLYCERIDE 148 01/25/2018 03:30 AM           IMPRESSION: Ivan Prescott is a 41 yo male with history of diabetes, diabetic neuropathy and chronic headaches who presented to the ED with headache x 3 weeks and new onset seizures.  MRI and MRA of the brain were unremarkable.  EEG was normal.   During photic stimulation, MR. Prescott had a seizure-like episode which was not associated with any electrographic seizures c/w non-epileptic seizures.     Diagnosis  1) Migraines  2) non-epileptic seizures.     Recommend:  --I have ordered Depakene  1 g IV x 1   --Continue Depakote  mg PO BID.   --agree with trial of Imitex.    Ree Leach M.D.     Neurology

## 2018-01-26 NOTE — PROGRESS NOTES
Pt. Alert and oriented x 4 and complaining of chronic migraine but improved from yesterday. MRI completed this AM. No seizure activity at this time. Plan of care discussed. Call light in reach.

## 2018-01-26 NOTE — DISCHARGE PLANNING
Medical Social Worker    SW provided pt with a Hospital Proof of Admittance letter as requested by pt.

## 2018-01-26 NOTE — DISCHARGE INSTRUCTIONS
Discharge Instructions    Discharged to home by car with relative. Discharged via wheelchair, hospital escort: Yes.  Special equipment needed: Not Applicable    Be sure to schedule a follow-up appointment with your primary care doctor or any specialists as instructed.     Discharge Plan:   Diet Plan: Discussed  Activity Level: Discussed  Confirmed Symptoms Management: Discussed  Influenza Vaccine Indication: Patient Refuses    I understand that a diet low in cholesterol, fat, and sodium is recommended for good health. Unless I have been given specific instructions below for another diet, I accept this instruction as my diet prescription.   Other diet: Regular, Diabetic    Special Instructions: None    · Is patient discharged on Warfarin / Coumadin?   No     Depression / Suicide Risk    As you are discharged from this [facility] facility, it is important to learn how to keep safe from harming yourself.    Recognize the warning signs:  · Abrupt changes in personality, positive or negative- including increase in energy   · Giving away possessions  · Change in eating patterns- significant weight changes-  positive or negative  · Change in sleeping patterns- unable to sleep or sleeping all the time   · Unwillingness or inability to communicate  · Depression  · Unusual sadness, discouragement and loneliness  · Talk of wanting to die  · Neglect of personal appearance   · Rebelliousness- reckless behavior  · Withdrawal from people/activities they love  · Confusion- inability to concentrate     If you or a loved one observes any of these behaviors or has concerns about self-harm, here's what you can do:  · Talk about it- your feelings and reasons for harming yourself  · Remove any means that you might use to hurt yourself (examples: pills, rope, extension cords, firearm)  · Get professional help from the community (Mental Health, Substance Abuse, psychological counseling)  · Do not be alone:Call your Safe Contact- someone  whom you trust who will be there for you.  · Call your local CRISIS HOTLINE 624-3919 or 805-689-3447  · Call your local Children's Mobile Crisis Response Team [place] (121) 249-0709 or www.[employer]  · Call the toll free National Suicide Prevention Hotlines   · National Suicide Prevention Lifeline 512-405-FAEO (3953)  · [employer] Line Network 800-SUICIDE (914-5603)

## 2018-01-27 NOTE — DISCHARGE SUMMARY
Hospital Medicine Discharge Note     Admit Date:  1/24/2018       Discharge Date:   1/26/2018    Attending Physician:  Fernando Murphy     Diagnoses (includes active and resolved):   Active Problems:    Headache POA: Yes    Nausea and vomiting POA: Yes    Essential hypertension POA: Yes    DM (diabetes mellitus) (CMS-[facility]) POA: Yes  Resolved Problems:    * No resolved hospital problems. *       Hospital Summary (Brief Narrative):       42-year-old white male admitted for headache, and nausea and vomiting. CT scan and MRI were both noted to be negative. Trial of IV Imitrex was done as well as IV Depakote for possible migraine headaches. There was remarkable improvement of his symptoms. Neurology was likewise consulted on the case. His headache has resolved as well as the nausea and vomiting and photophobia. There was a question of whether he had seizure during a severe headache. EEG was noted to be negative.         Consultants:      Myron Leach    Procedures:        EEG    Discharge Medications:        Medication Reconciliation Completed     Medication List      START taking these medications      Instructions   divalproex 250 MG Tbec  Commonly known as:  DEPAKOTE   Take 1 Tab by mouth 2 times a day.  Dose:  250 mg     ondansetron 4 MG Tbdp  Commonly known as:  ZOFRAN ODT   Take 1 Tab by mouth every four hours as needed (give PO if no IV route available).  Dose:  4 mg     SUMAtriptan 25 MG Tabs tablet  Commonly known as:  IMITREX   Take 1-4 Tabs by mouth Once PRN for Migraine for up to 1 dose.  Dose:   mg        CONTINUE taking these medications      Instructions   gabapentin 600 MG tablet  Commonly known as:  NEURONTIN   Take 2 Tabs by mouth 3 times a day.  Dose:  1200 mg     lisinopril 5 MG Tabs  Commonly known as:  PRINIVIL   Take 5 mg by mouth every day.  Dose:  5 mg     metformin 1000 MG tablet  Commonly known as:  GLUCOPHAGE   Doctor's comments:  Please Fill Dosage increase  Take  One 1000 mg  tablet bid with meals              Disposition:  Home    Diet:   Regular    Activity:   As tolerated    Code status:  Full    Primary Care Provider:    NHAN Oshea    Follow up appointment details :        No follow-up provider specified.  Future Appointments  Date Time Provider Department Center   2/2/2018 4:30 PM [facility]   2/7/2018 4:20 PM NHAN Oshea [facility]       Pending Studies:        None    Time spent on discharge day patient visit: 30 minutes    #################################################      Most Recent Labs:    Lab Results   Component Value Date/Time    WBC 8.0 01/25/2018 03:30 AM    RBC 5.06 01/25/2018 03:30 AM    HEMOGLOBIN 14.5 01/25/2018 03:30 AM    HEMATOCRIT 43.4 01/25/2018 03:30 AM    MCV 85.8 01/25/2018 03:30 AM    MCH 28.7 01/25/2018 03:30 AM    MCHC 33.4 (L) 01/25/2018 03:30 AM    MPV 9.1 01/25/2018 03:30 AM    NEUTSPOLYS 42.20 (L) 01/25/2018 03:30 AM    LYMPHOCYTES 43.90 (H) 01/25/2018 03:30 AM    MONOCYTES 9.40 01/25/2018 03:30 AM    EOSINOPHILS 3.40 01/25/2018 03:30 AM    BASOPHILS 0.80 01/25/2018 03:30 AM      Lab Results   Component Value Date/Time    SODIUM 141 01/25/2018 03:30 AM    POTASSIUM 3.7 01/25/2018 03:30 AM    CHLORIDE 106 01/25/2018 03:30 AM    CO2 26 01/25/2018 03:30 AM    GLUCOSE 143 (H) 01/25/2018 03:30 AM    BUN 16 01/25/2018 03:30 AM    CREATININE 1.04 01/25/2018 03:30 AM      Lab Results   Component Value Date/Time    ALTSGPT 33 01/25/2018 03:30 AM    ASTSGOT 18 01/25/2018 03:30 AM    ALKPHOSPHAT 80 01/25/2018 03:30 AM    TBILIRUBIN 0.5 01/25/2018 03:30 AM    ALBUMIN 3.4 01/25/2018 03:30 AM    GLOBULIN 2.7 01/25/2018 03:30 AM    INR 0.94 01/24/2018 08:30 AM     Lab Results   Component Value Date/Time    PROTHROMBTM 12.3 01/24/2018 08:30 AM    INR 0.94 01/24/2018 08:30 AM        Imaging/ Testing:      MR-BRAIN-WITH & W/O   Final Result      1. MRI OF THE BRAIN WITHOUT AND WITH CONTRAST WITHIN NORMAL LIMITS.      2. NO  EVIDENCE OF MASS LESION, HETEROTOPIC GRAY MATTER, GROSS CORTICAL DYSPLASIA, OR MESIAL TEMPORAL SCLEROSIS.      3. INCIDENTALLY NOTED ORIF RIGHT ORBITAL FLOOR BLOWOUT FRACTURE WITH MESH REPAIR.      MR-MRA HEAD-W/O   Final Result      1.  Normal variant slightly dominant distal left vertebral artery.   2.  Normal variant carotid origin of the right posterior cerebral artery.   3.  Normal variant slightly dominant left anterior cerebral artery A1 segment with a smaller right A1 segment.   4.  No evidence of aneurysm or occlusive lesion about [place].      DX-CHEST-PORTABLE (1 VIEW)   Final Result      Hypoinflation without other evidence for acute cardiopulmonary disease.      CT-HEAD W/O   Final Result      No acute intracranial abnormality.          Instructions:      The patient was instructed to return to the ER in the event of worsening symptoms. I have counseled the patient on the importance of compliance and the patient has agreed to proceed with all medical recommendations and follow up plan indicated above.   The patient understands that all medications come with benefits and risks. Risks may include permanent injury or death and these risks can be minimized with close reassessment and monitoring.

## 2018-01-29 ENCOUNTER — PATIENT MESSAGE (OUTPATIENT)
Dept: MEDICAL GROUP | Facility: PHYSICIAN GROUP | Age: 43
End: 2018-01-29

## 2018-01-29 NOTE — TELEPHONE ENCOUNTER
Requested Prescriptions     Signed Prescriptions Disp Refills   • metformin (GLUCOPHAGE) 1000 MG tablet 180 Tab 3     Sig: TAKE ONE TABLET (1000 MG) BY MOUTH TWICE DAILY WITH MEALS     Authorizing Provider: EFRAIN MILLER A.P.R.N.

## 2018-01-31 ENCOUNTER — OFFICE VISIT (OUTPATIENT)
Dept: MEDICAL GROUP | Facility: PHYSICIAN GROUP | Age: 43
End: 2018-01-31
Payer: COMMERCIAL

## 2018-01-31 VITALS
HEIGHT: 73 IN | SYSTOLIC BLOOD PRESSURE: 108 MMHG | HEART RATE: 115 BPM | OXYGEN SATURATION: 97 % | BODY MASS INDEX: 24.39 KG/M2 | RESPIRATION RATE: 14 BRPM | DIASTOLIC BLOOD PRESSURE: 86 MMHG | TEMPERATURE: 98.2 F | WEIGHT: 184 LBS

## 2018-01-31 DIAGNOSIS — G89.29 CHRONIC INTRACTABLE HEADACHE, UNSPECIFIED HEADACHE TYPE: ICD-10-CM

## 2018-01-31 DIAGNOSIS — R51.9 CHRONIC INTRACTABLE HEADACHE, UNSPECIFIED HEADACHE TYPE: ICD-10-CM

## 2018-01-31 PROCEDURE — 99214 OFFICE O/P EST MOD 30 MIN: CPT | Performed by: NURSE PRACTITIONER

## 2018-01-31 NOTE — LETTER
January 31, 2018         Patient: Ivan Prescott   YOB: 1975   Date of Visit: 1/31/2018           To Whom it May Concern:    Ivan Prescott was seen in my clinic on 1/31/2018. He may return to work on 2/7/18.    If you have any questions or concerns, please don't hesitate to call.        Sincerely,           NHAN Oshea  Electronically Signed

## 2018-01-31 NOTE — ASSESSMENT & PLAN NOTE
Continues with headaches that happen with light.  Headaches only went away with the immitrex IV.  Taking immitrex nightly.  Rates pain is 2-10.    Felt good this morning and then had immediate vomitting/nausea with eye exam.   Was hospitalized last week.  MRI, MRA brain normal.  CT WNL.  EEG, no seizure activity noted.  Continues with ongoing headaches.  Sensitive to light, nausea and vomitting.  Reports eating six small meals daily and good fluid intake.

## 2018-02-01 ENCOUNTER — PATIENT MESSAGE (OUTPATIENT)
Dept: MEDICAL GROUP | Facility: PHYSICIAN GROUP | Age: 43
End: 2018-02-01

## 2018-02-01 NOTE — PROGRESS NOTES
"Chief Complaint   Patient presents with   • Hospital Follow-up       Subjective:   Ivan Prescott is a 42 y.o. male here today for hospital follow up around his headaches    Headache  Continues with headaches that happen with light.  Headaches only went away with the immitrex IV.  Taking immitrex nightly.  Rates pain is 2-10.    Felt good this morning and then had immediate vomitting/nausea with eye exam.   Was hospitalized last week.  MRI, MRA brain normal.  CT WNL.  EEG, no seizure activity noted.  Continues with ongoing headaches.  Sensitive to light, nausea and vomitting.  Reports eating six small meals daily and good fluid intake.         Current medicines (including changes today)  Current Outpatient Prescriptions   Medication Sig Dispense Refill   • SUMAtriptan Succinate 4 MG/0.5ML Solution Cartridge Inject 1 Applicator as instructed 1 time daily as needed for up to 6 doses. 3 mL 3   • metformin (GLUCOPHAGE) 1000 MG tablet TAKE ONE TABLET (1000 MG) BY MOUTH TWICE DAILY WITH MEALS 180 Tab 3   • divalproex (DEPAKOTE) 250 MG Tablet Delayed Response Take 1 Tab by mouth 2 times a day. 60 Tab 1   • gabapentin (NEURONTIN) 600 MG tablet Take 2 Tabs by mouth 3 times a day. 180 Tab 2   • ondansetron (ZOFRAN ODT) 4 MG TABLET DISPERSIBLE Take 1 Tab by mouth every four hours as needed (give PO if no IV route available). 30 Tab 0   • lisinopril (PRINIVIL) 5 MG Tab Take 5 mg by mouth every day.       No current facility-administered medications for this visit.      He  has a past medical history of Allergy; Anxiety; Diabetes (CMS-HCC); Head ache; Hypertension; and Migraines.    ROS as stated in hpi  No chest pain, no shortness of breath, no abdominal pain       Objective:     Blood pressure 108/86, pulse (!) 115, temperature 36.8 °C (98.2 °F), resp. rate 14, height 1.854 m (6' 1\"), weight 83.5 kg (184 lb), SpO2 97 %. Body mass index is 24.28 kg/m².   Physical Exam:  Constitutional: Alert, no distress.  Skin: Warm, dry, " good turgor,no cyanosis, no rashes in visible areas.  Eye: Equal, round and reactive, conjunctiva clear, lids normal. Sensitive to light  Ears: No tenderness, no discharge.  External canals are without any significant edema or erythema.  .  Gross auditory acuity is intact.  Nose: symmetrical without tenderness, no discharge.  Mouth/Throat: lips without lesion.  Oropharynx clear.    Neck: Trachea midline, no masses, no obvious thyroid enlargement.. . Range of motion within normal limits.  Neuro: Cranial nerves 2-12 grossly intact.  No sensory deficit.  Respiratory: Unlabored respiratory effort, lungs clear to auscultation, no wheezes, no ronchi.  Cardiovascular: Normal S1, S2, no murmur, no edema.    Psych: Alert and oriented x3, normal affect and mood and judgement.        Assessment and Plan:   The following treatment plan was discussed    1. Chronic intractable headache, unspecified headache type  Chronic. Ongoing. Unstable.  Urgent referral placed in hospital with patient scheduled to see neurology.  Sub Q immitrex ordered today.  Patient to continue with good diet and fluids.  Keeping diabetes under control.  Follow up in 4 weeks.  ED precautions reviewed regarding slurred speech, stroke symptoms.       Followup: Return in about 4 weeks (around 2/28/2018) for headaches, Diabetes.

## 2018-02-04 ENCOUNTER — PATIENT MESSAGE (OUTPATIENT)
Dept: MEDICAL GROUP | Facility: PHYSICIAN GROUP | Age: 43
End: 2018-02-04

## 2018-02-05 ENCOUNTER — PATIENT MESSAGE (OUTPATIENT)
Dept: MEDICAL GROUP | Facility: PHYSICIAN GROUP | Age: 43
End: 2018-02-05

## 2018-02-06 ENCOUNTER — PATIENT MESSAGE (OUTPATIENT)
Dept: MEDICAL GROUP | Facility: PHYSICIAN GROUP | Age: 43
End: 2018-02-06

## 2018-02-06 RX ORDER — TOPIRAMATE 25 MG/1
25 TABLET ORAL 2 TIMES DAILY
Qty: 90 TAB | Refills: 1 | Status: SHIPPED | OUTPATIENT
Start: 2018-02-06 | End: 2018-02-12 | Stop reason: SDUPTHER

## 2018-02-06 NOTE — TELEPHONE ENCOUNTER
From: Ivan Prescott  To: NHAN Oshea  Sent: 2/5/2018 3:13 PM PST  Subject: Non-Urgent Medical Question    Yes would like to try I'm not sleeping and taking daily need ematrex out.  ----- Message -----  From: NHAN Oshea  Sent: 2/5/2018 2:02 PM PST  To: Ivan Prescott  Subject: RE: Non-Urgent Medical Question  Are you taking it daily? If so, this is a concern. I would recommend we talk about a daily medication like Topamax. Please look it up and let me know.  NHAN Oshea      ----- Message -----   From: Ivan Prescott   Sent: 2/4/2018 4:23 PM PST   To: NHAN Oshea  Subject: Non-Urgent Medical Question    Need some ematrex was wondering if I can get a bigger dosage please.

## 2018-02-06 NOTE — TELEPHONE ENCOUNTER
From: Ivan Prescott  To: NHAN Oshea  Sent: 2/6/2018 9:17 AM PST  Subject: Non-Urgent Medical Question    I will try both. I had a small tension in my neck yesterday but no headache. Tomorrow is the big day I go back to work  ----- Message -----  From: NHAN Oshea  Sent: 2/6/2018 7:05 AM PST  To: Ivan Prescott  Subject: RE: Non-Urgent Medical Question  Hunter  I am sorry I did not get back late last night before I left the office.  The depakote is a medication to help try to prevent your headaches. We can add Topamax to the regimine or take you off the depakote and just try the topamax. I am concerned that you are having to use the immitrex daily. It is a rescue medicine. Are you taking any ibuprofen first? Let me know what you would like to do: wean off the depakote and start topamax or add the topamax to the current depakote.  I will send over the immitrex.  NHAN Oshea      ----- Message -----   From: Ivan Prescott   Sent: 2/5/2018 3:13 PM PST   To: NHAN Oshea  Subject: Non-Urgent Medical Question    Yes would like to try I'm not sleeping and taking daily need ematrex out.  ----- Message -----  From: NHAN Oshea  Sent: 2/5/2018 2:02 PM PST  To: Ivan Prescott  Subject: RE: Non-Urgent Medical Question  Are you taking it daily? If so, this is a concern. I would recommend we talk about a daily medication like Topamax. Please look it up and let me know.  NHAN Oshea      ----- Message -----   From: Ivan Prescott   Sent: 2/4/2018 4:23 PM PST   To: NHAN Oshea  Subject: Non-Urgent Medical Question    Need some ematrex was wondering if I can get a bigger dosage please.

## 2018-02-12 ENCOUNTER — PATIENT MESSAGE (OUTPATIENT)
Dept: MEDICAL GROUP | Facility: PHYSICIAN GROUP | Age: 43
End: 2018-02-12

## 2018-02-12 RX ORDER — TOPIRAMATE 25 MG/1
25 TABLET ORAL 2 TIMES DAILY
Qty: 90 TAB | Refills: 1 | Status: SHIPPED | OUTPATIENT
Start: 2018-02-12 | End: 2018-04-03

## 2018-02-12 NOTE — TELEPHONE ENCOUNTER
From: Ivan Prescott  To: NHAN Oshea  Sent: 2/12/2018 11:23 AM PST  Subject: Non-Urgent Medical Question    Was not there to  just three injections. Ty  ----- Message -----  From: NHAN Oshea  Sent: 2/12/2018 9:23 AM PST  To: Ivan Prescott  Subject: RE: Non-Urgent Medical Question  Ivan  I am very concerned that you are having daily headaches and vomiting. Did you start that Topramax?  I will have a letter here at the   NHAN Oshea      ----- Message -----   From: Ivan Prescott   Sent: 2/12/2018 8:15 AM PST   To: NHAN Oshea  Subject: Non-Urgent Medical Question    I need a doctor note I went home Thursday afternoon and have been out since. My head is killing me and I am puking everyday.

## 2018-02-14 ENCOUNTER — PATIENT MESSAGE (OUTPATIENT)
Dept: MEDICAL GROUP | Facility: PHYSICIAN GROUP | Age: 43
End: 2018-02-14

## 2018-02-18 ENCOUNTER — PATIENT MESSAGE (OUTPATIENT)
Dept: MEDICAL GROUP | Facility: PHYSICIAN GROUP | Age: 43
End: 2018-02-18

## 2018-02-20 RX ORDER — ONDANSETRON 4 MG/1
4 TABLET, ORALLY DISINTEGRATING ORAL EVERY 4 HOURS PRN
Qty: 30 TAB | Refills: 0 | Status: SHIPPED | OUTPATIENT
Start: 2018-02-20 | End: 2019-08-30

## 2018-02-20 RX ORDER — GABAPENTIN 600 MG/1
1200 TABLET ORAL 3 TIMES DAILY
Qty: 180 TAB | Refills: 0 | Status: SHIPPED | OUTPATIENT
Start: 2018-02-20 | End: 2018-03-19 | Stop reason: SDUPTHER

## 2018-03-15 ENCOUNTER — OFFICE VISIT (OUTPATIENT)
Dept: MEDICAL GROUP | Facility: PHYSICIAN GROUP | Age: 43
End: 2018-03-15
Payer: COMMERCIAL

## 2018-03-15 ENCOUNTER — PATIENT MESSAGE (OUTPATIENT)
Dept: MEDICAL GROUP | Facility: PHYSICIAN GROUP | Age: 43
End: 2018-03-15

## 2018-03-15 VITALS
WEIGHT: 191 LBS | HEIGHT: 73 IN | DIASTOLIC BLOOD PRESSURE: 66 MMHG | SYSTOLIC BLOOD PRESSURE: 126 MMHG | BODY MASS INDEX: 25.31 KG/M2 | TEMPERATURE: 97.7 F | HEART RATE: 117 BPM | OXYGEN SATURATION: 96 %

## 2018-03-15 DIAGNOSIS — I10 ESSENTIAL HYPERTENSION: ICD-10-CM

## 2018-03-15 DIAGNOSIS — G89.29 CHRONIC INTRACTABLE HEADACHE, UNSPECIFIED HEADACHE TYPE: ICD-10-CM

## 2018-03-15 DIAGNOSIS — R45.1 RESTLESSNESS AND AGITATION: ICD-10-CM

## 2018-03-15 DIAGNOSIS — M79.672 PAIN IN BOTH FEET: ICD-10-CM

## 2018-03-15 DIAGNOSIS — H53.8 BLURRY VISION, BILATERAL: ICD-10-CM

## 2018-03-15 DIAGNOSIS — M79.671 PAIN IN BOTH FEET: ICD-10-CM

## 2018-03-15 DIAGNOSIS — E11.42 TYPE 2 DIABETES MELLITUS WITH DIABETIC POLYNEUROPATHY, WITHOUT LONG-TERM CURRENT USE OF INSULIN (HCC): ICD-10-CM

## 2018-03-15 DIAGNOSIS — R51.9 CHRONIC INTRACTABLE HEADACHE, UNSPECIFIED HEADACHE TYPE: ICD-10-CM

## 2018-03-15 LAB
HBA1C MFR BLD: 10.5 % (ref ?–5.8)
INT CON NEG: NEGATIVE
INT CON POS: POSITIVE

## 2018-03-15 PROCEDURE — 99215 OFFICE O/P EST HI 40 MIN: CPT | Performed by: NURSE PRACTITIONER

## 2018-03-15 PROCEDURE — 83036 HEMOGLOBIN GLYCOSYLATED A1C: CPT | Performed by: NURSE PRACTITIONER

## 2018-03-15 RX ORDER — SUMATRIPTAN 50 MG/1
50 TABLET, FILM COATED ORAL
COMMUNITY
End: 2018-03-15 | Stop reason: SDUPTHER

## 2018-03-15 RX ORDER — SUMATRIPTAN 50 MG/1
50 TABLET, FILM COATED ORAL
Qty: 12 TAB | Refills: 6 | Status: SHIPPED | OUTPATIENT
Start: 2018-03-15 | End: 2019-05-21 | Stop reason: SDUPTHER

## 2018-03-15 RX ORDER — LANCETS 30 GAUGE
EACH MISCELLANEOUS
Qty: 100 EACH | Refills: 3 | Status: SHIPPED | OUTPATIENT
Start: 2018-03-15 | End: 2020-03-23

## 2018-03-15 RX ORDER — SUMATRIPTAN SUCCINATE 4 MG/.5ML
1 INJECTION, SOLUTION SUBCUTANEOUS PRN
Qty: 0.5 ML | Refills: 6 | Status: SHIPPED | OUTPATIENT
Start: 2018-03-15 | End: 2019-08-30 | Stop reason: SDUPTHER

## 2018-03-15 ASSESSMENT — PATIENT HEALTH QUESTIONNAIRE - PHQ9: CLINICAL INTERPRETATION OF PHQ2 SCORE: 0

## 2018-03-15 NOTE — PROGRESS NOTES
Chief Complaint   Patient presents with   • Follow-Up   • Migraine   • Neck Pain     stiff neck   • Blurred Vision       Subjective:   Ivan Prescott is a 42 y.o. male here today for evaluation and management of:    Headache  Having frequent headaches.  Feels neck tension.  Has had some nausea.  No recent vomitting.  Has upcoming appointment with Dr. Kimball, neuro to discuss.  Taking immitrex prn, not daily anymore.  He describes both tension type and migraine headaches.  Topamax at night causes intense sleepiness.  Also described some insomnia some days. Complains of new onset blurry close up vision.  Just got new glasses and exam.  No seizure activity reported.  Will do carotid ultrasound.     Uncontrolled type 2 diabetes mellitus with diabetic neuropathy, without long-term current use of insulin ([facility])  Currently FBS around 200 in the morning.  Eating a lot of citrus, burritos.  Metformin 1000 mg daily.    Blurry vision, bilateral  Reports some close up vision problems.  Just had an eye exam and new prescription. Will order carotid ultrasound    Essential hypertension  bp at goal  126/66 today.     DM (diabetes mellitus) (CMS-[facility])  A1c 10.5 today.  Taking metformin.  States  most mornings.  Eating burritos, oranges and not exercising daily due to chronic debilitating headaches.  Patient states that he would like to think about doing just inisulin like they did in the hospital.  Discussed with patient pros/cons.  Will place endocrine referral for consultation.     Pain in both feet  Reports less pain in feet.  Gabapentin only bid is controlling feet. He is currently on FMLa and not working and this may be improving this as he is not on his feet all day    Restlessness and agitation  Reports some nights not being able to sleep and then other nights, if he takes topamax, he can sleep.  No history of manic/depressive episodes.  Speech is loud and pressed.  Able to focus, but very much in to hearing himself talk.  " Wife at side today. She does not describe increased agitation at home.            Current medicines (including changes today)  Current Outpatient Prescriptions   Medication Sig Dispense Refill   • Lancets Misc Lancets order: Lancets for Abbott Precision xtra meter. Sig: use am and prn ssx high or low sugar. 100 Each 3   • Blood Glucose Monitoring Suppl Supplies Misc Test strips order: Test strips for [employer] Precision xtra meter. Sig: use am and prn ssx high or low sugar 100 Each 3   • Blood Glucose Monitoring Suppl Device Meter: Dispense Abbott Precision xtra meter. Sig. Use as directed for blood sugar monitoring. 1 Device 1   • SUMAtriptan (IMITREX) 50 MG Tab Take 1 Tab by mouth Once PRN for Migraine. 12 Tab 6   • ondansetron (ZOFRAN ODT) 4 MG TABLET DISPERSIBLE Take 1 Tab by mouth every four hours as needed (give PO if no IV route available). 30 Tab 0   • gabapentin (NEURONTIN) 600 MG tablet Take 2 Tabs by mouth 3 times a day. 180 Tab 0   • topiramate (TOPAMAX) 25 MG Tab Take 1 Tab by mouth 2 times a day. 90 Tab 1   • metformin (GLUCOPHAGE) 1000 MG tablet TAKE ONE TABLET (1000 MG) BY MOUTH TWICE DAILY WITH MEALS 180 Tab 3   • divalproex (DEPAKOTE) 250 MG Tablet Delayed Response Take 1 Tab by mouth 2 times a day. 60 Tab 1     No current facility-administered medications for this visit.      He  has a past medical history of Allergy; Anxiety; Diabetes (CMS-[facility]); Head ache; Hypertension; and Migraines.    ROS as stated in hpi  No chest pain, no shortness of breath, no abdominal pain       Objective:     Blood pressure 126/66, pulse (!) 117, temperature 36.5 °C (97.7 °F), height 1.854 m (6' 1\"), weight 86.6 kg (191 lb), SpO2 96 %. Body mass index is 25.2 kg/m².   Physical Exam:  Constitutional: Alert, no distress.  Skin: Warm, dry, good turgor,no cyanosis, no rashes in visible areas.  Eye: Equal, round and reactive, conjunctiva clear, lids normal. Red reflex appreciated.   Ears: No tenderness, no discharge.  External " canals are without any significant edema or erythema.  Gross auditory acuity is intact.  Nose: symmetrical without tenderness, no discharge.  Mouth/Throat: lips without lesion.  Oropharynx clear.    Neck: Trachea midline, no masses, no obvious thyroid enlargement..Range of motion within normal limits. Some tightness described as a bump at base of skull on left side. Dissipates with massage  Neuro: Cranial nerves 2-12 grossly intact.  No sensory deficit.  Respiratory: Unlabored respiratory effort, lungs clear to auscultation, no wheezes, no ronchi.  Cardiovascular: Normal S1, S2, no murmur, no edema.  Psych: Alert and oriented x3, normal affect and mood and judgement.        Assessment and Plan:   The following treatment plan was discussed    1. Type 2 diabetes mellitus with diabetic polyneuropathy, without long-term current use of insulin (CMS-[facility])  Chronic, ongoing. Uncontrolled at this time.  Continue with Metformin.  Recommended vigilance with diet as he is not currently able to exercise due to chronic headaches Referral to endocrinology to look at medication/lifestyle options.  - POCT  A1C  - REFERRAL TO ENDOCRINOLOGY    2. Chronic intractable headache, unspecified headache type  Chronic.  Ongoing issue.  Has neuro consult on 4/3/18.  Will order carotid ultrasound to rule out carotid disease.CT, EEG MRI in hospital were all negative.    - US-CAROTID DOPPLER; Future    3. Restlessness and agitation  This is a new problem to me.  Chronic.  Unknown etiology.  This may represent his high-strung nature and his frustration with the chronic headaches and being home and not working. Monitor and follow.     4. Uncontrolled type 2 diabetes mellitus with diabetic neuropathy, without long-term current use of insulin ([facility])  See #1    5. Blurry vision, bilateral  This is a new problem to me.  Carotid doppler ordered.  Monitor results.   - US-CAROTID DOPPLER; Future    6. Essential hypertension  Chronic. Stable.  BP at goal.   Not currently on any medication at this time.  Needs an ace/arb for kidney protection.  I am holding off at this time as I am trying not to add additional meds to his regimine as we investigate his new onset, debilitating headaches.     7. Pain in both feet  Chronic. Stable.  Continue with gabapentin bid    Total 40 minutes face-to-face time spent with patient, with greater than 50% of the total time discussing patient's issues and symptoms as listed above in assessment and plan, as well as managing coordination of care for future evaluation and treatment.  PLEASE NOTE: This dictation was created using voice recognition software. I have made every reasonable attempt to correct obvious errors, but I expect that there are errors of grammar and possibly content that I did not discover before finalizing the note.        Followup: Return in about 3 months (around 6/15/2018).

## 2018-03-15 NOTE — ASSESSMENT & PLAN NOTE
Reports less pain in feet.  Gabapentin only bid is controlling feet. He is currently on FMLa and not working and this may be improving this as he is not on his feet all day

## 2018-03-15 NOTE — ASSESSMENT & PLAN NOTE
Reports some nights not being able to sleep and then other nights, if he takes topamax, he can sleep.  No history of manic/depressive episodes.  Speech is loud and pressed.  Able to focus, but very much in to hearing himself talk.  Wife at side today. She does not describe increased agitation at home.

## 2018-03-15 NOTE — ASSESSMENT & PLAN NOTE
A1c 10.5 today.  Taking metformin.  States  most mornings.  Eating burritos, oranges and not exercising daily due to chronic debilitating headaches.  Patient states that he would like to think about doing just inisulin like they did in the hospital.  Discussed with patient pros/cons.  Will place endocrine referral for consultation.

## 2018-03-15 NOTE — ASSESSMENT & PLAN NOTE
Currently FBS around 200 in the morning.  Eating a lot of citrus, burritos.  Metformin 1000 mg daily.

## 2018-03-15 NOTE — ASSESSMENT & PLAN NOTE
Having frequent headaches.  Feels neck tension.  Has had some nausea.  No recent vomitting.  Has upcoming appointment with Dr. Kimball, neuro to discuss.  Taking immitrex prn, not daily anymore.  He describes both tension type and migraine headaches.  Topamax at night causes intense sleepiness.  Also described some insomnia some days. Complains of new onset blurry close up vision.  Just got new glasses and exam.  No seizure activity reported.  Will do carotid ultrasound.

## 2018-03-15 NOTE — ASSESSMENT & PLAN NOTE
Reports some close up vision problems.  Just had an eye exam and new prescription. Will order carotid ultrasound

## 2018-03-17 ENCOUNTER — PATIENT MESSAGE (OUTPATIENT)
Dept: MEDICAL GROUP | Facility: PHYSICIAN GROUP | Age: 43
End: 2018-03-17

## 2018-03-19 RX ORDER — SUMATRIPTAN 6 MG/.5ML
6 INJECTION, SOLUTION SUBCUTANEOUS ONCE
Qty: 0.5 ML | Refills: 3 | Status: SHIPPED | OUTPATIENT
Start: 2018-03-19 | End: 2018-03-19

## 2018-03-19 RX ORDER — GABAPENTIN 600 MG/1
1200 TABLET ORAL 3 TIMES DAILY
Qty: 180 TAB | Refills: 6 | Status: SHIPPED | OUTPATIENT
Start: 2018-03-19 | End: 2018-09-30 | Stop reason: SDUPTHER

## 2018-03-19 NOTE — TELEPHONE ENCOUNTER
Was the patient seen in the last year in this department? Yes     Does patient have an active prescription for medications requested? No     Received Request Via: Patient   none

## 2018-03-27 ENCOUNTER — PATIENT MESSAGE (OUTPATIENT)
Dept: MEDICAL GROUP | Facility: PHYSICIAN GROUP | Age: 43
End: 2018-03-27

## 2018-03-27 RX ORDER — DIVALPROEX SODIUM 250 MG/1
250 TABLET, DELAYED RELEASE ORAL 2 TIMES DAILY
Qty: 180 TAB | Refills: 1 | Status: SHIPPED | OUTPATIENT
Start: 2018-03-27 | End: 2018-04-03

## 2018-03-27 NOTE — TELEPHONE ENCOUNTER
Requested Prescriptions     Signed Prescriptions Disp Refills   • divalproex (DEPAKOTE) 250 MG Tablet Delayed Response 180 Tab 1     Sig: Take 1 Tab by mouth 2 times a day.     Authorizing Provider: EFRAIN MILLER A.P.R.N.

## 2018-04-02 ENCOUNTER — APPOINTMENT (OUTPATIENT)
Dept: RADIOLOGY | Facility: MEDICAL CENTER | Age: 43
End: 2018-04-02
Attending: NURSE PRACTITIONER
Payer: COMMERCIAL

## 2018-04-03 ENCOUNTER — OFFICE VISIT (OUTPATIENT)
Dept: NEUROLOGY | Facility: MEDICAL CENTER | Age: 43
End: 2018-04-03
Payer: COMMERCIAL

## 2018-04-03 ENCOUNTER — TELEPHONE (OUTPATIENT)
Dept: MEDICAL GROUP | Facility: PHYSICIAN GROUP | Age: 43
End: 2018-04-03

## 2018-04-03 VITALS
RESPIRATION RATE: 16 BRPM | HEART RATE: 104 BPM | OXYGEN SATURATION: 98 % | TEMPERATURE: 98.1 F | SYSTOLIC BLOOD PRESSURE: 108 MMHG | HEIGHT: 73 IN | WEIGHT: 192.35 LBS | DIASTOLIC BLOOD PRESSURE: 70 MMHG | BODY MASS INDEX: 25.49 KG/M2

## 2018-04-03 DIAGNOSIS — E56.9 VITAMIN DEFICIENCY: ICD-10-CM

## 2018-04-03 DIAGNOSIS — G62.9 NEUROPATHY: ICD-10-CM

## 2018-04-03 DIAGNOSIS — G40.909 SEIZURE CEREBRAL (HCC): ICD-10-CM

## 2018-04-03 DIAGNOSIS — G44.001 INTRACTABLE CLUSTER HEADACHE SYNDROME, UNSPECIFIED CHRONICITY PATTERN: ICD-10-CM

## 2018-04-03 PROCEDURE — 99204 OFFICE O/P NEW MOD 45 MIN: CPT | Performed by: PSYCHIATRY & NEUROLOGY

## 2018-04-03 RX ORDER — DIVALPROEX SODIUM 500 MG/1
500 TABLET, EXTENDED RELEASE ORAL 2 TIMES DAILY
Qty: 60 TAB | Refills: 3 | Status: SHIPPED | OUTPATIENT
Start: 2018-04-03 | End: 2018-08-21 | Stop reason: SDUPTHER

## 2018-04-03 NOTE — TELEPHONE ENCOUNTER
VOICEMAIL  1. Caller Name: Ivan                      Call Back Number: [phone] (home)       2. Message: Patient saw Dr Kimball today and he has ordered a 48 hour EEG and some lab work. He is coming in tomorrow morning to do the labs, they said it could be a couple weeks or months for the EEG. His FMLA is good until May 1st, but he needs a note for his work stating if can go back to work or not.     3. Patient approves office to leave a detailed voicemail/MyChart message: N\A

## 2018-04-03 NOTE — PROGRESS NOTES
NEUROLOGY NOTE    Referring Physician  NHAN Oshea    CHIEF COMPLAINT:  Newly onset seizure and bad headache Nov 2017-- one seizure Jan 2018  Chief Complaint   Patient presents with   • Establish Care     seizure       PRESENT ILLNESS:     Migraine -- the patient used to have    Since Nov 2017--- developed new headache, started having bad headache  Jan 24 th 2018--- developed one seizure  Was found to have have diabetic mellitus March 2017    The patient is able to describe 3 kinds of headache  1. The worse one--- temporal to frontal-- lasting for half hour-- came out    2. Mediocre  3. mild    Since   This is a 42-year-old male with chronic headaches   apparently, but more so and severe over the last 3 weeks.  Daily headaches   are reported.  He reports that those feel like migraine headaches.  He was   never officially diagnosed.  He knows the difference between tension headaches   and migraine headaches.  Per his report, he is sensitive to light.  Today, he   woke up, went to work, had a headache and suddenly apparently had a fall and   shaking spell.  He woke up in the ambulance.  Per report, he had a witnessed   seizure there, but there is no specific report from coworkers.  Patient in the   field had a blood sugar of 359.  The patient was brought to the emergency   room, is seen by the ER physician who did a workup including a CAT scan and   laboratory workup, which was so far negative except for abnormal lactic acid.    The patient then per report in the ER had another witnessed seizure spell and   the patient at this time has a severe headache.  There was no postictal state   here in the ER.  The patient does not remember the occurrence at work.    Again, he remembers only being at work at his workplace and then waking up in   the ambulance.  The patient never had seizures before.  There is no prior loss   of consciousness or other trauma associated.  The patient denies history of   head  injury.  The patient at this time complains of light sensitivity and   ongoing headaches, currently frontal starting on the right, now bilateral   frontal lobes.  He a lot of times has posterior headaches.  He does have an   aura he usually knows when his headache is coming on, a lot of times it helps   when he lays in a dark room and is quiet.  The patient at this time is   otherwise accompanied by his family here giving some additional history.    PAST MEDICAL HISTORY:  Past Medical History:   Diagnosis Date   • Allergy    • Anxiety    • Diabetes (CMS-[facility])    • Head ache    • Hypertension    • Migraines        PAST SURGICAL HISTORY:  Past Surgical History:   Procedure Laterality Date   • EYE SURGERY     • KNEE ARTHROTOMY      plica removal 2016       FAMILY HISTORY:  Family History   Problem Relation Age of Onset   • No Known Problems Mother    • No Known Problems Father    • No Known Problems Sister    • Diabetes Paternal Grandmother        SOCIAL HISTORY:  Social History     Social History   • Marital status:      Spouse name: N/A   • Number of children: N/A   • Years of education: N/A     Occupational History   • Not on file.     Social History Main Topics   • Smoking status: Never Smoker   • Smokeless tobacco: Current User     Types: Chew   • Alcohol use No   • Drug use: No   • Sexual activity: Yes     Partners: Female     Other Topics Concern   • Not on file     Social History Narrative   • No narrative on file     ALLERGIES:  Allergies   Allergen Reactions   • Acetaminophen Hives     TOBHX  History   Smoking Status   • Never Smoker   Smokeless Tobacco   • Current User   • Types: Chew     ALCHX  History   Alcohol Use No     DRUGHX  History   Drug Use No           MEDICATIONS:  Current Outpatient Prescriptions   Medication   • divalproex (DEPAKOTE) 250 MG Tablet Delayed Response   • gabapentin (NEURONTIN) 600 MG tablet   • Lancets Misc   • Blood Glucose Monitoring Suppl Supplies Misc   • Blood Glucose  "Monitoring Suppl Device   • SUMAtriptan (IMITREX) 50 MG Tab   • metformin (GLUCOPHAGE) 1000 MG tablet   • SUMAtriptan Succinate 4 MG/0.5ML Solution Auto-injector   • ondansetron (ZOFRAN ODT) 4 MG TABLET DISPERSIBLE   • topiramate (TOPAMAX) 25 MG Tab     No current facility-administered medications for this visit.        REVIEW OF SYSTEM:    Constitutional: Denies fevers, Denies weight changes   Eyes: Denies changes in vision, no eye pain   Ears/Nose/Throat/Mouth: Denies nasal congestion or sore throat   Cardiovascular: Denies chest pain or palpitations   Respiratory: Denies SOB.   Gastrointestinal/Hepatic: Denies abdominal pain, nausea, vomiting, diarrhea, constipation or GI bleeding   Genitourinary: Denies bladder dysfunction, dysuria or frequency   Musculoskeletal/Rheum: Denies joint pain and swelling   Skin/Breast: Denies rash, denies breast lumps or discharge   Neurological: migraine, headache  Psychiatric: denies mood disorder   Endocrine: denies hx of diabetes or thyroid dysfunction   Heme/Oncology/Lymph Nodes: Denies enlarged lymph nodes, denies brusing or known bleeding disorder   Allergic/Immunologic: Denies hx of allergies         PHYSICAL AND NEUROLOGICAL EXMAINATIONS:  VITAL SIGNS: /70   Pulse (!) 104   Temp 36.7 °C (98.1 °F)   Resp 16   Ht 1.854 m (6' 1\")   Wt 87.2 kg (192 lb 5.6 oz)   SpO2 98%   BMI 25.38 kg/m²   CURRENT WEIGHT: BMI: Body mass index is 25.38 kg/m².  PREVIOUS WEIGHTS:  Wt Readings from Last 25 Encounters:   04/03/18 87.2 kg (192 lb 5.6 oz)   03/15/18 86.6 kg (191 lb)   01/31/18 83.5 kg (184 lb)   01/24/18 86.2 kg (190 lb)   01/18/18 85.7 kg (189 lb)   10/24/17 85.3 kg (188 lb)   05/26/17 84.8 kg (187 lb)   05/15/17 84.8 kg (187 lb)   04/19/17 87.1 kg (192 lb)   03/22/17 85.7 kg (189 lb)   01/03/13 95.3 kg (210 lb)       General appearance of patient: WDWN(+) NAD(+)    EYES  o Fundus : Papilledem(-) Exudates(-) Hemorrhage(-)  Nervous System  Orientation to time, place and " person(+)  Memory normal(+)  Language: aphasia(-)  Knowledge: past(+) Current(+)  Attention(+)  Cranial Nerves  • Nerve 2: intact  • Nerve 3,4,6: intact  • Nerve 5 : intact  • Nerve 7: intact  • Nerve 8: intact  • Nerve 9 & 10: intact  • Nerve 11: intact  • Nerve 12: intact  Muscle Power and muscle tone: symmetric, normal in upper and lower  Sensory System: Pin sensation intact(+)  Reflexes: symmetric throughout  Cerebellar Function FNP normal   Gait : Steady(+) TandemGait steady(+)  Heart and Vascular  Peripheral Vasucular system : Edema (-) Swelling(-)  RHB, Breathing sound clear  abdomen bowel sound normoactive  Extremities freely moveable  Joints no contracture       NEUROIMAGING: I reviewed the MRI/CT of brain       LAB:          Tried Topamax, could not tolerate Topamax  Took Gabapentin for painful neuropathy    IMPRESSION:    1. Newly onset of seizure Jan  2. Worsening headache since Nov 2017  3. Hx of migraine  4. Newly diagnosed as of Diabetic Mellitus 2017  5. Diabetic Mellitus neuropathy-- under gabapentin    PLAN/RECOMMENDATIONS:    It is reasonable to have cardiac monitoring-- although the Hx is more consistent with seizure( post-ictal confusion)  Even the patient's bad headache might be seizures too( because of post-ictal agitation and mild confusion)    Explain to the patient that the worsening of headache since Nov 2017 could be secondary to one of the following     1. Stressful like-- could worsen the underlined migraine ( Advil 30# per month, allergy to ASA)   2. Inflammation ( autoimmune), Infection ( fungus, chronic infection), neoplastic ( unlikely) etc    We will offer 2 days 24 hour ambulatory EEG ( one routine EEG might not be powerful enough to record abnormality)  We will need offer the patient blood tests too, we might need to offer spinal tap in the future    We will up the Depakote ER 500mg two times per day daily ---  If any side effects, please let us know  If Depakote is not helpful  please notify us in one week or so        We told the patient not to drive 3 months after any spell of passing out        SIGNATURE:  ShayyCass Jigar      CC:  NHAN Oshea    1/24/2018  3. INCIDENTALLY NOTED ORIF RIGHT ORBITAL FLOOR BLOWOUT FRACTURE WITH MESH REPAIR.        1/24/2018  INTERPRETATION: EEG by Dr Lau  This is an abnormal video EEG recording in the awake and drowsy state(s), due to excessive fast background activity due to the use of sedatives. There were events captured during and in between photic stimulation, which were NOT epileptic in nature. No seizures captured. Clinical correlation is recommended.     Note:This EEG does not rule out epilepsy.  If the clinical suspicion remains high for seizures, a prolonged recording to capture clinical or subclinical events may be helpful.

## 2018-04-03 NOTE — LETTER
April 3, 2018         Patient: Ivan Prescott   YOB: 1975   Date of Visit: 4/3/2018           To Whom it May Concern:    Ivan Prescott is a patient of mine.  He is not cleared to return to work until he completes testing being done by the neurologist.      If you have any questions or concerns, please don't hesitate to call.        Sincerely,           NHAN Oshea  Electronically Signed

## 2018-04-03 NOTE — PATIENT INSTRUCTIONS
IMPRESSION:    1. Newly onset of seizure Jan  2. Worsening headache since Nov 2017  3. Hx of migraine  4. Newly diagnosed as of Diabetic Mellitus 2017  5. Diabetic Mellitus neuropathy-- under gabapentin    PLAN/RECOMMENDATIONS:    It is reasonable to have cardiac monitoring-- although the Hx is more consistent with seizure( post-ictal confusion)  Even the patient's bad headache might be seizures too( because of post-ictal agitation and mild confusion)    Explain to the patient that the worsening of headache since Nov 2017 could be secondary to one of the following     1. Stressful like-- could worsen the underlined migraine ( Advil 30# per month, allergy to ASA)   2. Inflammation ( autoimmune), Infection ( fungus, chronic infection), neoplastic ( unlikely) etc    We will offer 2 days 24 hour ambulatory EEG ( one routine EEG might not be powerful enough to record abnormality)  We will need offer the patient blood tests too, we might need to offer spinal tap in the future    We will up the Depakote ER 500mg two times per day daily ---  If any side effects, please let us know  If Depakote is not helpful please notify us in one week or so        We told the patient not to drive 3 months after any spell of passing out        SIGNATURE:  Kalen Kimball      CC:  NHAN Oshea    1/24/2018  3. INCIDENTALLY NOTED ORIF RIGHT ORBITAL FLOOR BLOWOUT FRACTURE WITH MESH REPAIR.        1/24/2018  INTERPRETATION: EEG by Dr Lau  This is an abnormal video EEG recording in the awake and drowsy state(s), due to excessive fast background activity due to the use of sedatives. There were events captured during and in between photic stimulation, which were NOT epileptic in nature. No seizures captured. Clinical correlation is recommended.     Note:This EEG does not rule out epilepsy.  If the clinical suspicion remains high for seizures, a prolonged recording to capture clinical or subclinical events may be helpful.

## 2018-04-04 ENCOUNTER — HOSPITAL ENCOUNTER (OUTPATIENT)
Dept: LAB | Facility: MEDICAL CENTER | Age: 43
End: 2018-04-04
Attending: PSYCHIATRY & NEUROLOGY
Payer: COMMERCIAL

## 2018-04-04 DIAGNOSIS — G62.9 NEUROPATHY: ICD-10-CM

## 2018-04-04 DIAGNOSIS — E56.9 VITAMIN DEFICIENCY: ICD-10-CM

## 2018-04-04 DIAGNOSIS — G40.909 SEIZURE CEREBRAL (HCC): ICD-10-CM

## 2018-04-04 DIAGNOSIS — G44.001 INTRACTABLE CLUSTER HEADACHE SYNDROME, UNSPECIFIED CHRONICITY PATTERN: ICD-10-CM

## 2018-04-04 LAB
25(OH)D3 SERPL-MCNC: 19 NG/ML (ref 30–100)
CRP SERPL HS-MCNC: 0.22 MG/DL (ref 0–0.75)
ERYTHROCYTE [SEDIMENTATION RATE] IN BLOOD BY WESTERGREN METHOD: 8 MM/HOUR (ref 0–15)
RHEUMATOID FACT SER IA-ACNC: <10 IU/ML (ref 0–14)
THYROPEROXIDASE AB SERPL-ACNC: <0.2 IU/ML (ref 0–9)
VALPROATE SERPL-MCNC: 33.3 UG/ML (ref 50–100)
VIT B12 SERPL-MCNC: 702 PG/ML (ref 211–911)

## 2018-04-04 PROCEDURE — 82607 VITAMIN B-12: CPT

## 2018-04-04 PROCEDURE — 84160 ASSAY OF PROTEIN ANY SOURCE: CPT | Mod: 91

## 2018-04-04 PROCEDURE — 84156 ASSAY OF PROTEIN URINE: CPT

## 2018-04-04 PROCEDURE — 84207 ASSAY OF VITAMIN B-6: CPT

## 2018-04-04 PROCEDURE — 82595 ASSAY OF CRYOGLOBULIN: CPT

## 2018-04-04 PROCEDURE — 36415 COLL VENOUS BLD VENIPUNCTURE: CPT

## 2018-04-04 PROCEDURE — 82306 VITAMIN D 25 HYDROXY: CPT

## 2018-04-04 PROCEDURE — 86431 RHEUMATOID FACTOR QUANT: CPT

## 2018-04-04 PROCEDURE — 80164 ASSAY DIPROPYLACETIC ACD TOT: CPT

## 2018-04-04 PROCEDURE — 86225 DNA ANTIBODY NATIVE: CPT

## 2018-04-04 PROCEDURE — 82784 ASSAY IGA/IGD/IGG/IGM EACH: CPT | Mod: 91

## 2018-04-04 PROCEDURE — 86341 ISLET CELL ANTIBODY: CPT

## 2018-04-04 PROCEDURE — 86235 NUCLEAR ANTIGEN ANTIBODY: CPT | Mod: 91

## 2018-04-04 PROCEDURE — 84165 PROTEIN E-PHORESIS SERUM: CPT

## 2018-04-04 PROCEDURE — 86140 C-REACTIVE PROTEIN: CPT

## 2018-04-04 PROCEDURE — 86147 CARDIOLIPIN ANTIBODY EA IG: CPT

## 2018-04-04 PROCEDURE — 86376 MICROSOMAL ANTIBODY EACH: CPT

## 2018-04-04 PROCEDURE — 86038 ANTINUCLEAR ANTIBODIES: CPT

## 2018-04-04 PROCEDURE — 86334 IMMUNOFIX E-PHORESIS SERUM: CPT | Mod: 91

## 2018-04-04 PROCEDURE — 85652 RBC SED RATE AUTOMATED: CPT

## 2018-04-04 PROCEDURE — 86255 FLUORESCENT ANTIBODY SCREEN: CPT

## 2018-04-04 NOTE — TELEPHONE ENCOUNTER
Please let patient know that I have written a letter for his work.  He  Can pick it up tomorrow when he comes in for work up at .

## 2018-04-06 LAB
ANCA IGG TITR SER IF: NORMAL {TITER}
CARDIOLIPIN IGA SER IA-ACNC: 1 APL (ref 0–11)
CARDIOLIPIN IGG SER IA-ACNC: 2 GPL (ref 0–14)
CARDIOLIPIN IGM SER IA-ACNC: 5 MPL (ref 0–12)
ENA SS-B IGG SER IA-ACNC: 0 AU/ML (ref 0–40)
NUCLEAR IGG SER QL IA: NORMAL
SSA52 R0ENA AB IGG Q0420: 1 AU/ML (ref 0–40)
SSA60 R0ENA AB IGG Q0419: 3 AU/ML (ref 0–40)

## 2018-04-07 LAB
ALBUMIN SERPL-MCNC: 4.77 G/DL (ref 3.75–5.01)
ALBUMIN SERPL-MCNC: 4.85 G/DL (ref 3.75–5.01)
ALPHA1 GLOB SERPL ELPH-MCNC: 0.3 G/DL (ref 0.19–0.46)
ALPHA1 GLOB SERPL ELPH-MCNC: 0.3 G/DL (ref 0.19–0.46)
ALPHA2 GLOB SERPL ELPH-MCNC: 0.82 G/DL (ref 0.48–1.05)
ALPHA2 GLOB SERPL ELPH-MCNC: 0.85 G/DL (ref 0.48–1.05)
B-GLOBULIN SERPL ELPH-MCNC: 1.05 G/DL (ref 0.48–1.1)
B-GLOBULIN SERPL ELPH-MCNC: 1.12 G/DL (ref 0.48–1.1)
GAD65 AB SER IA-ACNC: <5 IU/ML (ref 0–5)
GAMMA GLOB SERPL ELPH-MCNC: 0.91 G/DL (ref 0.62–1.51)
GAMMA GLOB SERPL ELPH-MCNC: 0.93 G/DL (ref 0.62–1.51)
IGA SERPL-MCNC: 267 MG/DL (ref 68–408)
IGA SERPL-MCNC: 293 MG/DL (ref 68–408)
IGG SERPL-MCNC: 1040 MG/DL (ref 768–1632)
IGG SERPL-MCNC: 1090 MG/DL (ref 768–1632)
IGM SERPL-MCNC: 64 MG/DL (ref 35–263)
IGM SERPL-MCNC: 68 MG/DL (ref 35–263)
INTERPRETATION SERPL IFE-IMP: ABNORMAL
INTERPRETATION SERPL IFE-IMP: NORMAL
INTERPRETATION SERPL IFE-IMP: NORMAL
MONOCLON BAND OBS SERPL: ABNORMAL
PATHOLOGY STUDY: ABNORMAL
PATHOLOGY STUDY: NORMAL
PROT SERPL-MCNC: 7.9 G/DL (ref 6–8.3)
PROT SERPL-MCNC: 8 G/DL (ref 6–8.3)
PROT UR-MCNC: 7.5 MG/DL (ref 0–15)

## 2018-04-08 LAB — VIT B6 SERPL-MCNC: 36.2 NMOL/L (ref 20–125)

## 2018-04-09 ENCOUNTER — HOSPITAL ENCOUNTER (OUTPATIENT)
Dept: RADIOLOGY | Facility: MEDICAL CENTER | Age: 43
End: 2018-04-09
Attending: NURSE PRACTITIONER
Payer: COMMERCIAL

## 2018-04-09 DIAGNOSIS — G89.29 CHRONIC INTRACTABLE HEADACHE, UNSPECIFIED HEADACHE TYPE: ICD-10-CM

## 2018-04-09 DIAGNOSIS — H53.8 BLURRY VISION, BILATERAL: ICD-10-CM

## 2018-04-09 DIAGNOSIS — R51.9 CHRONIC INTRACTABLE HEADACHE, UNSPECIFIED HEADACHE TYPE: ICD-10-CM

## 2018-04-09 LAB — CRYOGLOB SER QL 3D COLD INC: NORMAL

## 2018-04-09 PROCEDURE — 93880 EXTRACRANIAL BILAT STUDY: CPT

## 2018-04-12 ENCOUNTER — OFFICE VISIT (OUTPATIENT)
Dept: ENDOCRINOLOGY | Facility: MEDICAL CENTER | Age: 43
End: 2018-04-12
Payer: COMMERCIAL

## 2018-04-12 VITALS
HEART RATE: 128 BPM | HEIGHT: 73 IN | OXYGEN SATURATION: 96 % | BODY MASS INDEX: 25.42 KG/M2 | WEIGHT: 191.8 LBS | DIASTOLIC BLOOD PRESSURE: 86 MMHG | SYSTOLIC BLOOD PRESSURE: 132 MMHG

## 2018-04-12 DIAGNOSIS — I10 ESSENTIAL HYPERTENSION: ICD-10-CM

## 2018-04-12 PROCEDURE — 99214 OFFICE O/P EST MOD 30 MIN: CPT | Performed by: PHYSICIAN ASSISTANT

## 2018-04-12 NOTE — PROGRESS NOTES
New Patient Consult Note  Referred by: NHAN Oshea    Reason for consult: Diabetes Management Type 2    HPI:  Ivan Prescott is a 42 y.o. old patient who is seeing us today for diabetes care.  This is a pleasant patient with diabetes and I appreciate the opportunity to participate in the care of this patient.  This is a new patient with me today.    Labs of 3/15/18 HbA1c is 10.5, GFR >60  Labs of 4/12/18 HbA1c was 12    BG Diary:4/12/2018  In the AM: did not bring  States in the -400     Has been Diabetic since several years  Has a Glucagon pen at home: no    1. Uncontrolled type 2 diabetes mellitus with diabetic neuropathy, without long-term current use of insulin ([facility])  This is a new patient with me on 4/12/18  He is on:  1.   Metformin 1000mg    STOP:  Metformin    Start   1.  Xigduo 5/1000 one in the AM one in the PM  2.  Ozempic 0.25 once a week    2. Essential hypertension  States was 316 pounds 3 years ago.      ROS:   Constitutional: No change in weight , No fatigue, No night sweats.  HEENT: No Headache.  Eyes:  No blurred vision, No visual changes.  Cardiac: No chest pain, No palpitations.  Resp: No shortness of breath, No cough,   Gastro: No nausea or vomiting, No diarrhea.  Neuro: Denies numbness or tinging in bilateral feet or hands, and no loss of sensation.  Endo: No heat or cold intolerance.  : No polyuria, No polydipsia, No chronic UTI's.  Lower extremities: No lower leg edema bilateral.  All other systems were reviewed and were negative.    Past Medical History:  Patient Active Problem List    Diagnosis Date Noted   • Headache 01/25/2018     Priority: High   • Nausea and vomiting 01/25/2018     Priority: High   • DM (diabetes mellitus) (CMS-HCC) 01/24/2018     Priority: Medium   • Essential hypertension 04/19/2017     Priority: Medium   • Vitamin deficiency 04/03/2018   • Neuropathy (CMS-HCC) 04/03/2018   • Seizure cerebral 04/03/2018   • Restlessness and agitation 03/15/2018    • Blurry vision, bilateral 03/15/2018   • Uncontrolled type 2 diabetes mellitus with diabetic neuropathy, without long-term current use of insulin (HCC) 04/19/2017   • Pain in both feet 03/22/2017   • Erectile dysfunction 03/22/2017   • Chest skin lesion 03/22/2017   • Hx of migraines 03/22/2017       Past Surgical History:  Past Surgical History:   Procedure Laterality Date   • EYE SURGERY     • KNEE ARTHROTOMY      plica removal 2016       Allergies:  Acetaminophen    Social History:  Social History     Social History   • Marital status:      Spouse name: N/A   • Number of children: N/A   • Years of education: N/A     Occupational History   • Not on file.     Social History Main Topics   • Smoking status: Never Smoker   • Smokeless tobacco: Current User     Types: Chew   • Alcohol use No   • Drug use: No   • Sexual activity: Yes     Partners: Female     Other Topics Concern   • Not on file     Social History Narrative   • No narrative on file       Family History:  Family History   Problem Relation Age of Onset   • No Known Problems Mother    • No Known Problems Father    • No Known Problems Sister    • Diabetes Paternal Grandmother        Medications:    Current Outpatient Prescriptions:   •  divalproex ER (DEPAKOTE ER) 500 MG TABLET SR 24 HR, Take 1 Tab by mouth 2 Times a Day., Disp: 60 Tab, Rfl: 3  •  gabapentin (NEURONTIN) 600 MG tablet, Take 2 Tabs by mouth 3 times a day., Disp: 180 Tab, Rfl: 6  •  Lancets Misc, Lancets order: Lancets for Abbott Precision xtra meter. Sig: use am and prn ssx high or low sugar., Disp: 100 Each, Rfl: 3  •  Blood Glucose Monitoring Suppl Supplies Misc, Test strips order: Test strips for Abbott Precision xtra meter. Sig: use am and prn ssx high or low sugar, Disp: 100 Each, Rfl: 3  •  Blood Glucose Monitoring Suppl Device, Meter: Dispense Abbott Precision xtra meter. Sig. Use as directed for blood sugar monitoring., Disp: 1 Device, Rfl: 1  •  SUMAtriptan (IMITREX) 50 MG  "Tab, Take 1 Tab by mouth Once PRN for Migraine., Disp: 12 Tab, Rfl: 6  •  SUMAtriptan Succinate 4 MG/0.5ML Solution Auto-injector, Inject 1 Application as instructed as needed., Disp: 0.5 mL, Rfl: 6  •  ondansetron (ZOFRAN ODT) 4 MG TABLET DISPERSIBLE, Take 1 Tab by mouth every four hours as needed (give PO if no IV route available)., Disp: 30 Tab, Rfl: 0  •  metformin (GLUCOPHAGE) 1000 MG tablet, TAKE ONE TABLET (1000 MG) BY MOUTH TWICE DAILY WITH MEALS, Disp: 180 Tab, Rfl: 3      Physical Examination:   Vital signs: /86   Pulse (!) 128   Ht 1.854 m (6' 0.99\")   Wt 87 kg (191 lb 12.8 oz)   SpO2 96%   BMI 25.31 kg/m²   General: No distress, cooperative, well dressed and well nourished.   Eyes: No scleral icterus or discharge, No hyposphagma  ENMT: Normal on external inspection of nose, lips, No nasal drainage   Neck: No abnormal masses on inspection  Resp: Normal effort, Bilateral clear to auscultation, No wheezing, No rales  CVS: Regular rate and rhythm, S1 S2 normal, No murmur. No gallop  Extremities: No edema bilateral extremities  Neuro: Alert and oriented  Skin: No rash, No Ulcers  Psych: Normal mood and affect      Assessment and Plan:    1. Uncontrolled type 2 diabetes mellitus with diabetic neuropathy, without long-term current use of insulin (HCC)    STOP:  Metformin    Start   1.  Xigduo 5/1000 one in the AM one in the PM  2.  Ozempic 0.25 once a week    2. Essential hypertension  This looks good.      The total time spent seeing this patient today face to face in consultation, and formulating an action plan for this visit was greater than 25 minutes. > Than 50% of this time was spent counseling, discussing problems documented above and below, coordinating care and answering questions by the physician assistant.  We developed a diabetes care plan for this patient today.      Return in about 3 weeks (around 5/3/2018).    Blood glucose log: Check BG in the morning when wake up, before lunch or " dinner and before bed.  So three times a day.  Always bring BG diary to the next office visit.     This patient during there office visit was started on new medication xigduo, ozempic.  Side effects of new medications were discussed with the patient today in the office. The patient was supplied paperwork on this new medication.    Thank you kindly for allowing me to participate in the diabetes care plan for this patient.    Alton Morales PA-C, BC-ADM  Board Certified - Advanced Diabetes Management  04/12/18    CC:   NHAN Oshea

## 2018-04-16 ENCOUNTER — OFFICE VISIT (OUTPATIENT)
Dept: MEDICAL GROUP | Facility: PHYSICIAN GROUP | Age: 43
End: 2018-04-16
Payer: COMMERCIAL

## 2018-04-16 VITALS
WEIGHT: 193 LBS | DIASTOLIC BLOOD PRESSURE: 66 MMHG | TEMPERATURE: 98.1 F | HEART RATE: 97 BPM | SYSTOLIC BLOOD PRESSURE: 100 MMHG | OXYGEN SATURATION: 96 % | RESPIRATION RATE: 14 BRPM | BODY MASS INDEX: 25.58 KG/M2 | HEIGHT: 73 IN

## 2018-04-16 DIAGNOSIS — G40.909 SEIZURE CEREBRAL (HCC): ICD-10-CM

## 2018-04-16 DIAGNOSIS — G44.001 INTRACTABLE CLUSTER HEADACHE SYNDROME, UNSPECIFIED CHRONICITY PATTERN: ICD-10-CM

## 2018-04-16 PROCEDURE — 99213 OFFICE O/P EST LOW 20 MIN: CPT | Performed by: NURSE PRACTITIONER

## 2018-04-16 RX ORDER — DAPAGLIFLOZIN AND METFORMIN HYDROCHLORIDE 5; 1000 MG/1; MG/1
TABLET, FILM COATED, EXTENDED RELEASE ORAL
COMMUNITY
End: 2018-05-03

## 2018-04-16 NOTE — ASSESSMENT & PLAN NOTE
Continues to have daily headaches.  They have improved greatly.  Light triggers these.  Seeing Dr. Kimball.  Recent carotid ultrasound negative for occlusion.

## 2018-04-16 NOTE — PROGRESS NOTES
Chief Complaint   Patient presents with   • Follow-Up   • Headache   • Results     labs    • Other     paperwork        Subjective:   Ivan Prescott is a 42 y.o. male here today for evaluation and management of:    Headache  Continues to have daily headaches.  They have improved greatly.  Light triggers these.  Seeing Dr. Kimball.  Recent carotid ultrasound negative for occlusion.     Uncontrolled type 2 diabetes mellitus with diabetic neuropathy, without long-term current use of insulin (HCC)  Seeing Endocrinology.  Started on Janumet and an injectable.  Has follow up in 2 weeks.  Blood sugars are improving.       Patient is here to do paperwork for his disability claim.  He has been off work since February due to chronic, debilitating headaches, one seizure episode.  Seeing Dr. Kimball, neuro and recently seeing endocrinology for uncontrolled diabetes.     Current medicines (including changes today)  Current Outpatient Prescriptions   Medication Sig Dispense Refill   • Dapagliflozin-Metformin HCl ER (XIGDUO XR) 5-1000 MG TABLET SR 24 HR Take  by mouth.     • Semaglutide (OZEMPIC) 0.25 or 0.5 MG/DOSE Solution Pen-injector Inject  as instructed.     • divalproex ER (DEPAKOTE ER) 500 MG TABLET SR 24 HR Take 1 Tab by mouth 2 Times a Day. (Patient taking differently: Take 1,000 mg by mouth 2 Times a Day.) 60 Tab 3   • gabapentin (NEURONTIN) 600 MG tablet Take 2 Tabs by mouth 3 times a day. 180 Tab 6   • Lancets Misc Lancets order: Lancets for Abbott Precision xtra meter. Sig: use am and prn ssx high or low sugar. 100 Each 3   • Blood Glucose Monitoring Suppl Supplies Misc Test strips order: Test strips for Abbott Precision xtra meter. Sig: use am and prn ssx high or low sugar 100 Each 3   • Blood Glucose Monitoring Suppl Device Meter: Dispense Abbott Precision xtra meter. Sig. Use as directed for blood sugar monitoring. 1 Device 1   • SUMAtriptan (IMITREX) 50 MG Tab Take 1 Tab by mouth Once PRN for Migraine. 12 Tab 6   •  "SUMAtriptan Succinate 4 MG/0.5ML Solution Auto-injector Inject 1 Application as instructed as needed. 0.5 mL 6   • ondansetron (ZOFRAN ODT) 4 MG TABLET DISPERSIBLE Take 1 Tab by mouth every four hours as needed (give PO if no IV route available). 30 Tab 0     No current facility-administered medications for this visit.      He  has a past medical history of Allergy; Anxiety; Diabetes (CMS-[facility]); Head ache; Hypertension; and Migraines.    ROS as stated in hpi  No chest pain, no shortness of breath, no abdominal pain       Objective:     Blood pressure 100/66, pulse 97, temperature 36.7 °C (98.1 °F), resp. rate 14, height 1.854 m (6' 1\"), weight 87.5 kg (193 lb), SpO2 96 %. Body mass index is 25.46 kg/m².   Physical Exam:  Constitutional: Alert, no distress.  Skin: Warm, dry, good turgor,no cyanosis, no rashes in visible areas.  Eye: Equal, round and reactive, conjunctiva clear, lids normal.  Ears: No tenderness, no discharge.  External canals are without any significant edema or erythema.  .  Gross auditory acuity is intact.  Nose: symmetrical without tenderness, no discharge.  Mouth/Throat: lips without lesion.  Oropharynx clear.    Neck: Trachea midline, no masses, no obvious thyroid enlargement.. No cervical or supraclavicular lymphadenopathy. Range of motion within normal limits.  Neuro: Cranial nerves 2-12 grossly intact.  No sensory deficit.  Respiratory: Unlabored respiratory effort, lungs clear to auscultation, no wheezes, no ronchi.  Cardiovascular: Normal S1, S2, no murmur, no edema.  Psych: Alert and oriented x3, normal affect and mood and judgement.        Assessment and Plan:   The following treatment plan was discussed    1. Intractable cluster headache syndrome, unspecified chronicity pattern  Chronic. Ongoing. Improving some.  Wants to go back to work.  Awaiting release from neurologist.   Monitor.     2. Uncontrolled type 2 diabetes mellitus with diabetic neuropathy, without long-term current use of " insulin (HCC)  Chronic. Ongoing.  Improving with new medications.  Followed by endocrinology at this point.  Return in 2 months.  Monitor and follow.     Disability paper work completed and faxed.       Followup: Return in about 2 months (around 6/16/2018) for headaches.

## 2018-04-16 NOTE — ASSESSMENT & PLAN NOTE
Seeing Endocrinology.  Started on Janumet and an injectable.  Has follow up in 2 weeks.  Blood sugars are improving.

## 2018-04-16 NOTE — LETTER
April 16, 2018        Ivan Prescott  [address]  [place] [address]        Current Outpatient Prescriptions   Medication Sig Dispense Refill   • Dapagliflozin-Metformin HCl ER (XIGDUO XR) 5-1000 MG TABLET SR 24 HR Take  by mouth.     • Semaglutide (OZEMPIC) 0.25 or 0.5 MG/DOSE Solution Pen-injector Inject  as instructed.     • divalproex ER (DEPAKOTE ER) 500 MG TABLET SR 24 HR Take 1 Tab by mouth 2 Times a Day. (Patient taking differently: Take 1,000 mg by mouth 2 Times a Day.) 60 Tab 3   • gabapentin (NEURONTIN) 600 MG tablet Take 2 Tabs by mouth 3 times a day. 180 Tab 6   • Lancets Misc Lancets order: Lancets for Abbott Precision xtra meter. Sig: use am and prn ssx high or low sugar. 100 Each 3   • Blood Glucose Monitoring Suppl Supplies Misc Test strips order: Test strips for Abbott Precision xtra meter. Sig: use am and prn ssx high or low sugar 100 Each 3   • Blood Glucose Monitoring Suppl Device Meter: Dispense Abbott Precision xtra meter. Sig. Use as directed for blood sugar monitoring. 1 Device 1   • SUMAtriptan (IMITREX) 50 MG Tab Take 1 Tab by mouth Once PRN for Migraine. 12 Tab 6   • SUMAtriptan Succinate 4 MG/0.5ML Solution Auto-injector Inject 1 Application as instructed as needed. 0.5 mL 6   • ondansetron (ZOFRAN ODT) 4 MG TABLET DISPERSIBLE Take 1 Tab by mouth every four hours as needed (give PO if no IV route available). 30 Tab 0   • metformin (GLUCOPHAGE) 1000 MG tablet TAKE ONE TABLET (1000 MG) BY MOUTH TWICE DAILY WITH MEALS 180 Tab 3     No current facility-administered medications for this visit.

## 2018-04-17 ENCOUNTER — PATIENT MESSAGE (OUTPATIENT)
Dept: NEUROLOGY | Facility: MEDICAL CENTER | Age: 43
End: 2018-04-17

## 2018-04-19 NOTE — TELEPHONE ENCOUNTER
From: Ivan Prescott  To: Kalen Kimball M.D.  Sent: 4/17/2018 11:05 AM PDT  Subject: Non-Urgent Medical Question    Need to know results of blood work from 2 weeks ago, potential release back to work. Have questions please call

## 2018-05-02 ENCOUNTER — OFFICE VISIT (OUTPATIENT)
Dept: NEUROLOGY | Facility: MEDICAL CENTER | Age: 43
End: 2018-05-02
Payer: COMMERCIAL

## 2018-05-02 VITALS
WEIGHT: 191.47 LBS | TEMPERATURE: 98.6 F | HEART RATE: 100 BPM | RESPIRATION RATE: 16 BRPM | BODY MASS INDEX: 25.38 KG/M2 | HEIGHT: 73 IN | DIASTOLIC BLOOD PRESSURE: 60 MMHG | SYSTOLIC BLOOD PRESSURE: 98 MMHG | OXYGEN SATURATION: 97 %

## 2018-05-02 DIAGNOSIS — R56.9 SEIZURE (HCC): ICD-10-CM

## 2018-05-02 PROCEDURE — 99214 OFFICE O/P EST MOD 30 MIN: CPT | Performed by: PSYCHIATRY & NEUROLOGY

## 2018-05-02 RX ORDER — TOPIRAMATE 50 MG/1
50 TABLET, FILM COATED ORAL
Qty: 30 TAB | Refills: 4 | Status: SHIPPED | OUTPATIENT
Start: 2018-05-02 | End: 2018-05-02

## 2018-05-02 NOTE — PATIENT INSTRUCTIONS
IMPRESSION:    1. Newly onset of seizure Jan  2. Worsening migraine, headache since Nov 2017-- every day  3. Hx of migraine  4. Newly diagnosed as of Diabetic Mellitus 2017  5. Diabetic Mellitus neuropathy-- under gabapentin  6. Vit D deficency    PLAN/RECOMMENDATIONS:    ________________________________________________________________________      It is fine to release the patient to go back to work--   Please write a letter stating that the patient is fine to go back to work---   We also discuss about the FMLA-- like one day per week  ________________________________________________________________________    Due to Vit D deficiency, please take vit D-3   4000unit    daily         It is reasonable to have cardiac monitoring-- although the Hx is more consistent with seizure( post-ictal confusion)  Even the patient's bad headache might be seizures too( because of post-ictal agitation and mild confusion)      ________________________________________________________________________      Continue Depakote ER 1000mg two times per day daily ---  Please contact the  regarding Occipital nerve block and Botox   May consider spinal tap soon  ________________________________________________________________________        We told the patient not to drive 3 months after any spell of passing out      Results for JORGITO VALIENTE (MRN [id number]) as of 5/2/2018 09:54   Ref. Range 4/4/2018 10:17   Valproic Acid Latest Ref Range: 50.0 - 100.0 ug/mL 33.3 (L)   25-Hydroxy   Vitamin D 25 Latest Ref Range: 30 - 100 ng/mL 19 (L)         SIGNATURE:  Kalen Kimball      CC:  NHAN Oshea

## 2018-05-02 NOTE — PROGRESS NOTES
NEUROLOGY NOTE    Referring Physician  NHAN Oshea    CHIEF COMPLAINT:  Newly onset seizure and bad headache Nov 2017-- one seizure Jan 2018  Chief Complaint   Patient presents with   • Follow-Up     Vitamin deficiency       PRESENT ILLNESS:     Migraine -- the patient used to have    Since Nov 2017--- developed new headache, started having bad headache  Jan 24 th 2018--- developed one seizure  Was found to have have diabetic mellitus March 2017      Headache worse since Nov 2017  1. Location--left frontal   2. Characteristics-- ice-picking like, throbbing  3. Associated--light , noise  4. Worsening-- temperature, humidity, weather change, period  5. Relieving factors--   Rescue medicine imitrex  6. Family History-not she could recall  7. Every day- all day long   8. Severity-- can not work   9. Sleep-- no problem  10. Coffee intake-- not coffee addict    The patient is able to describe 3 kinds of headache  1. The worse one--- temporal to frontal-- lasting for half hour-- came out    2. Mediocre  3. mild    Since   This is a 42-year-old male with chronic headaches   apparently, but more so and severe over the last 3 weeks.  Daily headaches   are reported.  He reports that those feel like migraine headaches.  He was   never officially diagnosed.  He knows the difference between tension headaches   and migraine headaches.  Per his report, he is sensitive to light.  Today, he   woke up, went to work, had a headache and suddenly apparently had a fall and   shaking spell.  He woke up in the ambulance.  Per report, he had a witnessed   seizure there, but there is no specific report from coworkers.  Patient in the   field had a blood sugar of 359.  The patient was brought to the emergency   room, is seen by the ER physician who did a workup including a CAT scan and   laboratory workup, which was so far negative except for abnormal lactic acid.    The patient then per report in the ER had another witnessed  seizure spell and   the patient at this time has a severe headache.  There was no postictal state   here in the ER.  The patient does not remember the occurrence at work.    Again, he remembers only being at work at his workplace and then waking up in   the ambulance.  The patient never had seizures before.  There is no prior loss   of consciousness or other trauma associated.  The patient denies history of   head injury.  The patient at this time complains of light sensitivity and   ongoing headaches, currently frontal starting on the right, now bilateral   frontal lobes.  He a lot of times has posterior headaches.  He does have an   aura he usually knows when his headache is coming on, a lot of times it helps   when he lays in a dark room and is quiet.  The patient at this time is   otherwise accompanied by his family here giving some additional history.    PAST MEDICAL HISTORY:  Past Medical History:   Diagnosis Date   • Allergy    • Anxiety    • Diabetes (HCC)    • Head ache    • Hypertension    • Migraines        PAST SURGICAL HISTORY:  Past Surgical History:   Procedure Laterality Date   • EYE SURGERY     • KNEE ARTHROTOMY      plica removal 2016       FAMILY HISTORY:  Family History   Problem Relation Age of Onset   • No Known Problems Mother    • No Known Problems Father    • No Known Problems Sister    • Diabetes Paternal Grandmother        SOCIAL HISTORY:  Social History     Social History   • Marital status:      Spouse name: N/A   • Number of children: N/A   • Years of education: N/A     Occupational History   • Not on file.     Social History Main Topics   • Smoking status: Never Smoker   • Smokeless tobacco: Current User     Types: Chew   • Alcohol use No   • Drug use: No   • Sexual activity: Yes     Partners: Female     Other Topics Concern   • Not on file     Social History Narrative   • No narrative on file     ALLERGIES:  Allergies   Allergen Reactions   • Acetaminophen Swelling     Face  "swells up for 4 hours     TOBHX  History   Smoking Status   • Never Smoker   Smokeless Tobacco   • Current User   • Types: Chew     ALCHX  History   Alcohol Use No     DRUGHX  History   Drug Use No           MEDICATIONS:  Current Outpatient Prescriptions   Medication   • Dapagliflozin-Metformin HCl ER (XIGDUO XR) 5-1000 MG TABLET SR 24 HR   • divalproex ER (DEPAKOTE ER) 500 MG TABLET SR 24 HR   • gabapentin (NEURONTIN) 600 MG tablet   • Semaglutide (OZEMPIC) 0.25 or 0.5 MG/DOSE Solution Pen-injector   • Lancets Misc   • Blood Glucose Monitoring Suppl Supplies Misc   • Blood Glucose Monitoring Suppl Device   • SUMAtriptan (IMITREX) 50 MG Tab   • SUMAtriptan Succinate 4 MG/0.5ML Solution Auto-injector   • ondansetron (ZOFRAN ODT) 4 MG TABLET DISPERSIBLE     No current facility-administered medications for this visit.        REVIEW OF SYSTEM:    Constitutional: Denies fevers, Denies weight changes   Eyes: Denies changes in vision, no eye pain   Ears/Nose/Throat/Mouth: Denies nasal congestion or sore throat   Cardiovascular: Denies chest pain or palpitations   Respiratory: Denies SOB.   Gastrointestinal/Hepatic: Denies abdominal pain, nausea, vomiting, diarrhea, constipation or GI bleeding   Genitourinary: Denies bladder dysfunction, dysuria or frequency   Musculoskeletal/Rheum: Denies joint pain and swelling   Skin/Breast: Denies rash, denies breast lumps or discharge   Neurological: migraine, headache  Psychiatric: denies mood disorder   Endocrine: denies hx of diabetes or thyroid dysfunction   Heme/Oncology/Lymph Nodes: Denies enlarged lymph nodes, denies brusing or known bleeding disorder   Allergic/Immunologic: Denies hx of allergies         PHYSICAL AND NEUROLOGICAL EXMAINATIONS:  VITAL SIGNS: BP (!) 98/60   Pulse 100   Temp 37 °C (98.6 °F)   Resp 16   Ht 1.854 m (6' 1\")   Wt 86.8 kg (191 lb 7.5 oz)   SpO2 97%   BMI 25.26 kg/m²   CURRENT WEIGHT: BMI: Body mass index is 25.26 kg/m².  PREVIOUS WEIGHTS:  Wt " Readings from Last 25 Encounters:   05/02/18 86.8 kg (191 lb 7.5 oz)   04/16/18 87.5 kg (193 lb)   04/12/18 87 kg (191 lb 12.8 oz)   04/03/18 87.2 kg (192 lb 5.6 oz)   03/15/18 86.6 kg (191 lb)   01/31/18 83.5 kg (184 lb)   01/24/18 86.2 kg (190 lb)   01/18/18 85.7 kg (189 lb)   10/24/17 85.3 kg (188 lb)   05/26/17 84.8 kg (187 lb)   05/15/17 84.8 kg (187 lb)   04/19/17 87.1 kg (192 lb)   03/22/17 85.7 kg (189 lb)   01/03/13 95.3 kg (210 lb)       General appearance of patient: WDWN(+) NAD(+)    EYES  o Fundus : Papilledem(-) Exudates(-) Hemorrhage(-)  Nervous System  Orientation to time, place and person(+)  Memory normal(+)  Language: aphasia(-)  Knowledge: past(+) Current(+)  Attention(+)  Cranial Nerves  • Nerve 2: intact  • Nerve 3,4,6: intact  • Nerve 5 : intact  • Nerve 7: intact  • Nerve 8: intact  • Nerve 9 & 10: intact  • Nerve 11: intact  • Nerve 12: intact  Muscle Power and muscle tone: symmetric, normal in upper and lower  Sensory System: Pin sensation intact(+)  Reflexes: symmetric throughout  Cerebellar Function FNP normal   Gait : Steady(+) TandemGait steady(+)  Heart and Vascular  Peripheral Vasucular system : Edema (-) Swelling(-)  RHB, Breathing sound clear  abdomen bowel sound normoactive  Extremities freely moveable  Joints no contracture       NEUROIMAGING: I reviewed the MRI/CT of brain       LAB:      Explain to the patient that the worsening of headache since Nov 2017 could be secondary to one of the following     1. Stressful like-- could worsen the underlined migraine ( Advil 30# per month, allergy to ASA)   2. Inflammation ( autoimmune), Infection ( fungus, chronic infection), neoplastic ( unlikely) etc    We will offer 2 days 24 hour ambulatory EEG ( one routine EEG might not be powerful enough to record abnormality)  We will need offer the patient blood tests too, we might need to offer spinal tap in the future    Tried Topamax, could not tolerate Topamax  Took Gabapentin for painful  neuropathy    IMPRESSION:    1. Newly onset of seizure Jan  2. Worsening migraine,Daily headache since Nov 2017-- every day  3. Hx of migraine  4. Newly diagnosed as of Diabetic Mellitus 2017  5. Diabetic Mellitus neuropathy-- under gabapentin  6. Vit D deficency    PLAN/RECOMMENDATIONS:    It is fine to release the patient to go back to work--   Please write a letter stating that the patient is fine to go back to work---   We also discuss about the FMLA-- like one day per week    It is reasonable to have cardiac monitoring-- although the Hx is more consistent with seizure( post-ictal confusion)  Even the patient's bad headache might be seizures too( because of post-ictal agitation and mild confusion)        Continue Depakote ER 1000mg two times per day daily ---  Please contact the  regarding Occipital nerve block and Botox       We told the patient not to drive 3 months after any spell of passing out      Results for STEFFANIE JORGITO N (MRN [id number]) as of 5/2/2018 09:54   Ref. Range 4/4/2018 10:17   Valproic Acid Latest Ref Range: 50.0 - 100.0 ug/mL 33.3 (L)   25-Hydroxy   Vitamin D 25 Latest Ref Range: 30 - 100 ng/mL 19 (L)         SIGNATURE:  Kalen Kimball      CC:  NHAN Oshea    1/24/2018  3. INCIDENTALLY NOTED ORIF RIGHT ORBITAL FLOOR BLOWOUT FRACTURE WITH MESH REPAIR.        1/24/2018  INTERPRETATION: EEG by Dr Lau  This is an abnormal video EEG recording in the awake and drowsy state(s), due to excessive fast background activity due to the use of sedatives. There were events captured during and in between photic stimulation, which were NOT epileptic in nature. No seizures captured. Clinical correlation is recommended.     Note:This EEG does not rule out epilepsy.  If the clinical suspicion remains high for seizures, a prolonged recording to capture clinical or subclinical events may be helpful.

## 2018-05-03 ENCOUNTER — OFFICE VISIT (OUTPATIENT)
Dept: ENDOCRINOLOGY | Facility: MEDICAL CENTER | Age: 43
End: 2018-05-03
Payer: COMMERCIAL

## 2018-05-03 VITALS
OXYGEN SATURATION: 96 % | HEART RATE: 98 BPM | HEIGHT: 73 IN | DIASTOLIC BLOOD PRESSURE: 62 MMHG | WEIGHT: 191.8 LBS | BODY MASS INDEX: 25.42 KG/M2 | SYSTOLIC BLOOD PRESSURE: 118 MMHG

## 2018-05-03 DIAGNOSIS — I10 ESSENTIAL HYPERTENSION: ICD-10-CM

## 2018-05-03 PROCEDURE — 99214 OFFICE O/P EST MOD 30 MIN: CPT | Performed by: PHYSICIAN ASSISTANT

## 2018-05-03 RX ORDER — DAPAGLIFLOZIN AND METFORMIN HYDROCHLORIDE 10; 1000 MG/1; MG/1
1 TABLET, FILM COATED, EXTENDED RELEASE ORAL EVERY MORNING
Qty: 30 TAB | Refills: 11 | Status: SHIPPED | OUTPATIENT
Start: 2018-05-03 | End: 2018-08-13 | Stop reason: SDUPTHER

## 2018-05-03 RX ORDER — PIOGLITAZONEHYDROCHLORIDE 30 MG/1
30 TABLET ORAL DAILY
Qty: 30 TAB | Refills: 11 | Status: SHIPPED | OUTPATIENT
Start: 2018-05-03 | End: 2018-09-07 | Stop reason: SDUPTHER

## 2018-05-03 RX ORDER — DAPAGLIFLOZIN AND METFORMIN HYDROCHLORIDE 5; 1000 MG/1; MG/1
1 TABLET, FILM COATED, EXTENDED RELEASE ORAL 2 TIMES DAILY
Qty: 60 TAB | Refills: 11 | COMMUNITY
Start: 2018-05-03 | End: 2018-05-03

## 2018-05-03 NOTE — PATIENT INSTRUCTIONS
Now on:  1.  Xigduo 10/1000 one in the AM   2.  Ozempic 0.25 once a week (INCREASE to 0.5 on 5/11/18 )  3.  Actos 30mg once a day ( at the pharmacy)      Blood glucose log: Check BG in the morning when wake up,  and before bed.  So two times a day.  Always bring BG diary to the next office visit.

## 2018-05-03 NOTE — PROGRESS NOTES
Return to office Patient Consult Note  Referred by: NHAN Oshea    Reason for consult: Diabetes Management Type 2    HPI:  Ivan Prescott is a 42 y.o. old patient who is seeing us today for diabetes care.  This is a pleasant patient with diabetes and I appreciate the opportunity to participate in the care of this patient.    BG Diary:5/3/2018  Before Bed: 220, 175, 225, 218, 210, 181, 183, 173, 224    Labs of 3/15/18 HbA1c is 10.5, GFR >60  Labs of 4/12/18 HbA1c was 12     BG Diary:4/12/2018  In the AM: did not bring  States in the -400      Weight: on 5/3/18 he is 191 on 4/12/18 he was 191    1. Uncontrolled type 2 diabetes mellitus with diabetic neuropathy, without long-term current use of insulin ([facility])    This is a new patient with me on 4/12/18  He was on:  1.   Metformin 1000mg     STOP:  Metformin     Start   1.  Xigduo 5/1000 one in the AM one in the PM  2.  Ozempic 0.25 once a week    2. Essential hypertension  This is stable        ROS:   Constitutional: No night sweats.  Eyes:  No visual changes.  Cardiac: No chest pain, No palpitations or racing heart rate.  Resp: No shortness of breath, No cough,   Gi: No Diarrhea    All other systems were reviewed and were/are negative.  The ROS was revised/revisited during this office visit from the patients first office visit with me on 4/12/18Please review the full ROS during the first office visit.    Past Medical History:  Patient Active Problem List    Diagnosis Date Noted   • Headache 01/25/2018     Priority: High   • Nausea and vomiting 01/25/2018     Priority: High   • DM (diabetes mellitus) ([facility]) 01/24/2018     Priority: Medium   • Essential hypertension 04/19/2017     Priority: Medium   • Vitamin deficiency 04/03/2018   • Neuropathy ([facility]) 04/03/2018   • Seizure cerebral 04/03/2018   • Restlessness and agitation 03/15/2018   • Blurry vision, bilateral 03/15/2018   • Uncontrolled type 2 diabetes mellitus with diabetic neuropathy, without  long-term current use of insulin (HCC) 04/19/2017   • Pain in both feet 03/22/2017   • Erectile dysfunction 03/22/2017   • Chest skin lesion 03/22/2017   • Hx of migraines 03/22/2017       Past Surgical History:  Past Surgical History:   Procedure Laterality Date   • EYE SURGERY     • KNEE ARTHROTOMY      plica removal 2016       Allergies:  Acetaminophen    Social History:  Social History     Social History   • Marital status:      Spouse name: N/A   • Number of children: N/A   • Years of education: N/A     Occupational History   • Not on file.     Social History Main Topics   • Smoking status: Never Smoker   • Smokeless tobacco: Current User     Types: Chew   • Alcohol use No   • Drug use: No   • Sexual activity: Yes     Partners: Female     Other Topics Concern   • Not on file     Social History Narrative   • No narrative on file       Family History:  Family History   Problem Relation Age of Onset   • No Known Problems Mother    • No Known Problems Father    • No Known Problems Sister    • Diabetes Paternal Grandmother        Medications:    Current Outpatient Prescriptions:   •  Semaglutide (OZEMPIC) 1 MG/DOSE Solution Pen-injector, Inject 1 mg as instructed every 7 days., Disp: 1 PEN, Rfl: 11  •  pioglitazone (ACTOS) 30 MG Tab, Take 1 Tab by mouth every day., Disp: 30 Tab, Rfl: 11  •  Dapagliflozin-Metformin HCl ER (XIGDUO XR)  MG TABLET SR 24 HR, Take 1 Tab by mouth every morning., Disp: 30 Tab, Rfl: 11  •  divalproex ER (DEPAKOTE ER) 500 MG TABLET SR 24 HR, Take 1 Tab by mouth 2 Times a Day. (Patient taking differently: Take 1,000 mg by mouth 2 Times a Day.), Disp: 60 Tab, Rfl: 3  •  gabapentin (NEURONTIN) 600 MG tablet, Take 2 Tabs by mouth 3 times a day., Disp: 180 Tab, Rfl: 6  •  Lancets Misc, Lancets order: Lancets for Abbott Precision xtra meter. Sig: use am and prn ssx high or low sugar., Disp: 100 Each, Rfl: 3  •  Blood Glucose Monitoring Suppl Supplies Misc, Test strips order: Test  "strips for Abbott Precision xtra meter. Sig: use am and prn ssx high or low sugar, Disp: 100 Each, Rfl: 3  •  Blood Glucose Monitoring Suppl Device, Meter: Dispense Abbott Precision xtra meter. Sig. Use as directed for blood sugar monitoring., Disp: 1 Device, Rfl: 1  •  SUMAtriptan (IMITREX) 50 MG Tab, Take 1 Tab by mouth Once PRN for Migraine., Disp: 12 Tab, Rfl: 6  •  SUMAtriptan Succinate 4 MG/0.5ML Solution Auto-injector, Inject 1 Application as instructed as needed., Disp: 0.5 mL, Rfl: 6  •  ondansetron (ZOFRAN ODT) 4 MG TABLET DISPERSIBLE, Take 1 Tab by mouth every four hours as needed (give PO if no IV route available)., Disp: 30 Tab, Rfl: 0        Physical Examination:   Vital signs: /62   Pulse 98   Ht 1.854 m (6' 1\")   Wt 87 kg (191 lb 12.8 oz)   SpO2 96%   BMI 25.30 kg/m²   General: No distress, cooperative, well dressed and well nourished.   Eyes: No scleral icterus or discharge, No hyposphagma  ENMT: Normal on external inspection of nose, lips, No nasal drainage   Neck: No abnormal masses on inspection  Resp: Normal effort, Bilateral clear to auscultation, No wheezing, No rales  CVS: Regular rate and rhythm, S1 S2 normal, No murmur. No gallop  Extremities: No edema bilateral extremities  Neuro: Alert and oriented  Skin: No rash, No Ulcers  Psych: Normal mood and affect      Assessment and Plan:    1. Uncontrolled type 2 diabetes mellitus with diabetic neuropathy, without long-term current use of insulin (HCC)    Now on:  1.  Xigduo 10/1000 one in the AM   2.  Ozempic 0.25 once a week (INCREASE to 0.5 on 5/11/18 )  3.  Actos 30mg once a day ( at the pharmacy)    2. Essential hypertension  This is stable    Return in about 3 weeks (around 5/24/2018).    Blood glucose log: Check BG in the morning when wake up, before lunch or dinner and before bed.  So three times a day.  Always bring BG diary to the next office visit.     Thank you kindly for allowing me to participate in the diabetes " care plan for this patient.    Alton Morales PA-C, [facility]  Board Certified - Advanced Diabetes Management  05/03/18    CC:   NHAN Oshea

## 2018-05-08 ENCOUNTER — TELEPHONE (OUTPATIENT)
Dept: NEUROLOGY | Facility: MEDICAL CENTER | Age: 43
End: 2018-05-08

## 2018-05-08 NOTE — TELEPHONE ENCOUNTER
Due to Vit D deficiency, please take vit D-3   4000unit    daily    Spoke to patient gave above results and confirmed his EEG dates and times of 9:00 all three days per Najma.

## 2018-05-08 NOTE — TELEPHONE ENCOUNTER
Results for JORGITO VALIENTE (MRN [id number]) as of 5/8/2018 14:26   Ref. Range 4/4/2018 10:17   25-Hydroxy   Vitamin D 25 Latest Ref Range: 30 - 100 ng/mL 19 (L)       Due to Vit D deficiency, please take vit D-3   4000unit    daily

## 2018-05-09 DIAGNOSIS — G43.709 CHRONIC MIGRAINE W/O AURA W/O STATUS MIGRAINOSUS, NOT INTRACTABLE: ICD-10-CM

## 2018-05-11 ENCOUNTER — TELEPHONE (OUTPATIENT)
Dept: ENDOCRINOLOGY | Facility: MEDICAL CENTER | Age: 43
End: 2018-05-11

## 2018-05-18 ENCOUNTER — PATIENT MESSAGE (OUTPATIENT)
Dept: NEUROLOGY | Facility: MEDICAL CENTER | Age: 43
End: 2018-05-18

## 2018-05-22 NOTE — TELEPHONE ENCOUNTER
From: Ivan Prescott  To: Kalen Kimball M.D.  Sent: 5/18/2018 1:46 PM PDT  Subject: Non-Urgent Medical Question     What are my days in for my eeg test and what time do I need to be there?

## 2018-06-11 ENCOUNTER — OFFICE VISIT (OUTPATIENT)
Dept: MEDICAL GROUP | Facility: PHYSICIAN GROUP | Age: 43
End: 2018-06-11
Payer: COMMERCIAL

## 2018-06-11 VITALS
HEART RATE: 107 BPM | OXYGEN SATURATION: 98 % | HEIGHT: 73 IN | SYSTOLIC BLOOD PRESSURE: 102 MMHG | DIASTOLIC BLOOD PRESSURE: 72 MMHG | BODY MASS INDEX: 25.84 KG/M2 | RESPIRATION RATE: 14 BRPM | TEMPERATURE: 98.4 F | WEIGHT: 195 LBS

## 2018-06-11 DIAGNOSIS — G40.909 SEIZURE CEREBRAL (HCC): ICD-10-CM

## 2018-06-11 DIAGNOSIS — Z86.69 HX OF MIGRAINES: ICD-10-CM

## 2018-06-11 DIAGNOSIS — E11.8 TYPE 2 DIABETES MELLITUS WITH COMPLICATION, WITHOUT LONG-TERM CURRENT USE OF INSULIN (HCC): ICD-10-CM

## 2018-06-11 DIAGNOSIS — G44.001 INTRACTABLE CLUSTER HEADACHE SYNDROME, UNSPECIFIED CHRONICITY PATTERN: ICD-10-CM

## 2018-06-11 DIAGNOSIS — I10 ESSENTIAL HYPERTENSION: ICD-10-CM

## 2018-06-11 PROBLEM — R11.2 NAUSEA AND VOMITING: Status: RESOLVED | Noted: 2018-01-25 | Resolved: 2018-06-11

## 2018-06-11 PROCEDURE — 99214 OFFICE O/P EST MOD 30 MIN: CPT | Performed by: NURSE PRACTITIONER

## 2018-06-11 NOTE — ASSESSMENT & PLAN NOTE
"Continues to have daily headaches.  Does not have them in the morning but as the day progresses, they occur.   Has been approved for botox injections.  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 Patient states that his headaches are caused from 3 lymph nodes in the back of his neck and top of his head.  Feels these \"knots\" at night.    "

## 2018-06-11 NOTE — ASSESSMENT & PLAN NOTE
Seeing Alton at endocrinology for control. Patient is annoyed with the unresponsiveness of the office. Has appointment tomorrow.

## 2018-06-12 ENCOUNTER — OFFICE VISIT (OUTPATIENT)
Dept: ENDOCRINOLOGY | Facility: MEDICAL CENTER | Age: 43
End: 2018-06-12
Payer: COMMERCIAL

## 2018-06-12 ENCOUNTER — HOSPITAL ENCOUNTER (OUTPATIENT)
Dept: LAB | Facility: MEDICAL CENTER | Age: 43
End: 2018-06-12
Attending: NURSE PRACTITIONER
Payer: COMMERCIAL

## 2018-06-12 VITALS
OXYGEN SATURATION: 95 % | SYSTOLIC BLOOD PRESSURE: 118 MMHG | DIASTOLIC BLOOD PRESSURE: 72 MMHG | WEIGHT: 195 LBS | HEIGHT: 73 IN | BODY MASS INDEX: 25.84 KG/M2 | HEART RATE: 125 BPM

## 2018-06-12 DIAGNOSIS — E11.42 TYPE 2 DIABETES MELLITUS WITH DIABETIC POLYNEUROPATHY, WITHOUT LONG-TERM CURRENT USE OF INSULIN (HCC): ICD-10-CM

## 2018-06-12 DIAGNOSIS — E11.8 TYPE 2 DIABETES MELLITUS WITH COMPLICATION, WITHOUT LONG-TERM CURRENT USE OF INSULIN (HCC): ICD-10-CM

## 2018-06-12 DIAGNOSIS — I10 ESSENTIAL HYPERTENSION: ICD-10-CM

## 2018-06-12 LAB
ALBUMIN SERPL BCP-MCNC: 3.8 G/DL (ref 3.2–4.9)
ALBUMIN/GLOB SERPL: 1.2 G/DL
ALP SERPL-CCNC: 55 U/L (ref 30–99)
ALT SERPL-CCNC: 38 U/L (ref 2–50)
ANION GAP SERPL CALC-SCNC: 10 MMOL/L (ref 0–11.9)
AST SERPL-CCNC: 18 U/L (ref 12–45)
BILIRUB SERPL-MCNC: 0.5 MG/DL (ref 0.1–1.5)
BUN SERPL-MCNC: 23 MG/DL (ref 8–22)
CALCIUM SERPL-MCNC: 8.7 MG/DL (ref 8.5–10.5)
CHLORIDE SERPL-SCNC: 107 MMOL/L (ref 96–112)
CO2 SERPL-SCNC: 23 MMOL/L (ref 20–33)
CREAT SERPL-MCNC: 1.07 MG/DL (ref 0.5–1.4)
EST. AVERAGE GLUCOSE BLD GHB EST-MCNC: 203 MG/DL
GLOBULIN SER CALC-MCNC: 3.3 G/DL (ref 1.9–3.5)
GLUCOSE SERPL-MCNC: 143 MG/DL (ref 65–99)
HBA1C MFR BLD: 8.7 % (ref 0–5.6)
POTASSIUM SERPL-SCNC: 3.8 MMOL/L (ref 3.6–5.5)
PROT SERPL-MCNC: 7.1 G/DL (ref 6–8.2)
SODIUM SERPL-SCNC: 140 MMOL/L (ref 135–145)

## 2018-06-12 PROCEDURE — 80053 COMPREHEN METABOLIC PANEL: CPT

## 2018-06-12 PROCEDURE — 83036 HEMOGLOBIN GLYCOSYLATED A1C: CPT

## 2018-06-12 PROCEDURE — 99214 OFFICE O/P EST MOD 30 MIN: CPT | Performed by: PHYSICIAN ASSISTANT

## 2018-06-12 PROCEDURE — 36415 COLL VENOUS BLD VENIPUNCTURE: CPT

## 2018-06-12 NOTE — PATIENT INSTRUCTIONS
Now on:  1.  Xigduo 10/1000 one in the AM (Change to 10/500 once a day)  2.  Ozempic 0.5 once a week (INCREASE to 1.0)  3.  Actos 30mg once a day   4.  Start Tresiba 10 units at night before bed

## 2018-06-12 NOTE — PROGRESS NOTES
Return to office Patient Consult Note  Referred by: NHAN Oshea    Reason for consult: Diabetes Management Type 2    HPI:  Ivan Prescott is a 42 y.o. old patient who is seeing us today for diabetes care.  This is a pleasant patient with diabetes and I appreciate the opportunity to participate in the care of this patient.    BG Diary:6/12/2018  In the AM: 195, 200, 195, 147, 143, 238, 185, 270  Before Bed: 155, 190, 200, 185, 190, 235, 210     BG Diary:5/3/2018  Before Bed: 220, 175, 225, 218, 210, 181, 183, 173, 224     Labs of 3/15/18 HbA1c is 10.5, GFR >60  Labs of 4/12/18 HbA1c was 12     BG Diary:4/12/2018  In the AM: did not bring  States in the -400      Weight: on 5/3/18 he is 191 on 4/12/18 he was 191      1. Uncontrolled type 2 diabetes mellitus with diabetic neuropathy, without long-term current use of insulin ([facility])    This is a new patient with me on 4/12/18  He was on:  1.   Metformin 1000mg     STOP:  Metformin     Now on:  1.  Xigduo 10/1000 one in the AM   2.  Ozempic 0.5 once a week   3.  Actos 30mg once a day      2. Essential hypertension  This is stable           ROS:   Constitutional: No night sweats.  Eyes:  No visual changes.  Cardiac: No chest pain, No palpitations or racing heart rate.  Resp: No shortness of breath, No cough,   Gi: No Diarrhea    All other systems were reviewed and were/are negative.  The ROS was revised/revisited during this office visit from the patients first office visit with me on 4/12/18 Please review the full ROS during the first office visit.    Past Medical History:  Patient Active Problem List    Diagnosis Date Noted   • Headache 01/25/2018     Priority: High   • DM (diabetes mellitus) ([facility]) 01/24/2018     Priority: Medium   • Essential hypertension 04/19/2017     Priority: Medium   • Vitamin deficiency 04/03/2018   • Neuropathy ([facility]) 04/03/2018   • Seizure cerebral 04/03/2018   • Restlessness and agitation 03/15/2018   • Blurry vision,  bilateral 03/15/2018   • Uncontrolled type 2 diabetes mellitus with diabetic neuropathy, without long-term current use of insulin (HCC) 04/19/2017   • Pain in both feet 03/22/2017   • Erectile dysfunction 03/22/2017   • Chest skin lesion 03/22/2017   • Hx of migraines 03/22/2017       Past Surgical History:  Past Surgical History:   Procedure Laterality Date   • EYE SURGERY     • KNEE ARTHROTOMY      plica removal 2016       Allergies:  Acetaminophen    Social History:  Social History     Social History   • Marital status:      Spouse name: N/A   • Number of children: N/A   • Years of education: N/A     Occupational History   • Not on file.     Social History Main Topics   • Smoking status: Never Smoker   • Smokeless tobacco: Current User     Types: Chew   • Alcohol use No   • Drug use: No   • Sexual activity: Yes     Partners: Female     Other Topics Concern   • Not on file     Social History Narrative   • No narrative on file       Family History:  Family History   Problem Relation Age of Onset   • No Known Problems Mother    • No Known Problems Father    • No Known Problems Sister    • Diabetes Paternal Grandmother        Medications:    Current Outpatient Prescriptions:   •  Semaglutide (OZEMPIC) 1 MG/DOSE Solution Pen-injector, Inject 1 mg as instructed every 7 days., Disp: 1 PEN, Rfl: 11  •  pioglitazone (ACTOS) 30 MG Tab, Take 1 Tab by mouth every day., Disp: 30 Tab, Rfl: 11  •  Dapagliflozin-Metformin HCl ER (XIGDUO XR)  MG TABLET SR 24 HR, Take 1 Tab by mouth every morning., Disp: 30 Tab, Rfl: 11  •  divalproex ER (DEPAKOTE ER) 500 MG TABLET SR 24 HR, Take 1 Tab by mouth 2 Times a Day. (Patient taking differently: Take 1,000 mg by mouth 2 Times a Day.), Disp: 60 Tab, Rfl: 3  •  gabapentin (NEURONTIN) 600 MG tablet, Take 2 Tabs by mouth 3 times a day., Disp: 180 Tab, Rfl: 6  •  Lancets Misc, Lancets order: Lancets for Abbott Precision xtra meter. Sig: use am and prn ssx high or low sugar.,  "Disp: 100 Each, Rfl: 3  •  Blood Glucose Monitoring Suppl Supplies Misc, Test strips order: Test strips for Abbott Precision xtra meter. Sig: use am and prn ssx high or low sugar, Disp: 100 Each, Rfl: 3  •  Blood Glucose Monitoring Suppl Device, Meter: Dispense Abbott Precision xtra meter. Sig. Use as directed for blood sugar monitoring., Disp: 1 Device, Rfl: 1  •  SUMAtriptan (IMITREX) 50 MG Tab, Take 1 Tab by mouth Once PRN for Migraine., Disp: 12 Tab, Rfl: 6  •  SUMAtriptan Succinate 4 MG/0.5ML Solution Auto-injector, Inject 1 Application as instructed as needed., Disp: 0.5 mL, Rfl: 6  •  ondansetron (ZOFRAN ODT) 4 MG TABLET DISPERSIBLE, Take 1 Tab by mouth every four hours as needed (give PO if no IV route available)., Disp: 30 Tab, Rfl: 0        Physical Examination:   Vital signs: /72   Pulse (!) 125   Ht 1.854 m (6' 0.99\")   Wt 88.5 kg (195 lb)   SpO2 95%   BMI 25.73 kg/m²   General: No distress, cooperative, well dressed and well nourished.   Eyes: No scleral icterus or discharge, No hyposphagma  ENMT: Normal on external inspection of nose, lips, No nasal drainage   Neck: No abnormal masses on inspection  Resp: Normal effort, Bilateral clear to auscultation, No wheezing, No rales  CVS: Regular rate and rhythm, S1 S2 normal, No murmur. No gallop  Extremities: No edema bilateral extremities  Neuro: Alert and oriented  Skin: No rash, No Ulcers  Psych: Normal mood and affect      Assessment and Plan:    1. Uncontrolled type 2 diabetes mellitus with diabetic neuropathy, without long-term current use of insulin (HCC)    Now on:  1.  Xigduo 10/1000 one in the AM (Change to 10/500 once a day)  2.  Ozempic 0.5 once a week (INCREASE to 1.0)  3.  Actos 30mg once a day   4.  Start Tresiba 10 units at night before bed    2. Essential hypertension  This is very stable and no changes needed    Return in about 2 weeks (around 6/26/2018).    Blood glucose log: Check BG in the morning when wake up, before lunch or " dinner and before bed.  So three times a day.  Always bring BG diary to the next office visit.       Thank you kindly for allowing me to participate in the diabetes care plan for this patient.    Alton Morales PA-C, BC-[facility]  Board Certified - Advanced Diabetes Management  06/12/18    CC:   NHAN Oshea

## 2018-06-12 NOTE — PROGRESS NOTES
"Chief Complaint   Patient presents with   • Follow-Up   • Headache   • Diabetes       Subjective:   Ivan Prescott is a 42 y.o. male here today for evaluation and management of:    Headache  Has 3 day EEG scheduled this week.  They are considering some botox for headaches.     Essential hypertension  bp 102/72 today.  Not currently taking anything.     DM (diabetes mellitus) (CMS-HCC) ([facility])  Seeing Alton at endocrinology for control. Patient is annoyed with the unresponsiveness of the office. Has appointment tomorrow.     Hx of migraines  Continues to have daily headaches.  Does not have them in the morning but as the day progresses, they occur.   Has been approved for botox injections.  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 Patient states that his headaches are caused from 3 lymph nodes in the back of his neck and top of his head.  Feels these \"knots\" at night.      Uncontrolled type 2 diabetes mellitus with diabetic neuropathy, without long-term current use of insulin ([facility])  Seeing endocrinology Taking ozempic, actos.  Did stop the metformin.     Erectile dysfunction  Reports improved sensation but still having difficulty with erection and ejaculation.     Seizure cerebral  No seizures reported.  Depakote daily.  Seeing Dr. Kimball.  Followup EEG tomorrow .            Current medicines (including changes today)  Current Outpatient Prescriptions   Medication Sig Dispense Refill   • Semaglutide (OZEMPIC) 1 MG/DOSE Solution Pen-injector Inject 1 mg as instructed every 7 days. 1 PEN 11   • pioglitazone (ACTOS) 30 MG Tab Take 1 Tab by mouth every day. 30 Tab 11   • Dapagliflozin-Metformin HCl ER (XIGDUO XR)  MG TABLET SR 24 HR Take 1 Tab by mouth every morning. 30 Tab 11   • divalproex ER (DEPAKOTE ER) 500 MG TABLET SR 24 HR Take 1 Tab by mouth 2 Times a Day. (Patient taking differently: Take 1,000 mg by mouth 2 Times a Day.) 60 Tab 3   • gabapentin (NEURONTIN) 600 MG tablet Take 2 Tabs by mouth 3 times a " "day. 180 Tab 6   • Lancets Misc Lancets order: Lancets for Abbott Precision xtra meter. Sig: use am and prn ssx high or low sugar. 100 Each 3   • Blood Glucose Monitoring Suppl Supplies Misc Test strips order: Test strips for Abbott Precision xtra meter. Sig: use am and prn ssx high or low sugar 100 Each 3   • Blood Glucose Monitoring Suppl Device Meter: Dispense Abbott Precision xtra meter. Sig. Use as directed for blood sugar monitoring. 1 Device 1   • SUMAtriptan (IMITREX) 50 MG Tab Take 1 Tab by mouth Once PRN for Migraine. 12 Tab 6   • SUMAtriptan Succinate 4 MG/0.5ML Solution Auto-injector Inject 1 Application as instructed as needed. 0.5 mL 6   • ondansetron (ZOFRAN ODT) 4 MG TABLET DISPERSIBLE Take 1 Tab by mouth every four hours as needed (give PO if no IV route available). 30 Tab 0     No current facility-administered medications for this visit.      He  has a past medical history of Allergy; Anxiety; Diabetes (HCC); Head ache; Hypertension; and Migraines.    ROS as stated in hpi  No chest pain, no shortness of breath, no abdominal pain       Objective:     Blood pressure 102/72, pulse (!) 107, temperature 36.9 °C (98.4 °F), resp. rate 14, height 1.854 m (6' 1\"), weight 88.5 kg (195 lb), SpO2 98 %. Body mass index is 25.73 kg/m². stable  Physical Exam:  Constitutional: Alert, no distress.  Skin: Warm, dry, good turgor,no cyanosis, no rashes in visible areas.  Eye: Equal, round and reactive, conjunctiva clear, lids normal.  Ears: No tenderness, no discharge.  External canals are without any significant edema or erythema.    Gross auditory acuity is intact.  Nose: symmetrical without tenderness, no discharge.  Mouth/Throat: lips without lesion.  Oropharynx clear.   Neck: Trachea midline, no masses, no obvious thyroid enlargement.. No cervical or supraclavicular lymphadenopathy. No occipital lymph nodes noted on exam.  Range of motion within normal limits.  Neuro: Cranial nerves 2-12 grossly intact.  No " sensory deficit.  Respiratory: Unlabored respiratory effort, lungs clear to auscultation, no wheezes, no ronchi.  Cardiovascular: Normal S1, S2, no murmur, no edema.  Psych: Alert and oriented x3, normal affect and mood and judgement.        Assessment and Plan:   The following treatment plan was discussed    1. Intractable cluster headache syndrome, unspecified chronicity pattern  Chronic, ongoing.  Being followed by neuro.  Has botox injections scheduled.  Monitor.     2. Essential hypertension  Chronic, ongoing. Stable.  No meds at this time. Monitor and follow.     3. Type 2 diabetes mellitus with complication, without long-term current use of insulin (HCC)  Chronic, ongoing, unstable.  Due for A1c.  Seeing endocrinology.  Last a1c 10.5.  Monitor.   - COMP METABOLIC PANEL; Future  - HEMOGLOBIN A1C; Future    4. Hx of migraines  See #1    5. Uncontrolled type 2 diabetes mellitus with diabetic neuropathy, without long-term current use of insulin (HCC)  See #3    6. Seizure cerebral  Chronic, stable.  Followed by Dr. Kimball, neurololgy.  Depakote daily.  No breakthrough reported.  Has follow up EEg tomorrow.  Monitor results.       Followup: Return in about 6 months (around 12/11/2018) for Diabetes.

## 2018-06-13 ENCOUNTER — NON-PROVIDER VISIT (OUTPATIENT)
Dept: NEUROLOGY | Facility: MEDICAL CENTER | Age: 43
End: 2018-06-13
Payer: COMMERCIAL

## 2018-06-13 ENCOUNTER — TELEPHONE (OUTPATIENT)
Dept: MEDICAL GROUP | Facility: PHYSICIAN GROUP | Age: 43
End: 2018-06-13

## 2018-06-13 DIAGNOSIS — G40.909 SEIZURE CEREBRAL (HCC): ICD-10-CM

## 2018-06-13 PROCEDURE — 95953 PR EEG MONITORING/COMPUTER: CPT | Performed by: PSYCHIATRY & NEUROLOGY

## 2018-06-13 NOTE — TELEPHONE ENCOUNTER
----- Message from NHAN Oshea sent at 6/13/2018  7:04 AM PDT -----  Ivan  Thanks for getting labs done.  Not sure if you saw that your A1c had come down to 8.7 from 3 months ago.  Good work!  Hoping all goes well with your EEG's  NHAN Oshea

## 2018-06-14 ENCOUNTER — NON-PROVIDER VISIT (OUTPATIENT)
Dept: NEUROLOGY | Facility: MEDICAL CENTER | Age: 43
End: 2018-06-14
Payer: COMMERCIAL

## 2018-06-14 DIAGNOSIS — G40.909 SEIZURE CEREBRAL (HCC): ICD-10-CM

## 2018-06-14 PROCEDURE — 95953 PR EEG MONITORING/COMPUTER: CPT | Performed by: PSYCHIATRY & NEUROLOGY

## 2018-06-15 ENCOUNTER — NON-PROVIDER VISIT (OUTPATIENT)
Dept: NEUROLOGY | Facility: MEDICAL CENTER | Age: 43
End: 2018-06-15
Payer: COMMERCIAL

## 2018-06-18 NOTE — PROGRESS NOTES
24 HOUR AMBULATORY ROUTINE ELECTROENCEPHALOGRAM REPORT FROM 6/13/2018 TO 6/14/2018      REFERRING DR: Jigar    INDICATION: Newly onset of Seizure Jan 2018  Worsening migraine- daily headache since 11/2017- everyday  HX migraine  Newly dx- DM 2017  DM Neuropathy  Vitamin Deficency    DURATION: 23 hours    TECHNIQUE:  Routine electroencephalogram (EEG) was performed in accordance with the international 10-20 system. The study was reviewed in bipolar and referential montages. The recording examined the patient during wakeful and drowsy state(s).     DESCRIPTION OF THE RECORD:        Background rhythm during awake stage shows well-organized, well-developed, average voltage 10 to 11 hertz alpha activity in the posterior regions.  It blocks with eye opening and it is bilaterally synchronous and symmetrical.  No spike-and-wave discharges or any lateralizing abnormalities are seen.  Photic stimulation did not produce any abnormalities. Hyperventilation did not produce any abnormalities.  No abnormalities were found during the procedure. Stage II sleep was achieved.      ACTIVATION PROCEDURES:      HV and PS are done    ICTAL AND/OR INTERICTAL FINDINGS:    No focal or generalized epileptiform activity noted. No regional slowing was seen during this routine study.  No clinical events or seizures were reported or recorded during the study.      EKG: sampling of the EKG recording demonstrated sinus rhythm.        INTERPRETATION:      ________________________________________________________________________    This is 23 hour ambulatory EEG recording  FROM 6/13/2018 TO 6/14/2018 in the awake and drowsy/sleep state(s) is normal    This ambulatory scalp EEG remains not remarkable    Of note, unremarkable EEG does not completely exclude the diagnosis  of seizures since seizure is an episodic phenomena and frontal lobe seizures could have normal scalp EEG. Clinical correlation may  help      ________________________________________________________________________

## 2018-06-19 NOTE — PROGRESS NOTES
24 HOUR AMBULATORY ROUTINE ELECTROENCEPHALOGRAM REPORT FROM 6/14/2018 TO 6/15/2018      REFERRING DR: Jigar    INDICATION: Newly onset of Seizure Jan 2018  Worsening migraine- daily headache since 11/2017- everyday  HX migraine  Newly dx- DM 2017  DM Neuropathy  Vitamin Deficency    DURATION: 24 hours    TECHNIQUE:  Routine electroencephalogram (EEG) was performed in accordance with the international 10-20 system. The study was reviewed in bipolar and referential montages. The recording examined the patient during wakeful and drowsy state(s).     DESCRIPTION OF THE RECORD:        Background rhythm during awake stage shows well-organized, well-developed, average voltage 10 to 11 hertz alpha activity in the posterior regions.  It blocks with eye opening and it is bilaterally synchronous and symmetrical.  No spike-and-wave discharges or any lateralizing abnormalities are seen.  Photic stimulation did not produce any abnormalities. Hyperventilation did not produce any abnormalities.  No abnormalities were found during the procedure. Stage II sleep was achieved. Events buttons were activated by the software without EEG abnormalities.      ACTIVATION PROCEDURES:      HV and PS are done    ICTAL AND/OR INTERICTAL FINDINGS:    No focal or generalized epileptiform activity noted. No regional slowing was seen during this routine study.  No clinical events or seizures were reported or recorded during the study.      EKG: sampling of the EKG recording demonstrated sinus rhythm.        INTERPRETATION:      ________________________________________________________________________    This is 23 hour ambulatory EEG recording  FROM 6/14/2018 TO 6/15/2018 in the awake and drowsy/sleep state(s) is normal    This ambulatory scalp EEG remains not remarkable    Of note, unremarkable EEG does not completely exclude the diagnosis  of seizures since seizure is an episodic phenomena and frontal lobe seizures could have normal scalp EEG.  Clinical correlation may help      ________________________________________________________________________

## 2018-06-19 NOTE — PROCEDURES
DATE OF SERVICE:  06/13/2018    This is a 24-hour ambulatory EEG done from 06/13/2018 to 06/14/2018.    24 HOUR AMBULATORY ROUTINE ELECTROENCEPHALOGRAM REPORT FROM 6/13/2018 TO 6/14/2018        REFERRING DR: Jigar     INDICATION: Newly onset of Seizure Jan 2018  Worsening migraine- daily headache since 11/2017- everyday  HX migraine  Newly dx- DM 2017  DM Neuropathy  Vitamin Deficency     DURATION: 23 hours     TECHNIQUE:  Routine electroencephalogram (EEG) was performed in accordance with the international 10-20 system. The study was reviewed in bipolar and referential montages. The recording examined the patient during wakeful and drowsy state(s).      DESCRIPTION OF THE RECORD:           Background rhythm during awake stage shows well-organized, well-developed, average voltage 10 to 11 hertz alpha activity in the posterior regions.  It blocks with eye opening and it is bilaterally synchronous and symmetrical.  No spike-and-wave discharges or any lateralizing abnormalities are seen.  Photic stimulation did not produce any abnormalities. Hyperventilation did not produce any abnormalities.  No abnormalities were found during the procedure. Stage II sleep was achieved.        ACTIVATION PROCEDURES:       HV and PS are done     ICTAL AND/OR INTERICTAL FINDINGS:    No focal or generalized epileptiform activity noted. No regional slowing was seen during this routine study.  No clinical events or seizures were reported or recorded during the study.       EKG: sampling of the EKG recording demonstrated sinus rhythm.          INTERPRETATION:        ________________________________________________________________________     This is 23 hour ambulatory EEG recording  FROM 6/13/2018 TO 6/14/2018 in the awake and drowsy/sleep state(s) is normal     This ambulatory scalp EEG remains not remarkable     Of note, unremarkable EEG does not completely exclude the diagnosis  of seizures since seizure is an episodic phenomena and  frontal lobe seizures could have normal scalp EEG. Clinical correlation may help        ________________________________________________________________________                   ____________________________________     MD CONOR POTTER / JAMEY    DD:  06/19/2018 14:42:57  DT:  06/19/2018 14:51:35    D#:  [id number]  Job#:  [id number]

## 2018-06-20 ENCOUNTER — TELEPHONE (OUTPATIENT)
Dept: ENDOCRINOLOGY | Facility: MEDICAL CENTER | Age: 43
End: 2018-06-20

## 2018-06-20 NOTE — PROCEDURES
DATE OF SERVICE:  06/15/2018    This is a 24-hour ambulatory EEG from 06/14 to 06/15.    24 HOUR AMBULATORY ROUTINE ELECTROENCEPHALOGRAM REPORT FROM 6/14/2018 TO 6/15/2018        REFERRING DR: Jigar     INDICATION: Newly onset of Seizure Jan 2018  Worsening migraine- daily headache since 11/2017- everyday  HX migraine  Newly dx- DM 2017  DM Neuropathy  Vitamin Deficency     DURATION: 24 hours     TECHNIQUE:  Routine electroencephalogram (EEG) was performed in accordance with the international 10-20 system. The study was reviewed in bipolar and referential montages. The recording examined the patient during wakeful and drowsy state(s).      DESCRIPTION OF THE RECORD:           Background rhythm during awake stage shows well-organized, well-developed, average voltage 10 to 11 hertz alpha activity in the posterior regions.  It blocks with eye opening and it is bilaterally synchronous and symmetrical.  No spike-and-wave discharges or any lateralizing abnormalities are seen.  Photic stimulation did not produce any abnormalities. Hyperventilation did not produce any abnormalities.  No abnormalities were found during the procedure. Stage II sleep was achieved. Events buttons were activated by the software without EEG abnormalities.        ACTIVATION PROCEDURES:       HV and PS are done     ICTAL AND/OR INTERICTAL FINDINGS:    No focal or generalized epileptiform activity noted. No regional slowing was seen during this routine study.  No clinical events or seizures were reported or recorded during the study.       EKG: sampling of the EKG recording demonstrated sinus rhythm.          INTERPRETATION:        ________________________________________________________________________     This is 23 hour ambulatory EEG recording  FROM 6/14/2018 TO 6/15/2018 in the awake and drowsy/sleep state(s) is normal     This ambulatory scalp EEG remains not remarkable     Of note, unremarkable EEG does not completely exclude the diagnosis  of  seizures since seizure is an episodic phenomena and frontal lobe seizures could have normal scalp EEG. Clinical correlation may help        ________________________________________________________________________             ____________________________________     MD CONOR POTTER / JAMEY    DD:  06/19/2018 14:54:05  DT:  06/19/2018 15:00:45    D#:  [id number]  Job#:  [id number]

## 2018-06-21 ENCOUNTER — TELEPHONE (OUTPATIENT)
Dept: MEDICAL GROUP | Facility: PHYSICIAN GROUP | Age: 43
End: 2018-06-21

## 2018-06-21 NOTE — TELEPHONE ENCOUNTER
EEG was ordered by neurology and has the results and would be able to explain them.    Shauna Mclean D.O.   Covering for ODILON Oshea.

## 2018-06-25 ENCOUNTER — TELEPHONE (OUTPATIENT)
Dept: ENDOCRINOLOGY | Facility: MEDICAL CENTER | Age: 43
End: 2018-06-25

## 2018-07-03 ENCOUNTER — APPOINTMENT (OUTPATIENT)
Dept: ENDOCRINOLOGY | Facility: MEDICAL CENTER | Age: 43
End: 2018-07-03
Payer: COMMERCIAL

## 2018-07-03 ENCOUNTER — PATIENT MESSAGE (OUTPATIENT)
Dept: MEDICAL GROUP | Facility: PHYSICIAN GROUP | Age: 43
End: 2018-07-03

## 2018-07-03 ENCOUNTER — OFFICE VISIT (OUTPATIENT)
Dept: NEUROLOGY | Facility: MEDICAL CENTER | Age: 43
End: 2018-07-03
Payer: COMMERCIAL

## 2018-07-03 VITALS
HEIGHT: 72 IN | HEART RATE: 78 BPM | TEMPERATURE: 97.8 F | OXYGEN SATURATION: 98 % | SYSTOLIC BLOOD PRESSURE: 112 MMHG | BODY MASS INDEX: 27.47 KG/M2 | DIASTOLIC BLOOD PRESSURE: 64 MMHG | WEIGHT: 202.82 LBS

## 2018-07-03 PROCEDURE — 64615 CHEMODENERV MUSC MIGRAINE: CPT | Performed by: PHYSICIAN ASSISTANT

## 2018-07-03 NOTE — PROGRESS NOTES
Initial botox on this established patient of dr. deluca     treated this patient in clinic today with BotoxA 155 units according to the dosing/injection paradigm currently mandated by the FDA for the management of chronic migraine. Specifically, I injected 5 units to the procerus, 5 units to the corrugators bilaterally, a total of 20 units to the frontalis musculature, 20 units to the temporalis bilaterally, 15 units to the occipitalis bilaterally, 10 units to the cervical paraspinals bilaterally and 15 units to the trapezius musculature bilaterally.    Pt was advised of side effects associated with medication.    The remainder of the Botox 200 units mixed but not administered was discarded as wastage per FDA guidelines.

## 2018-07-11 ENCOUNTER — TELEPHONE (OUTPATIENT)
Dept: MEDICAL GROUP | Facility: PHYSICIAN GROUP | Age: 43
End: 2018-07-11

## 2018-07-11 NOTE — TELEPHONE ENCOUNTER
VOICEMAIL  1. Caller Name: Ivan                      Call Back Number: [phone] (home)       2. Message: Patient has an appointment with endocrinology tomorrow but wanted you to know that he is not doing good today, head is bad and having some dizzy spells.    3. Patient approves office to leave a detailed voicemail/MyChart message: N\A

## 2018-07-12 ENCOUNTER — OFFICE VISIT (OUTPATIENT)
Dept: ENDOCRINOLOGY | Facility: MEDICAL CENTER | Age: 43
End: 2018-07-12
Payer: COMMERCIAL

## 2018-07-12 VITALS
HEIGHT: 72 IN | OXYGEN SATURATION: 97 % | SYSTOLIC BLOOD PRESSURE: 120 MMHG | DIASTOLIC BLOOD PRESSURE: 80 MMHG | HEART RATE: 116 BPM | WEIGHT: 202.4 LBS | BODY MASS INDEX: 27.41 KG/M2

## 2018-07-12 DIAGNOSIS — I10 ESSENTIAL HYPERTENSION: ICD-10-CM

## 2018-07-12 PROCEDURE — 99214 OFFICE O/P EST MOD 30 MIN: CPT | Mod: 24 | Performed by: PHYSICIAN ASSISTANT

## 2018-07-12 NOTE — PROGRESS NOTES
Return to office Patient Consult Note  Referred by: NHAN Oshea    Reason for consult: Diabetes Management Type 2    HPI:  Ivan Prescott is a 43 y.o. old patient who is seeing us today for diabetes care.  This is a pleasant patient with diabetes and I appreciate the opportunity to participate in the care of this patient.    BG Diary:7/12/2018  In the AM: 121, 109, 115, 140, 119, 131    BG Diary:6/12/2018  In the AM: 195, 200, 195, 147, 143, 238, 185, 270  Before Bed: 155, 190, 200, 185, 190, 235, 210      BG Diary:5/3/2018  Before Bed: 220, 175, 225, 218, 210, 181, 183, 173, 224     Labs of 3/15/18 HbA1c is 10.5, GFR >60  Labs of 4/12/18 HbA1c was 12    Weight: on 5/3/18 he is 191 on 4/12/18 he was 202    1. Uncontrolled type 2 diabetes mellitus with diabetic neuropathy, without long-term current use of insulin (Formerly [facility])    This is a new patient with me on 4/12/18  He was on:  1.   Metformin 1000mg     STOP:  Metformin     Now on:  1.  Xigduo 10/500 one in the AM   2.  Ozempic 1.0 once a week   3.  Actos 30mg once a day   4.  Start Tresiba 10 units at night before bed (Started 6/12/18)     2. Essential hypertension  This is stable        ROS:   Constitutional: No night sweats.  Eyes:  No visual changes.  Cardiac: No chest pain, No palpitations or racing heart rate.  Resp: No shortness of breath, No cough,   Gi: No Diarrhea    All other systems were reviewed and were/are negative.  The ROS was revised/revisited during this office visit from the patients first office visit with me on 4/121/8 Please review the full ROS during the first office visit.    Past Medical History:  Patient Active Problem List    Diagnosis Date Noted   • Headache 01/25/2018     Priority: High   • DM (diabetes mellitus) (Formerly [facility]) 01/24/2018     Priority: Medium   • Essential hypertension 04/19/2017     Priority: Medium   • Chronic migraine 07/03/2018   • Vitamin deficiency 04/03/2018   • Neuropathy (Formerly [facility]) 04/03/2018   • Seizure cerebral  04/03/2018   • Restlessness and agitation 03/15/2018   • Blurry vision, bilateral 03/15/2018   • Uncontrolled type 2 diabetes mellitus with diabetic neuropathy, without long-term current use of insulin (HCC) 04/19/2017   • Pain in both feet 03/22/2017   • Erectile dysfunction 03/22/2017   • Chest skin lesion 03/22/2017   • Hx of migraines 03/22/2017       Past Surgical History:  Past Surgical History:   Procedure Laterality Date   • EYE SURGERY     • KNEE ARTHROTOMY      plica removal 2016       Allergies:  Acetaminophen    Social History:  Social History     Social History   • Marital status:      Spouse name: N/A   • Number of children: N/A   • Years of education: N/A     Occupational History   • Not on file.     Social History Main Topics   • Smoking status: Never Smoker   • Smokeless tobacco: Current User     Types: Chew   • Alcohol use No   • Drug use: No   • Sexual activity: Yes     Partners: Female     Other Topics Concern   • Not on file     Social History Narrative   • No narrative on file       Family History:  Family History   Problem Relation Age of Onset   • No Known Problems Mother    • No Known Problems Father    • No Known Problems Sister    • Diabetes Paternal Grandmother        Medications:    Current Outpatient Prescriptions:   •  Semaglutide (OZEMPIC) 1 MG/DOSE Solution Pen-injector, Inject 1 mg as instructed every 7 days., Disp: 1 PEN, Rfl: 11  •  divalproex ER (DEPAKOTE ER) 500 MG TABLET SR 24 HR, Take 1 Tab by mouth 2 Times a Day. (Patient taking differently: Take 1,000 mg by mouth 2 Times a Day.), Disp: 60 Tab, Rfl: 3  •  gabapentin (NEURONTIN) 600 MG tablet, Take 2 Tabs by mouth 3 times a day., Disp: 180 Tab, Rfl: 6  •  Blood Glucose Monitoring Suppl Supplies Misc, Test strips order: Test strips for Abbott Precision xtra meter. Sig: use am and prn ssx high or low sugar, Disp: 100 Each, Rfl: 3  •  Blood Glucose Monitoring Suppl Device, Meter: Dispense Abbott Precision xtra meter. Sig.  Use as directed for blood sugar monitoring., Disp: 1 Device, Rfl: 1  •  pioglitazone (ACTOS) 30 MG Tab, Take 1 Tab by mouth every day., Disp: 30 Tab, Rfl: 11  •  Dapagliflozin-Metformin HCl ER (XIGDUO XR)  MG TABLET SR 24 HR, Take 1 Tab by mouth every morning., Disp: 30 Tab, Rfl: 11  •  Lancets Misc, Lancets order: Lancets for Abbott Precision xtra meter. Sig: use am and prn ssx high or low sugar., Disp: 100 Each, Rfl: 3  •  SUMAtriptan (IMITREX) 50 MG Tab, Take 1 Tab by mouth Once PRN for Migraine., Disp: 12 Tab, Rfl: 6  •  SUMAtriptan Succinate 4 MG/0.5ML Solution Auto-injector, Inject 1 Application as instructed as needed., Disp: 0.5 mL, Rfl: 6  •  ondansetron (ZOFRAN ODT) 4 MG TABLET DISPERSIBLE, Take 1 Tab by mouth every four hours as needed (give PO if no IV route available)., Disp: 30 Tab, Rfl: 0        Physical Examination:   Vital signs: /80   Pulse (!) 116   Ht 1.829 m (6')   Wt 91.8 kg (202 lb 6.4 oz)   SpO2 97%   BMI 27.45 kg/m²   General: No distress, cooperative, well dressed and well nourished.   Eyes: No scleral icterus or discharge, No hyposphagma  ENMT: Normal on external inspection of nose, lips, No nasal drainage   Neck: No abnormal masses on inspection  Resp: Normal effort, Bilateral clear to auscultation, No wheezing, No rales  CVS: Regular rate and rhythm, S1 S2 normal, No murmur. No gallop  Extremities: No edema bilateral extremities  Neuro: Alert and oriented  Skin: No rash, No Ulcers  Psych: Normal mood and affect      Assessment and Plan:    1. Uncontrolled type 2 diabetes mellitus with diabetic neuropathy, without long-term current use of insulin (HCC)    Now on:  1.  Xigduo 10/500 one in the AM   2.  Ozempic 1.0 once a week  (STOP)  3.  Actos 30mg once a day   4.  Tresiba 10 units at night before bed (INCREASE 16)  5. Start at Trulicity 0.75 once a week for one month and return       2. Essential hypertension  Doing very well    Return in about 1 month (around  8/12/2018).    Blood glucose log: Check BG in the morning when wake up, before lunch or dinner and before bed.  So three times a day.  Always bring BG diary to the next office visit.       Thank you kindly for allowing me to participate in the diabetes care plan for this patient.    Alton Morales PA-C, BC-[facility]  Board Certified - Advanced Diabetes Management  07/12/18    CC:   NHAN Oshea

## 2018-07-12 NOTE — PATIENT INSTRUCTIONS
Now on:  1.  Xigduo 10/500 one in the AM   2.  Ozempic 1.0 once a week  (STOP)  3.  Actos 30mg once a day   4.  Tresiba 10 units at night before bed (INCREASE 16)  5. Start at Trulicity 0.75 once a week for one month and return

## 2018-07-17 ENCOUNTER — TELEPHONE (OUTPATIENT)
Dept: MEDICAL GROUP | Facility: PHYSICIAN GROUP | Age: 43
End: 2018-07-17

## 2018-07-17 NOTE — TELEPHONE ENCOUNTER
VOICEMAIL  1. Caller Name: Ivan                      Call Back Number: [phone] (home)       2. Message: Patient states he was not feeling well at all yesterday, flu like symptoms with nausea. His blood sugar last night was 164 and he went to bed pretty early, when he woke up this morning to go pee he smelled poop and realized he had pooped himself in the night...very embarrassed and concerned as this has never happened. Blood sugar this morning was 111 and he is having burps again with the bad after taste and foul smelling farts. Please advise     3. Patient approves office to leave a detailed voicemail/MyChart message: N\A

## 2018-07-17 NOTE — TELEPHONE ENCOUNTER
I would recommend lots of fluids to stay hydrated as patient may have a little GI bug.  Pepto bismal can help calm all of the GI tract down.  If he is having diarrhea, you can take some over the counter anti-diarrhea medication.    If fever, chills, vomitting or blood sugar lower than 80, should be seen in ER.  Feel better soon.  ODILON Oshea.

## 2018-07-18 ENCOUNTER — TELEPHONE (OUTPATIENT)
Dept: ENDOCRINOLOGY | Facility: MEDICAL CENTER | Age: 43
End: 2018-07-18

## 2018-07-18 NOTE — TELEPHONE ENCOUNTER
Prior authorization initiated through covermymeds for ozempic 1mg/1dose pen-injector  Key code: [id number]

## 2018-07-19 ENCOUNTER — TELEPHONE (OUTPATIENT)
Dept: ENDOCRINOLOGY | Facility: MEDICAL CENTER | Age: 43
End: 2018-07-19

## 2018-07-21 ENCOUNTER — PATIENT MESSAGE (OUTPATIENT)
Dept: MEDICAL GROUP | Facility: PHYSICIAN GROUP | Age: 43
End: 2018-07-21

## 2018-08-13 ENCOUNTER — OFFICE VISIT (OUTPATIENT)
Dept: ENDOCRINOLOGY | Facility: MEDICAL CENTER | Age: 43
End: 2018-08-13
Payer: COMMERCIAL

## 2018-08-13 ENCOUNTER — TELEPHONE (OUTPATIENT)
Dept: MEDICAL GROUP | Facility: PHYSICIAN GROUP | Age: 43
End: 2018-08-13

## 2018-08-13 VITALS
WEIGHT: 201.8 LBS | HEIGHT: 72 IN | HEART RATE: 100 BPM | DIASTOLIC BLOOD PRESSURE: 68 MMHG | OXYGEN SATURATION: 98 % | BODY MASS INDEX: 27.33 KG/M2 | SYSTOLIC BLOOD PRESSURE: 112 MMHG

## 2018-08-13 DIAGNOSIS — I10 ESSENTIAL HYPERTENSION: ICD-10-CM

## 2018-08-13 PROCEDURE — 99214 OFFICE O/P EST MOD 30 MIN: CPT | Performed by: PHYSICIAN ASSISTANT

## 2018-08-13 RX ORDER — TOPIRAMATE 50 MG/1
50 TABLET, FILM COATED ORAL
Refills: 4 | COMMUNITY
Start: 2018-07-22 | End: 2018-08-31

## 2018-08-13 RX ORDER — ONABOTULINUMTOXINA 200 [USP'U]/1
INJECTION, POWDER, LYOPHILIZED, FOR SOLUTION INTRADERMAL; INTRAMUSCULAR
COMMUNITY
Start: 2018-06-26 | End: 2019-08-30

## 2018-08-13 RX ORDER — DAPAGLIFLOZIN AND METFORMIN HYDROCHLORIDE 10; 1000 MG/1; MG/1
1 TABLET, FILM COATED, EXTENDED RELEASE ORAL EVERY MORNING
Qty: 30 TAB | Refills: 11 | Status: SHIPPED | OUTPATIENT
Start: 2018-08-13 | End: 2018-09-17 | Stop reason: SDUPTHER

## 2018-08-13 RX ORDER — BLOOD SUGAR DIAGNOSTIC
STRIP MISCELLANEOUS
Refills: 3 | COMMUNITY
Start: 2018-07-06 | End: 2018-08-13

## 2018-08-13 NOTE — PROGRESS NOTES
Return to office Patient Consult Note  Referred by: NHAN Oshea    Reason for consult: Diabetes Management Type 2    HPI:  Ivan Prescott is a 43 y.o. old patient who is seeing us today for diabetes care.  This is a pleasant patient with diabetes and I appreciate the opportunity to participate in the care of this patient.     Labs of 8/13/18 HbA1c is 8.7  Labs of 3/15/18 HbA1c is 10.5, GFR >60  Labs of 4/12/18 HbA1c was 12    BG Diary:8/13/2018  In the AM: 85, 66, 76, 66, 100, 88, 100, 90, 75, 111  Before Bed: 105, 1:15, 110, 111, 100, 15, 120     BG Diary:7/12/2018  In the AM: 121, 109, 115, 140, 119, 131     BG Diary:6/12/2018  In the AM: 195, 200, 195, 147, 143, 238, 185, 270  Before Bed: 155, 190, 200, 185, 190, 235, 210     Weight: on 5/3/18 he is 201 on 4/12/18 he was 202      1. Uncontrolled type 2 diabetes mellitus with diabetic neuropathy, without long-term current use of insulin (HCC)  This is a new patient with me on 4/12/18  He was on:  1.   Metformin 1000mg     STOP:  Metformin     Now on:  1.  Xigduo 10/1000 one in the AM   2.  Trulicity 1.5 qweekly  3.  Actos 30mg once a day   4.  Tresiba 16 units at night before bed       2. Essential hypertension  This is stable and no changes needed        ROS:   Constitutional: No night sweats.  Eyes:  No visual changes.  Cardiac: No chest pain, No palpitations or racing heart rate.  Resp: No shortness of breath, No cough,   Gi: No Diarrhea    All other systems were reviewed and were/are negative.  The ROS was revised/revisited during this office visit from the patients first office visit with me on 4/12/18 Please review the full ROS during the first office visit.    Past Medical History:  Patient Active Problem List    Diagnosis Date Noted   • Headache 01/25/2018     Priority: High   • DM (diabetes mellitus) (HCC) 01/24/2018     Priority: Medium   • Essential hypertension 04/19/2017     Priority: Medium   • Chronic migraine 07/03/2018   • Vitamin  deficiency 04/03/2018   • Neuropathy ([facility]) 04/03/2018   • Seizure cerebral 04/03/2018   • Restlessness and agitation 03/15/2018   • Blurry vision, bilateral 03/15/2018   • Uncontrolled type 2 diabetes mellitus with diabetic neuropathy, without long-term current use of insulin ([facility]) 04/19/2017   • Pain in both feet 03/22/2017   • Erectile dysfunction 03/22/2017   • Chest skin lesion 03/22/2017   • Hx of migraines 03/22/2017       Past Surgical History:  Past Surgical History:   Procedure Laterality Date   • EYE SURGERY     • KNEE ARTHROTOMY      plica removal 2016       Allergies:  Acetaminophen    Social History:  Social History     Social History   • Marital status:      Spouse name: N/A   • Number of children: N/A   • Years of education: N/A     Occupational History   • Not on file.     Social History Main Topics   • Smoking status: Never Smoker   • Smokeless tobacco: Current User     Types: Chew   • Alcohol use No   • Drug use: No   • Sexual activity: Yes     Partners: Female     Other Topics Concern   • Not on file     Social History Narrative   • No narrative on file       Family History:  Family History   Problem Relation Age of Onset   • No Known Problems Mother    • No Known Problems Father    • No Known Problems Sister    • Diabetes Paternal Grandmother        Medications:    Current Outpatient Prescriptions:   •  PRECISION XTRA TEST STRIPS strip, USE TO TEST IN THE AM TO TEST AS NEEDED FOR SYMPTOMSOF HIGH OR LOW SUGAR AS DIRECTED BY MD, Disp: , Rfl: 3  •  BOTOX 200 units Recon Soln, , Disp: , Rfl:   •  topiramate (TOPAMAX) 50 MG tablet, Take 50 mg by mouth., Disp: , Rfl: 4  •  Semaglutide (OZEMPIC) 1 MG/DOSE Solution Pen-injector, Inject 1 mg as instructed every 7 days., Disp: 1 PEN, Rfl: 11  •  pioglitazone (ACTOS) 30 MG Tab, Take 1 Tab by mouth every day., Disp: 30 Tab, Rfl: 11  •  Dapagliflozin-Metformin HCl ER (XIGDUO XR)  MG TABLET SR 24 HR, Take 1 Tab by mouth every morning., Disp: 30  "Tab, Rfl: 11  •  divalproex ER (DEPAKOTE ER) 500 MG TABLET SR 24 HR, Take 1 Tab by mouth 2 Times a Day. (Patient taking differently: Take 1,000 mg by mouth 2 Times a Day.), Disp: 60 Tab, Rfl: 3  •  gabapentin (NEURONTIN) 600 MG tablet, Take 2 Tabs by mouth 3 times a day., Disp: 180 Tab, Rfl: 6  •  Lancets Misc, Lancets order: Lancets for Abbott Precision xtra meter. Sig: use am and prn ssx high or low sugar., Disp: 100 Each, Rfl: 3  •  SUMAtriptan (IMITREX) 50 MG Tab, Take 1 Tab by mouth Once PRN for Migraine., Disp: 12 Tab, Rfl: 6  •  SUMAtriptan Succinate 4 MG/0.5ML Solution Auto-injector, Inject 1 Application as instructed as needed., Disp: 0.5 mL, Rfl: 6  •  Blood Glucose Monitoring Suppl Supplies Misc, Test strips order: Test strips for Abbott Precision xtra meter. Sig: use am and prn ssx high or low sugar, Disp: 100 Each, Rfl: 3  •  Blood Glucose Monitoring Suppl Device, Meter: Dispense Abbott Precision xtra meter. Sig. Use as directed for blood sugar monitoring., Disp: 1 Device, Rfl: 1  •  ondansetron (ZOFRAN ODT) 4 MG TABLET DISPERSIBLE, Take 1 Tab by mouth every four hours as needed (give PO if no IV route available)., Disp: 30 Tab, Rfl: 0        Physical Examination:   Vital signs: /68   Pulse 100   Ht 1.829 m (6' 0.01\")   Wt 91.5 kg (201 lb 12.8 oz)   SpO2 98%   BMI 27.36 kg/m²   General: No distress, cooperative, well dressed and well nourished.   Eyes: No scleral icterus or discharge, No hyposphagma  ENMT: Normal on external inspection of nose, lips, No nasal drainage   Neck: No abnormal masses on inspection  Resp: Normal effort, Bilateral clear to auscultation, No wheezing, No rales  CVS: Regular rate and rhythm, S1 S2 normal, No murmur. No gallop  Extremities: No edema bilateral extremities  Neuro: Alert and oriented  Skin: No rash, No Ulcers  Psych: Normal mood and affect      Assessment and Plan:    1. Uncontrolled type 2 diabetes mellitus with diabetic neuropathy, without long-term " current use of insulin (HCC)  Now on:  1.  Xigduo 10/1000 one in the AM   2.  Trulicity 1.5 qweekly  3.  Actos 30mg once a day   4.  Tresiba 16 units at night before bed (DECREASE to 10)    2. Essential hypertension  This is WNL    No Follow-up on file.    Blood glucose log: Check BG in the morning when wake up, before lunch or dinner and before bed.  So three times a day.  Always bring BG diary to the next office visit.        Thank you kindly for allowing me to participate in the diabetes care plan for this patient.    Alton Morales PA-C, BC-[facility]  Board Certified - Advanced Diabetes Management  08/13/18    CC:   HNAN Oshea

## 2018-08-13 NOTE — PATIENT INSTRUCTIONS
Now on:  1.  Xigduo 10/500 one in the AM   2.  Trulicity 1.5 qweekly  3.  Actos 30mg once a day   4.  Tresiba 16 units at night before bed (DECREASE to 10)

## 2018-08-21 ENCOUNTER — PATIENT MESSAGE (OUTPATIENT)
Dept: MEDICAL GROUP | Facility: PHYSICIAN GROUP | Age: 43
End: 2018-08-21

## 2018-08-21 NOTE — TELEPHONE ENCOUNTER
I called the patient, he states he called last week. I did not receive a message but I was only in the office for 1 1/2 days last week. He will send another message through my chart as he needs a letter for work. He is out of sick time and needs to get some donations from his co-workers who are willing, but he must have a note from his primary.

## 2018-08-21 NOTE — TELEPHONE ENCOUNTER
From: Ivan Prescott  To: NHAN Oshea  Sent: 8/21/2018 1:12 PM PDT  Subject: Non-Urgent Medical Question    Left messages please give me a call asap

## 2018-08-23 ENCOUNTER — PATIENT MESSAGE (OUTPATIENT)
Dept: MEDICAL GROUP | Facility: PHYSICIAN GROUP | Age: 43
End: 2018-08-23

## 2018-08-23 RX ORDER — DIVALPROEX SODIUM 500 MG/1
500 TABLET, EXTENDED RELEASE ORAL 2 TIMES DAILY
Qty: 180 TAB | Refills: 1 | Status: SHIPPED | OUTPATIENT
Start: 2018-08-23 | End: 2018-08-31 | Stop reason: SDUPTHER

## 2018-08-31 ENCOUNTER — OFFICE VISIT (OUTPATIENT)
Dept: NEUROLOGY | Facility: MEDICAL CENTER | Age: 43
End: 2018-08-31
Payer: COMMERCIAL

## 2018-08-31 VITALS
HEART RATE: 76 BPM | SYSTOLIC BLOOD PRESSURE: 120 MMHG | WEIGHT: 200.62 LBS | DIASTOLIC BLOOD PRESSURE: 78 MMHG | BODY MASS INDEX: 27.17 KG/M2 | HEIGHT: 72 IN | OXYGEN SATURATION: 98 % | TEMPERATURE: 98.4 F

## 2018-08-31 DIAGNOSIS — G40.909 SEIZURE CEREBRAL (HCC): ICD-10-CM

## 2018-08-31 DIAGNOSIS — G62.9 NEUROPATHY: ICD-10-CM

## 2018-08-31 PROCEDURE — 99214 OFFICE O/P EST MOD 30 MIN: CPT | Performed by: PSYCHIATRY & NEUROLOGY

## 2018-08-31 RX ORDER — AMITRIPTYLINE HYDROCHLORIDE 25 MG/1
25 TABLET, FILM COATED ORAL
Qty: 30 TAB | Refills: 4 | Status: SHIPPED | OUTPATIENT
Start: 2018-08-31 | End: 2019-01-29 | Stop reason: SDUPTHER

## 2018-08-31 RX ORDER — DIVALPROEX SODIUM 500 MG/1
500 TABLET, EXTENDED RELEASE ORAL 2 TIMES DAILY
Qty: 180 TAB | Refills: 1 | Status: SHIPPED | OUTPATIENT
Start: 2018-08-31 | End: 2019-03-22 | Stop reason: SDUPTHER

## 2018-08-31 NOTE — PROGRESS NOTES
NEUROLOGY NOTE    Referring Physician  NHAN Oshea    CHIEF COMPLAINT:  Newly onset seizure and bad headache Nov 2017-- one seizure Jan 2018  Headache improved after the botox  Chief Complaint   Patient presents with   • Follow-Up     seizure       PRESENT ILLNESS:     Migraine -- the patient used to have migrraine    Since Nov 2017--- developed new headache, started having bad headache  Jan 24 th 2018--- developed one seizure  Was found to have have diabetic mellitus March 2017    Tingling over both ulnar regions-- intermittently    Headache worse since Nov 2017  1. Location--left frontal   2. Characteristics-- ice-picking like, throbbing  3. Associated--light , noise  4. Worsening-- temperature, humidity, weather change, period  5. Relieving factors--   Rescue medicine imitrex  6. Family History-not she could recall  7. Every day- all day long   8. Severity-- can not work   9. Sleep-- no problem  10. Coffee intake-- not coffee addict    The patient is able to describe 3 kinds of headache  1. The worse one--- temporal to frontal-- lasting for half hour-- came out    2. Mediocre  3. Mild headache      PAST MEDICAL HISTORY:  Past Medical History:   Diagnosis Date   • Allergy    • Anxiety    • Diabetes (HCC)    • Head ache    • Hypertension    • Migraines        PAST SURGICAL HISTORY:  Past Surgical History:   Procedure Laterality Date   • EYE SURGERY     • KNEE ARTHROTOMY      plica removal 2016       FAMILY HISTORY:  Family History   Problem Relation Age of Onset   • No Known Problems Mother    • No Known Problems Father    • No Known Problems Sister    • Diabetes Paternal Grandmother        SOCIAL HISTORY:  Social History     Social History   • Marital status:      Spouse name: N/A   • Number of children: N/A   • Years of education: N/A     Occupational History   • Not on file.     Social History Main Topics   • Smoking status: Never Smoker   • Smokeless tobacco: Current User     Types: Chew      • Alcohol use No   • Drug use: No   • Sexual activity: Yes     Partners: Female     Other Topics Concern   • Not on file     Social History Narrative   • No narrative on file     ALLERGIES:  Allergies   Allergen Reactions   • Acetaminophen Swelling     Face swells up for 4 hours     TOBHX  History   Smoking Status   • Never Smoker   Smokeless Tobacco   • Current User   • Types: Chew     ALCHX  History   Alcohol Use No     DRUGHX  History   Drug Use No           MEDICATIONS:  Current Outpatient Prescriptions   Medication   • divalproex ER (DEPAKOTE ER) 500 MG TABLET SR 24 HR   • topiramate (TOPAMAX) 50 MG tablet   • Dapagliflozin-Metformin HCl ER (XIGDUO XR)  MG TABLET SR 24 HR   • Insulin Degludec (TRESIBA FLEXTOUCH) 200 UNIT/ML Solution Pen-injector   • Dulaglutide (TRULICITY) 1.5 MG/0.5ML Solution Pen-injector   • pioglitazone (ACTOS) 30 MG Tab   • gabapentin (NEURONTIN) 600 MG tablet   • Lancets Misc   • SUMAtriptan (IMITREX) 50 MG Tab   • SUMAtriptan Succinate 4 MG/0.5ML Solution Auto-injector   • ondansetron (ZOFRAN ODT) 4 MG TABLET DISPERSIBLE   • BOTOX 200 units Recon Soln     No current facility-administered medications for this visit.        REVIEW OF SYSTEM:    Constitutional: Denies fevers, Denies weight changes   Eyes: Denies changes in vision, no eye pain   Ears/Nose/Throat/Mouth: Denies nasal congestion or sore throat   Cardiovascular: Denies chest pain or palpitations   Respiratory: Denies SOB.   Gastrointestinal/Hepatic: Denies abdominal pain, nausea, vomiting, diarrhea, constipation or GI bleeding   Genitourinary: Denies bladder dysfunction, dysuria or frequency   Musculoskeletal/Rheum: Denies joint pain and swelling   Skin/Breast: Denies rash, denies breast lumps or discharge   Neurological: migraine, headache  Psychiatric: denies mood disorder   Endocrine: denies hx of diabetes or thyroid dysfunction   Heme/Oncology/Lymph Nodes: Denies enlarged lymph nodes, denies brusing or known  bleeding disorder   Allergic/Immunologic: Denies hx of allergies         PHYSICAL AND NEUROLOGICAL EXMAINATIONS:  VITAL SIGNS: /78   Pulse 76   Temp 36.9 °C (98.4 °F)   Ht 1.829 m (6')   Wt 91 kg (200 lb 9.9 oz)   SpO2 98%   BMI 27.21 kg/m²   CURRENT WEIGHT: BMI: Body mass index is 27.21 kg/m².  PREVIOUS WEIGHTS:  Wt Readings from Last 25 Encounters:   08/31/18 91 kg (200 lb 9.9 oz)   08/13/18 91.5 kg (201 lb 12.8 oz)   07/12/18 91.8 kg (202 lb 6.4 oz)   07/03/18 92 kg (202 lb 13.2 oz)   06/12/18 88.5 kg (195 lb)   06/11/18 88.5 kg (195 lb)   05/03/18 87 kg (191 lb 12.8 oz)   05/02/18 86.8 kg (191 lb 7.5 oz)   04/16/18 87.5 kg (193 lb)   04/12/18 87 kg (191 lb 12.8 oz)   04/03/18 87.2 kg (192 lb 5.6 oz)   03/15/18 86.6 kg (191 lb)   01/31/18 83.5 kg (184 lb)   01/24/18 86.2 kg (190 lb)   01/18/18 85.7 kg (189 lb)   10/24/17 85.3 kg (188 lb)   05/26/17 84.8 kg (187 lb)   05/15/17 84.8 kg (187 lb)   04/19/17 87.1 kg (192 lb)   03/22/17 85.7 kg (189 lb)   01/03/13 95.3 kg (210 lb)       General appearance of patient: WDWN(+) NAD(+)    EYES  o Fundus : Papilledem(-) Exudates(-) Hemorrhage(-)  Nervous System  Orientation to time, place and person(+)  Memory normal(+)  Language: aphasia(-)  Knowledge: past(+) Current(+)  Attention(+)  Cranial Nerves  • Nerve 2: intact  • Nerve 3,4,6: intact  • Nerve 5 : intact  • Nerve 7: intact  • Nerve 8: intact  • Nerve 9 & 10: intact  • Nerve 11: intact  • Nerve 12: intact  Muscle Power and muscle tone: symmetric, normal in upper and lower  Sensory System: Pin sensation intact(+)  Reflexes: symmetric throughout  Cerebellar Function FNP normal   Gait : Steady(+) TandemGait steady(+)  Heart and Vascular  Peripheral Vasucular system : Edema (-) Swelling(-)  RHB, Breathing sound clear  abdomen bowel sound normoactive  Extremities freely moveable  Joints no contracture       NEUROIMAGING: I reviewed the MRI/CT of brain             Explain to the patient that the worsening of  headache since Nov 2017 could be secondary to one of the following     1. Stressful like-- could worsen the underlined migraine ( Advil 30# per month, allergy to ASA)   2. Inflammation ( autoimmune), Infection ( fungus, chronic infection), neoplastic ( unlikely) etc    Tried Topamax, could not tolerate Topamax  Took Gabapentin for painful neuropathy    2 days of ambulatory EEG was not remarkable    IMPRESSION:    1. Newly onset of seizure Jan  2. Worsening migraine,Daily headache since Nov 2017-- every day-- before Botox-- Botox helped, Hx of migraine  3. Newly diagnosed as of Diabetic Mellitus 2017  4. Diabetic Mellitus neuropathy-- under gabapentin  5. Vit D deficency    PLAN/RECOMMENDATIONS:      Continue Depakote ER 500mg two times per day daily ---  Contine Botox and could try Nerve Block   This time, we will add Elavil 25mg before sleep for better headache/dizziness prevention      We told the patient not to drive 3 months after any spell of passing out          SIGNATURE:  Kalen Kimball      CC:  Katerine Anne, SHAMA.P.R.N.    1/24/2018  3. INCIDENTALLY NOTED ORIF RIGHT ORBITAL FLOOR BLOWOUT FRACTURE WITH MESH REPAIR.        1/24/2018  INTERPRETATION: EEG by Dr Lau  This is an abnormal video EEG recording in the awake and drowsy state(s), due to excessive fast background activity due to the use of sedatives. There were events captured during and in between photic stimulation, which were NOT epileptic in nature. No seizures captured. Clinical correlation is recommended.     Note:This EEG does not rule out epilepsy.  If the clinical suspicion remains high for seizures, a prolonged recording to capture clinical or subclinical events may be helpful.

## 2018-08-31 NOTE — PATIENT INSTRUCTIONS
IMPRESSION:    1. Newly onset of seizure Jan  2. Worsening migraine,Daily headache since Nov 2017-- every day-- before Botox-- Botox helped, Hx of migraine  3. Newly diagnosed as of Diabetic Mellitus 2017  4. Diabetic Mellitus neuropathy-- under gabapentin  5. Vit D deficency    PLAN/RECOMMENDATIONS:      Continue Depakote ER 500mg two times per day daily ---  Contine Botox and could try Nerve Block   This time, we will add Elavil 25mg before sleep for better headache/dizziness prevention      We told the patient not to drive 3 months after any spell of passing out          SIGNATURE:  Kalen Kimball

## 2018-09-04 ENCOUNTER — APPOINTMENT (OUTPATIENT)
Dept: ENDOCRINOLOGY | Facility: MEDICAL CENTER | Age: 43
End: 2018-09-04
Payer: COMMERCIAL

## 2018-09-07 RX ORDER — PIOGLITAZONEHYDROCHLORIDE 30 MG/1
30 TABLET ORAL DAILY
Qty: 90 TAB | Refills: 3 | Status: SHIPPED | OUTPATIENT
Start: 2018-09-07 | End: 2019-08-30

## 2018-09-07 NOTE — TELEPHONE ENCOUNTER
Was the patient seen in the last year in this department? Yes    Does patient have an active prescription for medications requested? Yes    Received Request Via: Pharmacy     *90 DAY SUPPLY*

## 2018-09-13 ENCOUNTER — TELEPHONE (OUTPATIENT)
Dept: ENDOCRINOLOGY | Facility: MEDICAL CENTER | Age: 43
End: 2018-09-13

## 2018-09-17 ENCOUNTER — OFFICE VISIT (OUTPATIENT)
Dept: ENDOCRINOLOGY | Facility: MEDICAL CENTER | Age: 43
End: 2018-09-17
Payer: COMMERCIAL

## 2018-09-17 VITALS
HEIGHT: 72 IN | OXYGEN SATURATION: 98 % | SYSTOLIC BLOOD PRESSURE: 100 MMHG | WEIGHT: 201.8 LBS | BODY MASS INDEX: 27.33 KG/M2 | DIASTOLIC BLOOD PRESSURE: 74 MMHG | HEART RATE: 126 BPM

## 2018-09-17 DIAGNOSIS — I10 ESSENTIAL HYPERTENSION: ICD-10-CM

## 2018-09-17 PROCEDURE — 99214 OFFICE O/P EST MOD 30 MIN: CPT | Performed by: PHYSICIAN ASSISTANT

## 2018-09-17 RX ORDER — DAPAGLIFLOZIN AND METFORMIN HYDROCHLORIDE 10; 1000 MG/1; MG/1
1 TABLET, FILM COATED, EXTENDED RELEASE ORAL EVERY MORNING
Qty: 30 TAB | Refills: 11 | Status: SHIPPED | OUTPATIENT
Start: 2018-09-17 | End: 2019-09-29 | Stop reason: SDUPTHER

## 2018-09-17 NOTE — PATIENT INSTRUCTIONS
Now on:  1.  Xigduo 10/1000 one in the AM   2.  Trulicity 1.5 qweekly  3.  Actos 30mg once a day   4.  Tresiba 10 units at night before bed (STOP)

## 2018-09-17 NOTE — PROGRESS NOTES
Return to office Patient Consult Note  Referred by: NHAN Oshea    Reason for consult: Diabetes Management Type 2    HPI:  Ivan Prescott is a 43 y.o. old patient who is seeing us today for diabetes care.  This is a pleasant patient with diabetes and I appreciate the opportunity to participate in the care of this patient.    BG Diary:9/17/2018  In the AM: no log today    Labs of 9/17/18 HbA1c is 6.8   Labs of 8/13/18 HbA1c is 8.7  Labs of 3/15/18 HbA1c is 10.5, GFR >60  Labs of 4/12/18 HbA1c was 12.8     BG Diary:8/13/2018  In the AM: 85, 66, 76, 66, 100, 88, 100, 90, 75, 111  Before Bed: 105, 1:15, 110, 111, 100, 15, 120      BG Diary:7/12/2018  In the AM: 121, 109, 115, 140, 119, 131     BG Diary:6/12/2018  In the AM: 195, 200, 195, 147, 143, 238, 185, 270  Before Bed: 155, 190, 200, 185, 190, 235, 210      Weight: on 5/3/18 he is 201 on 4/12/18 he was 202      1. Uncontrolled type 2 diabetes mellitus with diabetic neuropathy, without long-term current use of insulin (HCC)  This is a new patient with me on 4/12/18  He was on:  1.   Metformin 1000mg     STOP:  Metformin     Now on:  1.  Xigduo 10/1000 one in the AM   2.  Trulicity 1.5 qweekly  3.  Actos 30mg once a day   4.  Tresiba 10 units at night before bed     2. Essential hypertension  This is stable and no changes needed       ROS:   Constitutional: No night sweats.  Eyes:  No visual changes.  Cardiac: No chest pain, No palpitations or racing heart rate.  Resp: No shortness of breath, No cough,   Gi: No Diarrhea    All other systems were reviewed and were/are negative.  The ROS was revised/revisited during this office visit from the patients first office visit with me on 4/12/18 Please review the full ROS during the first office visit.    Past Medical History:  Patient Active Problem List    Diagnosis Date Noted   • Headache 01/25/2018     Priority: High   • DM (diabetes mellitus) (HCC) 01/24/2018     Priority: Medium   • Essential  hypertension 04/19/2017     Priority: Medium   • Chronic migraine 07/03/2018   • Vitamin deficiency 04/03/2018   • Neuropathy (HCC) 04/03/2018   • Seizure cerebral 04/03/2018   • Restlessness and agitation 03/15/2018   • Blurry vision, bilateral 03/15/2018   • Uncontrolled type 2 diabetes mellitus with diabetic neuropathy, without long-term current use of insulin ([facility]) 04/19/2017   • Pain in both feet 03/22/2017   • Erectile dysfunction 03/22/2017   • Chest skin lesion 03/22/2017   • Hx of migraines 03/22/2017       Past Surgical History:  Past Surgical History:   Procedure Laterality Date   • EYE SURGERY     • KNEE ARTHROTOMY      plica removal 2016       Allergies:  Acetaminophen    Social History:  Social History     Social History   • Marital status:      Spouse name: N/A   • Number of children: N/A   • Years of education: N/A     Occupational History   • Not on file.     Social History Main Topics   • Smoking status: Never Smoker   • Smokeless tobacco: Current User     Types: Chew   • Alcohol use No   • Drug use: No   • Sexual activity: Yes     Partners: Female     Other Topics Concern   • Not on file     Social History Narrative   • No narrative on file       Family History:  Family History   Problem Relation Age of Onset   • No Known Problems Mother    • No Known Problems Father    • No Known Problems Sister    • Diabetes Paternal Grandmother        Medications:    Current Outpatient Prescriptions:   •  pioglitazone (ACTOS) 30 MG Tab, Take 1 Tab by mouth every day., Disp: 90 Tab, Rfl: 3  •  divalproex ER (DEPAKOTE ER) 500 MG TABLET SR 24 HR, Take 1 Tab by mouth 2 Times a Day., Disp: 180 Tab, Rfl: 1  •  amitriptyline (ELAVIL) 25 MG Tab, Take 1 Tab by mouth every bedtime., Disp: 30 Tab, Rfl: 4  •  Dapagliflozin-Metformin HCl ER (XIGDUO XR)  MG TABLET SR 24 HR, Take 1 Tab by mouth every morning., Disp: 30 Tab, Rfl: 11  •  Insulin Degludec (TRESIBA FLEXTOUCH) 200 UNIT/ML Solution Pen-injector,  "Inject 50 Units as instructed every bedtime., Disp: 3 PEN, Rfl: 2  •  Dulaglutide (TRULICITY) 1.5 MG/0.5ML Solution Pen-injector, Inject 0.5 mL as instructed every 7 days., Disp: 4 PEN, Rfl: 6  •  gabapentin (NEURONTIN) 600 MG tablet, Take 2 Tabs by mouth 3 times a day., Disp: 180 Tab, Rfl: 6  •  Lancets Misc, Lancets order: Lancets for Abbott Precision xtra meter. Sig: use am and prn ssx high or low sugar., Disp: 100 Each, Rfl: 3  •  Canagliflozin-Metformin HCl -1000 MG TABLET SR 24 HR, Take 1 tablet by mouth 2 Times a Day., Disp: 60 Tab, Rfl: 11  •  BOTOX 200 units Recon Soln, , Disp: , Rfl:   •  SUMAtriptan (IMITREX) 50 MG Tab, Take 1 Tab by mouth Once PRN for Migraine., Disp: 12 Tab, Rfl: 6  •  SUMAtriptan Succinate 4 MG/0.5ML Solution Auto-injector, Inject 1 Application as instructed as needed., Disp: 0.5 mL, Rfl: 6  •  ondansetron (ZOFRAN ODT) 4 MG TABLET DISPERSIBLE, Take 1 Tab by mouth every four hours as needed (give PO if no IV route available)., Disp: 30 Tab, Rfl: 0        Physical Examination:   Vital signs: /74   Pulse (!) 126   Ht 1.829 m (6' 0.01\")   Wt 91.5 kg (201 lb 12.8 oz)   SpO2 98%   BMI 27.36 kg/m²   General: No distress, cooperative, well dressed and well nourished.   Eyes: No scleral icterus or discharge, No hyposphagma  ENMT: Normal on external inspection of nose, lips, No nasal drainage   Neck: No abnormal masses on inspection  Resp: Normal effort, Bilateral clear to auscultation, No wheezing, No rales  CVS: Regular rate and rhythm, S1 S2 normal, No murmur. No gallop  Extremities: No edema bilateral extremities  Neuro: Alert and oriented  Skin: No rash, No Ulcers  Psych: Normal mood and affect      Assessment and Plan:    1. Uncontrolled type 2 diabetes mellitus with diabetic neuropathy, without long-term current use of insulin (HCC)  Now on:  1.  Xigduo 10/1000 one in the AM   2.  Trulicity 1.5 qweekly  3.  Actos 30mg once a day   4.  Tresiba 10 units at night before bed " (STOP)    2. Essential hypertension  This is stable and no changes need to be made    Return in about 3 months (around 12/17/2018).    Blood glucose log: Check BG in the morning when wake up, before lunch or dinner and before bed.  So three times a day.  Always bring BG diary to the next office visit.     Thank you kindly for allowing me to participate in the diabetes care plan for this patient.    Alton Morales PA-C, BC-[facility]  Board Certified - Advanced Diabetes Management  09/17/18    CC:   NHAN Oshea

## 2018-09-25 ENCOUNTER — OFFICE VISIT (OUTPATIENT)
Dept: NEUROLOGY | Facility: MEDICAL CENTER | Age: 43
End: 2018-09-25
Payer: COMMERCIAL

## 2018-09-25 VITALS
OXYGEN SATURATION: 98 % | HEIGHT: 72 IN | BODY MASS INDEX: 27.5 KG/M2 | WEIGHT: 203 LBS | DIASTOLIC BLOOD PRESSURE: 78 MMHG | SYSTOLIC BLOOD PRESSURE: 120 MMHG | TEMPERATURE: 98.9 F | HEART RATE: 78 BPM

## 2018-09-25 PROCEDURE — 64615 CHEMODENERV MUSC MIGRAINE: CPT | Performed by: PHYSICIAN ASSISTANT

## 2018-09-25 NOTE — PROGRESS NOTES
Established pt of Dr. Kimball here today for me to administer botox.    treated this patient in clinic today with BotoxA 155 units according to the dosing/injection paradigm currently mandated by the FDA for the management of chronic migraine. Specifically, I injected 5 units to the procerus, 5 units to the corrugators bilaterally, a total of 20 units to the frontalis musculature, 20 units to the temporalis bilaterally, 15 units to the occipitalis bilaterally, 10 units to the cervical paraspinals bilaterally and 15 units to the trapezius musculature bilaterally.     Pt was advised of side effects associated with medication.     The remainder of the Botox 200 units mixed but not administered was discarded as wastage per FDA guidelines    As this is only his second botox administration it is too difficult to determine how much benefit he will receive from medication but in general he does feel it is helping him already.

## 2018-09-27 ENCOUNTER — TELEPHONE (OUTPATIENT)
Dept: NEUROLOGY | Facility: MEDICAL CENTER | Age: 43
End: 2018-09-27

## 2018-10-01 RX ORDER — GABAPENTIN 600 MG/1
1200 TABLET ORAL 3 TIMES DAILY
Qty: 180 TAB | Refills: 6 | Status: SHIPPED | OUTPATIENT
Start: 2018-10-01 | End: 2019-04-28 | Stop reason: SDUPTHER

## 2018-10-01 NOTE — TELEPHONE ENCOUNTER
Requested Prescriptions     Signed Prescriptions Disp Refills   • gabapentin (NEURONTIN) 600 MG tablet 180 Tab 6     Sig: TAKE 2 TABS BY MOUTH 3 TIMES A DAY.     Authorizing Provider: EFRAIN MILLER A.P.R.N.

## 2018-10-04 ENCOUNTER — OFFICE VISIT (OUTPATIENT)
Dept: URGENT CARE | Facility: PHYSICIAN GROUP | Age: 43
End: 2018-10-04
Payer: COMMERCIAL

## 2018-10-04 VITALS
TEMPERATURE: 98.1 F | WEIGHT: 203 LBS | HEIGHT: 73 IN | RESPIRATION RATE: 14 BRPM | BODY MASS INDEX: 26.9 KG/M2 | OXYGEN SATURATION: 100 % | DIASTOLIC BLOOD PRESSURE: 80 MMHG | HEART RATE: 111 BPM | SYSTOLIC BLOOD PRESSURE: 120 MMHG

## 2018-10-04 DIAGNOSIS — J01.00 ACUTE NON-RECURRENT MAXILLARY SINUSITIS: ICD-10-CM

## 2018-10-04 PROCEDURE — 99214 OFFICE O/P EST MOD 30 MIN: CPT | Performed by: PHYSICIAN ASSISTANT

## 2018-10-04 RX ORDER — AMOXICILLIN AND CLAVULANATE POTASSIUM 875; 125 MG/1; MG/1
1 TABLET, FILM COATED ORAL 2 TIMES DAILY
Qty: 14 TAB | Refills: 0 | Status: SHIPPED | OUTPATIENT
Start: 2018-10-04 | End: 2018-10-11

## 2018-10-04 ASSESSMENT — ENCOUNTER SYMPTOMS
MUSCULOSKELETAL NEGATIVE: 1
FEVER: 0
DIARRHEA: 0
SORE THROAT: 0
CHILLS: 0
NECK PAIN: 0
SHORTNESS OF BREATH: 0
COUGH: 0
NAUSEA: 0
SINUS PAIN: 1
DIZZINESS: 0
ABDOMINAL PAIN: 0
VOMITING: 0
SINUS PRESSURE: 1
SPUTUM PRODUCTION: 0

## 2018-10-23 RX ORDER — TOPIRAMATE 50 MG/1
TABLET, FILM COATED ORAL
Qty: 90 TAB | Refills: 1 | Status: SHIPPED | OUTPATIENT
Start: 2018-10-23 | End: 2019-08-30

## 2018-12-20 ENCOUNTER — APPOINTMENT (OUTPATIENT)
Dept: NEUROLOGY | Facility: MEDICAL CENTER | Age: 43
End: 2018-12-20
Payer: COMMERCIAL

## 2019-01-29 ENCOUNTER — OFFICE VISIT (OUTPATIENT)
Dept: MEDICAL GROUP | Facility: PHYSICIAN GROUP | Age: 44
End: 2019-01-29
Payer: COMMERCIAL

## 2019-01-29 VITALS
SYSTOLIC BLOOD PRESSURE: 110 MMHG | BODY MASS INDEX: 28.63 KG/M2 | TEMPERATURE: 98.8 F | HEART RATE: 92 BPM | WEIGHT: 216 LBS | OXYGEN SATURATION: 100 % | DIASTOLIC BLOOD PRESSURE: 72 MMHG | RESPIRATION RATE: 14 BRPM | HEIGHT: 73 IN

## 2019-01-29 DIAGNOSIS — F51.01 PRIMARY INSOMNIA: ICD-10-CM

## 2019-01-29 DIAGNOSIS — E11.40 TYPE 2 DIABETES MELLITUS WITH DIABETIC NEUROPATHY, WITHOUT LONG-TERM CURRENT USE OF INSULIN (HCC): ICD-10-CM

## 2019-01-29 DIAGNOSIS — G62.9 NEUROPATHY: ICD-10-CM

## 2019-01-29 PROCEDURE — 99214 OFFICE O/P EST MOD 30 MIN: CPT | Performed by: NURSE PRACTITIONER

## 2019-01-29 RX ORDER — TADALAFIL 10 MG/1
10 TABLET ORAL PRN
Qty: 10 TAB | Refills: 0 | Status: SHIPPED | OUTPATIENT
Start: 2019-01-29 | End: 2019-08-30

## 2019-01-29 ASSESSMENT — PATIENT HEALTH QUESTIONNAIRE - PHQ9: CLINICAL INTERPRETATION OF PHQ2 SCORE: 0

## 2019-01-29 NOTE — ASSESSMENT & PLAN NOTE
Reports that over the last two months having insomnia.  No difficulty falling asleep, but has difficulty falling asleep.  Waking up with some burning in legs. No increased stressors.  Taking gabapentin tid, amitriptyline 25 mg at night. Feels groggy during the day.  Reports some snoring.

## 2019-01-30 NOTE — PROGRESS NOTES
Chief Complaint   Patient presents with   • Insomnia       Subjective:   Ivan Prescott is a 43 y.o. male here today for evaluation and management of:    Primary insomnia  Reports that over the last two months having insomnia.  No difficulty falling asleep, but has difficulty falling asleep.  Waking up with some burning in legs. No increased stressors.  Taking gabapentin tid, amitriptyline 25 mg at night. Feels groggy during the day.  Reports some snoring.      Uncontrolled type 2 diabetes mellitus with diabetic neuropathy, without long-term current use of insulin (HCC)  A1c in control.  Seeing endocrinology. Reports ongoing neuropathy and erectile dysfunction.     Neuropathy (HCC)  Continues with gabapentin.  Having some burning breakthrough as dose wears off.             Current medicines (including changes today)  Current Outpatient Prescriptions   Medication Sig Dispense Refill   • tadalafil (CIALIS) 10 MG tablet Take 1 Tab by mouth as needed for Erectile Dysfunction. 10 Tab 0   • gabapentin (NEURONTIN) 600 MG tablet TAKE 2 TABS BY MOUTH 3 TIMES A DAY. 180 Tab 6   • Dapagliflozin-Metformin HCl ER (XIGDUO XR)  MG TABLET SR 24 HR Take 1 Tab by mouth every morning. 30 Tab 11   • Dulaglutide (TRULICITY) 1.5 MG/0.5ML Solution Pen-injector Inject 0.5 mL as instructed every 7 days. 4 PEN 6   • pioglitazone (ACTOS) 30 MG Tab Take 1 Tab by mouth every day. 90 Tab 3   • divalproex ER (DEPAKOTE ER) 500 MG TABLET SR 24 HR Take 1 Tab by mouth 2 Times a Day. 180 Tab 1   • amitriptyline (ELAVIL) 25 MG Tab TAKE 1 TABLET BY MOUTH EVERYDAY AT BEDTIME 90 Tab 1   • topiramate (TOPAMAX) 50 MG tablet TAKE 1 TABLET BY MOUTH AT BEDTIME (Patient not taking: Reported on 1/29/2019) 90 Tab 1   • BOTOX 200 units Recon Soln      • Insulin Degludec (TRESIBA FLEXTOUCH) 200 UNIT/ML Solution Pen-injector Inject 50 Units as instructed every bedtime. 3 PEN 2   • Lancets Misc Lancets order: Lancets for Abbott Precision xtra meter. Sig: use  "am and prn ssx high or low sugar. 100 Each 3   • SUMAtriptan (IMITREX) 50 MG Tab Take 1 Tab by mouth Once PRN for Migraine. 12 Tab 6   • SUMAtriptan Succinate 4 MG/0.5ML Solution Auto-injector Inject 1 Application as instructed as needed. 0.5 mL 6   • ondansetron (ZOFRAN ODT) 4 MG TABLET DISPERSIBLE Take 1 Tab by mouth every four hours as needed (give PO if no IV route available). 30 Tab 0     No current facility-administered medications for this visit.      He  has a past medical history of Allergy; Anxiety; Diabetes (HCC); Head ache; Hypertension; and Migraines.    ROS as stated in hpi  No chest pain, no shortness of breath, no abdominal pain       Objective:     Blood pressure 110/72, pulse 92, temperature 37.1 °C (98.8 °F), temperature source Temporal, resp. rate 14, height 1.848 m (6' 0.75\"), weight 98 kg (216 lb), SpO2 100 %. Body mass index is 28.69 kg/m². stable.   Physical Exam:  Constitutional: Alert, no distress.  Skin: Warm, dry, good turgor,no cyanosis, no rashes in visible areas.  Eye: Equal, round and reactive, conjunctiva clear, lids normal.  Ears: No tenderness, no discharge.  External canals are without any significant edema or erythema.  Gross auditory acuity is intact.  Nose: symmetrical without tenderness, no discharge.  Mouth/Throat: lips without lesion.  Oropharynx clear.    Neck: Trachea midline, no masses, no obvious thyroid enlargement... Range of motion within normal limits.  Neuro: Cranial nerves 2-12 grossly intact.  No sensory deficit.  Respiratory: Unlabored respiratory effort, lungs clear to auscultation, no wheezes, no ronchi.  Cardiovascular: Normal S1, S2, no murmur, no edema.  Abdomen: Soft, non-tender, no masses, no guarding,  no hepatosplenomegaly.  Psych: Alert and oriented x3, normal affect and mood and judgement.        Assessment and Plan:   The following treatment plan was discussed    1. Primary insomnia  This is a new problem to me.  Chronic, worsening.  Will order sleeep " study due to reports of snoring and not feeling rested.  Rule out SHANNON.   Labs ordered, moonitor and follow   - REFERRAL TO SLEEP STUDIES  - COMP METABOLIC PANEL; Future  - CBC WITH DIFFERENTIAL; Future  - TESTOSTERONE SERUM; Future    2. Type 2 diabetes mellitus with diabetic neuropathy, without long-term current use of insulin (HCC)  Chronic, ongoing. Improved.  Followed by endocrinology.  Requesting referral to [employer] [facility] as it is closer to his work.  Referral placed.  Continue current meds, diet and exercise.   - REFERRAL TO ENDOCRINOLOGY  - Lipid Profile; Future    3. Uncontrolled type 2 diabetes mellitus with diabetic neuropathy, without long-term current use of insulin (HCC)  See #2      Followup: Return if symptoms worsen or fail to improve.         Educated in proper administration of medication(s) ordered today including safety, possible SE, risks, benefits, rationale and alternatives to therapy.     Please note that this dictation was created using voice recognition software. I have made every reasonable attempt to correct obvious errors, but I expect that there are errors of grammar and possibly content that I did not discover before finalizing the note.

## 2019-02-02 ENCOUNTER — HOSPITAL ENCOUNTER (OUTPATIENT)
Dept: LAB | Facility: MEDICAL CENTER | Age: 44
End: 2019-02-02
Attending: NURSE PRACTITIONER
Payer: COMMERCIAL

## 2019-02-02 DIAGNOSIS — F51.01 PRIMARY INSOMNIA: ICD-10-CM

## 2019-02-02 DIAGNOSIS — E11.40 TYPE 2 DIABETES MELLITUS WITH DIABETIC NEUROPATHY, WITHOUT LONG-TERM CURRENT USE OF INSULIN (HCC): ICD-10-CM

## 2019-02-02 LAB
ALBUMIN SERPL BCP-MCNC: 4.3 G/DL (ref 3.2–4.9)
ALBUMIN/GLOB SERPL: 1.6 G/DL
ALP SERPL-CCNC: 75 U/L (ref 30–99)
ALT SERPL-CCNC: 32 U/L (ref 2–50)
ANION GAP SERPL CALC-SCNC: 7 MMOL/L (ref 0–11.9)
AST SERPL-CCNC: 19 U/L (ref 12–45)
BASOPHILS # BLD AUTO: 1 % (ref 0–1.8)
BASOPHILS # BLD: 0.05 K/UL (ref 0–0.12)
BILIRUB SERPL-MCNC: 0.4 MG/DL (ref 0.1–1.5)
BUN SERPL-MCNC: 20 MG/DL (ref 8–22)
CALCIUM SERPL-MCNC: 9.5 MG/DL (ref 8.5–10.5)
CHLORIDE SERPL-SCNC: 107 MMOL/L (ref 96–112)
CHOLEST SERPL-MCNC: 185 MG/DL (ref 100–199)
CO2 SERPL-SCNC: 26 MMOL/L (ref 20–33)
CREAT SERPL-MCNC: 1.09 MG/DL (ref 0.5–1.4)
EOSINOPHIL # BLD AUTO: 0.18 K/UL (ref 0–0.51)
EOSINOPHIL NFR BLD: 3.6 % (ref 0–6.9)
ERYTHROCYTE [DISTWIDTH] IN BLOOD BY AUTOMATED COUNT: 41.3 FL (ref 35.9–50)
FASTING STATUS PATIENT QL REPORTED: NORMAL
GLOBULIN SER CALC-MCNC: 2.7 G/DL (ref 1.9–3.5)
GLUCOSE SERPL-MCNC: 311 MG/DL (ref 65–99)
HCT VFR BLD AUTO: 53.6 % (ref 42–52)
HDLC SERPL-MCNC: 45 MG/DL
HGB BLD-MCNC: 17.4 G/DL (ref 14–18)
IMM GRANULOCYTES # BLD AUTO: 0.01 K/UL (ref 0–0.11)
IMM GRANULOCYTES NFR BLD AUTO: 0.2 % (ref 0–0.9)
LDLC SERPL CALC-MCNC: 107 MG/DL
LYMPHOCYTES # BLD AUTO: 1.9 K/UL (ref 1–4.8)
LYMPHOCYTES NFR BLD: 38 % (ref 22–41)
MCH RBC QN AUTO: 29.6 PG (ref 27–33)
MCHC RBC AUTO-ENTMCNC: 32.5 G/DL (ref 33.7–35.3)
MCV RBC AUTO: 91.2 FL (ref 81.4–97.8)
MONOCYTES # BLD AUTO: 0.66 K/UL (ref 0–0.85)
MONOCYTES NFR BLD AUTO: 13.2 % (ref 0–13.4)
NEUTROPHILS # BLD AUTO: 2.2 K/UL (ref 1.82–7.42)
NEUTROPHILS NFR BLD: 44 % (ref 44–72)
NRBC # BLD AUTO: 0 K/UL
NRBC BLD-RTO: 0 /100 WBC
PLATELET # BLD AUTO: 232 K/UL (ref 164–446)
PMV BLD AUTO: 9.6 FL (ref 9–12.9)
POTASSIUM SERPL-SCNC: 4.4 MMOL/L (ref 3.6–5.5)
PROT SERPL-MCNC: 7 G/DL (ref 6–8.2)
RBC # BLD AUTO: 5.88 M/UL (ref 4.7–6.1)
SODIUM SERPL-SCNC: 140 MMOL/L (ref 135–145)
TESTOST SERPL-MCNC: 305 NG/DL (ref 175–781)
TRIGL SERPL-MCNC: 167 MG/DL (ref 0–149)
WBC # BLD AUTO: 5 K/UL (ref 4.8–10.8)

## 2019-02-02 PROCEDURE — 80053 COMPREHEN METABOLIC PANEL: CPT

## 2019-02-02 PROCEDURE — 36415 COLL VENOUS BLD VENIPUNCTURE: CPT

## 2019-02-02 PROCEDURE — 84403 ASSAY OF TOTAL TESTOSTERONE: CPT

## 2019-02-02 PROCEDURE — 80061 LIPID PANEL: CPT

## 2019-02-02 PROCEDURE — 85025 COMPLETE CBC W/AUTO DIFF WBC: CPT

## 2019-02-20 ENCOUNTER — OFFICE VISIT (OUTPATIENT)
Dept: MEDICAL GROUP | Facility: PHYSICIAN GROUP | Age: 44
End: 2019-02-20
Payer: COMMERCIAL

## 2019-02-20 VITALS
DIASTOLIC BLOOD PRESSURE: 80 MMHG | WEIGHT: 215 LBS | TEMPERATURE: 97.9 F | HEART RATE: 94 BPM | HEIGHT: 73 IN | RESPIRATION RATE: 14 BRPM | SYSTOLIC BLOOD PRESSURE: 100 MMHG | BODY MASS INDEX: 28.49 KG/M2 | OXYGEN SATURATION: 98 %

## 2019-02-20 DIAGNOSIS — H92.01 RIGHT EAR PAIN: ICD-10-CM

## 2019-02-20 PROCEDURE — 99214 OFFICE O/P EST MOD 30 MIN: CPT | Performed by: NURSE PRACTITIONER

## 2019-02-20 RX ORDER — DOXYCYCLINE HYCLATE 100 MG
100 TABLET ORAL 2 TIMES DAILY
Qty: 20 TAB | Refills: 0 | Status: SHIPPED | OUTPATIENT
Start: 2019-02-20 | End: 2019-08-30

## 2019-02-20 RX ORDER — DEXAMETHASONE 4 MG/1
2 TABLET ORAL 2 TIMES DAILY
Qty: 10 TAB | Refills: 0 | Status: SHIPPED | OUTPATIENT
Start: 2019-02-20 | End: 2019-08-30

## 2019-02-20 NOTE — ASSESSMENT & PLAN NOTE
Reports 4 days of right ear pain, sinus pain, pressure and sore throat.  No temperature today.  Taking theraflu. Daytime/night time cold medication.  No cough.  No sinus drainage reported.  Swelling on right side of face.

## 2019-02-20 NOTE — LETTER
February 20, 2019         Patient: Ivan Prescott   YOB: 1975   Date of Visit: 2/20/2019           To Whom it May Concern:    Ivan Prescott was seen in my clinic on 2/20/2019. Please excuse his absence from work today.  He may not return to work until Monday the 25th of February.     If you have any questions or concerns, please don't hesitate to call.        Sincerely,           NHAN Oshea  Electronically Signed

## 2019-02-20 NOTE — PROGRESS NOTES
Chief Complaint   Patient presents with   • Ear Pain     right side   • Sinus Problem   • Pharyngitis       Subjective:   Ivan Prescott is a 43 y.o. male here today for evaluation and management of an acute issue    Right ear pain  Reports 4 days of right ear pain, sinus pain, pressure and sore throat.  No temperature today.  Taking theraflu. Daytime/night time cold medication.  No cough.  No sinus drainage reported.  Swelling on right side of face.          Current medicines (including changes today)  Current Outpatient Prescriptions   Medication Sig Dispense Refill   • doxycycline (VIBRAMYCIN) 100 MG Tab Take 1 Tab by mouth 2 times a day. 20 Tab 0   • dexamethasone (DECADRON) 4 MG Tab Take 0.5 Tabs by mouth 2 times a day. 10 Tab 0   • amitriptyline (ELAVIL) 25 MG Tab TAKE 1 TABLET BY MOUTH EVERYDAY AT BEDTIME 90 Tab 1   • gabapentin (NEURONTIN) 600 MG tablet TAKE 2 TABS BY MOUTH 3 TIMES A DAY. 180 Tab 6   • Dapagliflozin-Metformin HCl ER (XIGDUO XR)  MG TABLET SR 24 HR Take 1 Tab by mouth every morning. 30 Tab 11   • Dulaglutide (TRULICITY) 1.5 MG/0.5ML Solution Pen-injector Inject 0.5 mL as instructed every 7 days. 4 PEN 6   • pioglitazone (ACTOS) 30 MG Tab Take 1 Tab by mouth every day. 90 Tab 3   • divalproex ER (DEPAKOTE ER) 500 MG TABLET SR 24 HR Take 1 Tab by mouth 2 Times a Day. 180 Tab 1   • BOTOX 200 units Recon Soln      • Insulin Degludec (TRESIBA FLEXTOUCH) 200 UNIT/ML Solution Pen-injector Inject 50 Units as instructed every bedtime. 3 PEN 2   • tadalafil (CIALIS) 10 MG tablet Take 1 Tab by mouth as needed for Erectile Dysfunction. 10 Tab 0   • topiramate (TOPAMAX) 50 MG tablet TAKE 1 TABLET BY MOUTH AT BEDTIME (Patient not taking: Reported on 1/29/2019) 90 Tab 1   • Lancets Misc Lancets order: Lancets for Abbott Precision xtra meter. Sig: use am and prn ssx high or low sugar. 100 Each 3   • SUMAtriptan (IMITREX) 50 MG Tab Take 1 Tab by mouth Once PRN for Migraine. 12 Tab 6   • SUMAtriptan  "Succinate 4 MG/0.5ML Solution Auto-injector Inject 1 Application as instructed as needed. 0.5 mL 6   • ondansetron (ZOFRAN ODT) 4 MG TABLET DISPERSIBLE Take 1 Tab by mouth every four hours as needed (give PO if no IV route available). 30 Tab 0     No current facility-administered medications for this visit.      He  has a past medical history of Allergy; Anxiety; Diabetes (HCC); Head ache; Hypertension; and Migraines.    ROS as stated in hpi  No chest pain, no shortness of breath, no abdominal pain       Objective:     Blood pressure 100/80, pulse 94, temperature 36.6 °C (97.9 °F), resp. rate 14, height 1.854 m (6' 1\"), weight 97.5 kg (215 lb), SpO2 98 %. Body mass index is 28.37 kg/m². afebrile  Physical Exam:  Constitutional: Alert, no distress.  Skin: Warm, dry, good turgor,no cyanosis, no rashes in visible areas.  Eye: Equal, round and reactive, conjunctiva clear, lids normal.  Ears: + right side tenderness, no discharge.  External canals are without any significant edema or erythema.  Tympanic membranes are without any inflammation, no effusion.  Gross auditory acuity is intact.  Nose: symmetrical without tenderness, reddened nasal passages and swelling right greater than left.  + facial tenderness and swelling right side, no orbital swelling noted.   Mouth/Throat: lips without lesion.  Oropharynx clear.  Throat without erythema, exudates or tonsillar enlargement.  Neck: Trachea midline, no masses, no obvious thyroid enlargement.. No cervical or supraclavicular lymphadenopathy. Range of motion within normal limits.  Neuro: Cranial nerves 2-12 grossly intact.  No sensory deficit.  Respiratory: Unlabored respiratory effort, lungs clear to auscultation, no wheezes, no ronchi.  Cardiovascular: Normal S1, S2, no murmur, no edema.  Psych: Alert and oriented x3, normal affect and mood and judgement.        Assessment and Plan:   The following treatment plan was discussed    1. Right ear pain  This is a new problem to " me  Acute:  Right side sinusitis.  Doxycycline 100 mg bid X10 days.  Dexamethasone 4 mg bid X5 days.  Discussed that he may see blood sugars elevate during this time.  Recommended fluids, rest, over the counter cold medication to relieve pressure.  Note for work given.  Red flag warnings reviewed.  Monitor and follow.       Followup: Return if symptoms worsen or fail to improve.         Educated in proper administration of medication(s) ordered today including safety, possible SE, risks, benefits, rationale and alternatives to therapy.     Please note that this dictation was created using voice recognition software. I have made every reasonable attempt to correct obvious errors, but I expect that there are errors of grammar and possibly content that I did not discover before finalizing the note.

## 2019-02-25 ENCOUNTER — TELEPHONE (OUTPATIENT)
Dept: MEDICAL GROUP | Facility: PHYSICIAN GROUP | Age: 44
End: 2019-02-25

## 2019-02-25 NOTE — TELEPHONE ENCOUNTER
1. Caller Name: Ivan Prescott                                         Call Back Number: [phone] (home)       Patient approves a detailed voicemail message: N\A  Leobardo called stated that since visit on 02/20/19 he has finished the dexamethasone still taking the doxycycline but has no improvement and now has moved into his throat and effect his voice. Pt states the pain is gone but his face is still swollen and no drainage.  Please advise

## 2019-02-26 RX ORDER — AMOXICILLIN AND CLAVULANATE POTASSIUM 875; 125 MG/1; MG/1
1 TABLET, FILM COATED ORAL 2 TIMES DAILY
Qty: 14 TAB | Refills: 0 | Status: SHIPPED | OUTPATIENT
Start: 2019-02-26 | End: 2019-08-30

## 2019-02-26 NOTE — TELEPHONE ENCOUNTER
Phone Number Called:[phone] (home)       Message: Pt. Notified.      Left Message for patient to call back: N\A

## 2019-02-26 NOTE — TELEPHONE ENCOUNTER
Pt stated he been taking thera flu day and night since started this virus still have symptom not helping loss of appetite played basket to try and sweat didn't even sweat just flat out feel miserable.   Please advise

## 2019-02-26 NOTE — TELEPHONE ENCOUNTER
I would definitely recommend an overt the counter cold medication (dayquil/nyquil combination) to help with the drainage.  There is a definite bug going around with these symptoms.  Lots of fluids and rest.  ODILON Oshea.

## 2019-02-26 NOTE — TELEPHONE ENCOUNTER
Please let patient know that I am calling over a different family of antibiotic to help with facial swelling: Augmentin.  Please take with food until complete.  If no improvement in the next 4-5 days, needs to be seen.  ODILON Oshea.

## 2019-04-29 RX ORDER — GABAPENTIN 600 MG/1
TABLET ORAL
Qty: 180 TAB | Refills: 0 | Status: SHIPPED | OUTPATIENT
Start: 2019-04-29 | End: 2019-05-30 | Stop reason: SDUPTHER

## 2019-04-29 NOTE — TELEPHONE ENCOUNTER
Requested Prescriptions     Pending Prescriptions Disp Refills   • gabapentin (NEURONTIN) 600 MG tablet [Pharmacy Med Name: GABAPENTIN 600 MG TABLET] 180 Tab 0     Sig: TAKE 2 TABLETS BY MOUTH 3 TIMES A DAY       ODILON Taylor.

## 2019-05-17 ENCOUNTER — HOSPITAL ENCOUNTER (OUTPATIENT)
Dept: LAB | Facility: MEDICAL CENTER | Age: 44
End: 2019-05-17
Attending: INTERNAL MEDICINE
Payer: COMMERCIAL

## 2019-05-17 LAB — GLUCOSE SERPL-MCNC: 326 MG/DL (ref 65–99)

## 2019-05-17 PROCEDURE — 84681 ASSAY OF C-PEPTIDE: CPT

## 2019-05-17 PROCEDURE — 82947 ASSAY GLUCOSE BLOOD QUANT: CPT

## 2019-05-17 PROCEDURE — 36415 COLL VENOUS BLD VENIPUNCTURE: CPT

## 2019-05-21 LAB — C PEPTIDE SERPL-MCNC: 4.8 NG/ML (ref 0.8–3.5)

## 2019-05-21 RX ORDER — SUMATRIPTAN 50 MG/1
TABLET, FILM COATED ORAL
Qty: 12 TAB | Refills: 2 | Status: SHIPPED | OUTPATIENT
Start: 2019-05-21 | End: 2019-08-30 | Stop reason: SDUPTHER

## 2019-05-21 NOTE — TELEPHONE ENCOUNTER
Requested Prescriptions     Signed Prescriptions Disp Refills   • SUMAtriptan (IMITREX) 50 MG Tab 12 Tab 2     Sig: TAKE 1 TAB BY MOUTH ONCE AS NEEDED FOR MIGRAINE.     Authorizing Provider: EFRAIN MILLER A.P.R.N.

## 2019-05-28 DIAGNOSIS — G43.709 CHRONIC MIGRAINE W/O AURA W/O STATUS MIGRAINOSUS, NOT INTRACTABLE: ICD-10-CM

## 2019-05-30 DIAGNOSIS — E11.40 CONTROLLED TYPE 2 DIABETES WITH NEUROPATHY (HCC): ICD-10-CM

## 2019-05-30 RX ORDER — GABAPENTIN 600 MG/1
TABLET ORAL
Qty: 180 TAB | Refills: 3 | Status: SHIPPED | OUTPATIENT
Start: 2019-05-30 | End: 2019-08-28 | Stop reason: SDUPTHER

## 2019-05-30 NOTE — TELEPHONE ENCOUNTER
Requested Prescriptions     Signed Prescriptions Disp Refills   • gabapentin (NEURONTIN) 600 MG tablet 180 Tab 3     Sig: TAKE 2 TABLETS BY MOUTH 3 TIMES A DAY     Authorizing Provider: EFRAIN MILLER A.P.R.N.

## 2019-05-30 NOTE — TELEPHONE ENCOUNTER
Was the patient seen in the last year in this department? YesLOV 02/20/19 LABS 02/02/19    Does patient have an active prescription for medications requested? No     Received Request Via: Pharmacy

## 2019-06-18 ENCOUNTER — OFFICE VISIT (OUTPATIENT)
Dept: NEUROLOGY | Facility: MEDICAL CENTER | Age: 44
End: 2019-06-18
Payer: COMMERCIAL

## 2019-06-18 VITALS
HEART RATE: 94 BPM | TEMPERATURE: 97.7 F | OXYGEN SATURATION: 97 % | HEIGHT: 73 IN | WEIGHT: 212.2 LBS | BODY MASS INDEX: 28.12 KG/M2 | SYSTOLIC BLOOD PRESSURE: 110 MMHG | DIASTOLIC BLOOD PRESSURE: 68 MMHG

## 2019-06-18 PROCEDURE — 64615 CHEMODENERV MUSC MIGRAINE: CPT | Performed by: NURSE PRACTITIONER

## 2019-06-18 RX ORDER — KETOROLAC TROMETHAMINE 30 MG/ML
60 INJECTION, SOLUTION INTRAMUSCULAR; INTRAVENOUS ONCE
Status: COMPLETED | OUTPATIENT
Start: 2019-06-18 | End: 2019-06-18

## 2019-06-18 RX ADMIN — KETOROLAC TROMETHAMINE 60 MG: 30 INJECTION, SOLUTION INTRAMUSCULAR; INTRAVENOUS at 08:43

## 2019-06-18 NOTE — PROGRESS NOTES
Subjective:      Ivan Prescott is a 43 y.o. male who presents with No chief complaint on file.            HPI  Here for Botox today.    Current Outpatient Prescriptions   Medication Sig Dispense Refill   • gabapentin (NEURONTIN) 600 MG tablet TAKE 2 TABLETS BY MOUTH 3 TIMES A  Tab 3   • SUMAtriptan (IMITREX) 50 MG Tab TAKE 1 TAB BY MOUTH ONCE AS NEEDED FOR MIGRAINE. 12 Tab 2   • divalproex ER (DEPAKOTE ER) 500 MG TABLET SR 24 HR TAKE 1 TABLET BY MOUTH TWICE A  Tab 1   • amoxicillin-clavulanate (AUGMENTIN) 875-125 MG Tab Take 1 Tab by mouth 2 times a day. 14 Tab 0   • doxycycline (VIBRAMYCIN) 100 MG Tab Take 1 Tab by mouth 2 times a day. 20 Tab 0   • dexamethasone (DECADRON) 4 MG Tab Take 0.5 Tabs by mouth 2 times a day. 10 Tab 0   • amitriptyline (ELAVIL) 25 MG Tab TAKE 1 TABLET BY MOUTH EVERYDAY AT BEDTIME 90 Tab 1   • tadalafil (CIALIS) 10 MG tablet Take 1 Tab by mouth as needed for Erectile Dysfunction. 10 Tab 0   • topiramate (TOPAMAX) 50 MG tablet TAKE 1 TABLET BY MOUTH AT BEDTIME 90 Tab 1   • Dapagliflozin-Metformin HCl ER (XIGDUO XR)  MG TABLET SR 24 HR Take 1 Tab by mouth every morning. 30 Tab 11   • Dulaglutide (TRULICITY) 1.5 MG/0.5ML Solution Pen-injector Inject 0.5 mL as instructed every 7 days. 4 PEN 6   • pioglitazone (ACTOS) 30 MG Tab Take 1 Tab by mouth every day. 90 Tab 3   • BOTOX 200 units Recon Soln      • Insulin Degludec (TRESIBA FLEXTOUCH) 200 UNIT/ML Solution Pen-injector Inject 50 Units as instructed every bedtime. 3 PEN 2   • Lancets Misc Lancets order: Lancets for Abbott Precision xtra meter. Sig: use am and prn ssx high or low sugar. 100 Each 3   • SUMAtriptan Succinate 4 MG/0.5ML Solution Auto-injector Inject 1 Application as instructed as needed. 0.5 mL 6   • ondansetron (ZOFRAN ODT) 4 MG TABLET DISPERSIBLE Take 1 Tab by mouth every four hours as needed (give PO if no IV route available). 30 Tab 0     Current Facility-Administered Medications   Medication Dose  Route Frequency Provider Last Rate Last Dose   • ketorolac (TORADOL) injection 60 mg  60 mg Intramuscular Once Kathairna NAM Zhao, A.P.N.       • onabotulinum toxin type A SOLR 155 Units  155 Units Injection Once Katharinapradip Zhao, A.P.N.             ROS       Objective:     There were no vitals taken for this visit.     Physical Exam            Assessment/Plan:     Chronic Migraine:  Botox therapy has reduced patient’s migraines by more than 7 days and/or 100 hours per month.     I treated this patient in clinic today with BotoxA 155 units according to the dosing/injection paradigm currently mandated by the FDA for the management of chronic migraine. Specifically, I injected 5 units to the procerus, 5 units to the corrugators bilaterally, a total of 20 units to the frontalis musculature, 20 units to the temporalis bilaterally, 15 units to the occipitalis bilaterally, 10 units to the cervical paraspinals bilaterally and 15 units to the trapezius musculature bilaterally. The remainder of the Botox 200 units mixed but not administered was discarded as wastage per FDA guidelines.     Education/counseling/reduction in medications if pt has medication overuse headache; Frequency of headaches is >15 days monthly with at least 8 migraines monthly; Migraines include at least two of the following: worsened with activity or avoidance of activity with migraines (ie they go lie down), moderate to severe pain intensity, pulsing headache, unilateral headache AND they have either nausea or vomiting OR sensitivity to light and sound.     Although this patient is responding to botox they are NOT migraine free, however, and it is my recommendation Botox be continued at this time.    This patient has chronic daily migraines, defined as having 15 or more headaches days per month, 8 of which are migraines, over a minimum of the last three months. Episodes last more than 4 hours (untreated).  Pt has 2 or more of following (see initial  note) -  Headache worsened with activity, pain is moderate to severe intensity, pulsing in nature, unilateral and patient either has nausea/vomiting OR sensitivity to light and sound.  This patient does/does not also have medication overuse headache.  However our plan to address this includes education, occipital nerve shots, restricting use as directed.    Toradol 60mg IM provided per MA.    Return for follow-up in 3 months for infrequent sets of Botox and evaluation of need for Botox.

## 2019-07-18 ENCOUNTER — PATIENT MESSAGE (OUTPATIENT)
Dept: MEDICAL GROUP | Facility: PHYSICIAN GROUP | Age: 44
End: 2019-07-18

## 2019-07-25 RX ORDER — AMITRIPTYLINE HYDROCHLORIDE 25 MG/1
TABLET, FILM COATED ORAL
Qty: 90 TAB | Refills: 1 | Status: SHIPPED | OUTPATIENT
Start: 2019-07-25 | End: 2020-01-22

## 2019-07-25 NOTE — TELEPHONE ENCOUNTER
/Was the patient seen in the last year in this department? Yes Last seen by Dr. Kimball 06/18/19    Does patient have an active prescription for medications requested? No     Received Request Via: Pharmacy      [facility] on 08/29/19

## 2019-08-28 DIAGNOSIS — E11.40 CONTROLLED TYPE 2 DIABETES WITH NEUROPATHY (HCC): ICD-10-CM

## 2019-08-28 RX ORDER — GABAPENTIN 600 MG/1
TABLET ORAL
Qty: 180 TAB | Refills: 3 | Status: SHIPPED | OUTPATIENT
Start: 2019-08-28 | End: 2019-10-14 | Stop reason: SDUPTHER

## 2019-08-30 ENCOUNTER — OFFICE VISIT (OUTPATIENT)
Dept: NEUROLOGY | Facility: MEDICAL CENTER | Age: 44
End: 2019-08-30
Payer: COMMERCIAL

## 2019-08-30 VITALS
OXYGEN SATURATION: 97 % | HEART RATE: 110 BPM | BODY MASS INDEX: 29.72 KG/M2 | DIASTOLIC BLOOD PRESSURE: 78 MMHG | TEMPERATURE: 98.2 F | HEIGHT: 73 IN | WEIGHT: 224.2 LBS | RESPIRATION RATE: 17 BRPM | SYSTOLIC BLOOD PRESSURE: 118 MMHG

## 2019-08-30 DIAGNOSIS — G25.81 RLS (RESTLESS LEGS SYNDROME): ICD-10-CM

## 2019-08-30 DIAGNOSIS — E11.42 DIABETIC POLYNEUROPATHY ASSOCIATED WITH TYPE 2 DIABETES MELLITUS (HCC): ICD-10-CM

## 2019-08-30 DIAGNOSIS — G40.909 SEIZURE CEREBRAL (HCC): ICD-10-CM

## 2019-08-30 PROCEDURE — 99215 OFFICE O/P EST HI 40 MIN: CPT | Performed by: PSYCHIATRY & NEUROLOGY

## 2019-08-30 RX ORDER — NAPROXEN SODIUM 550 MG/1
TABLET ORAL
Qty: 45 TAB | Refills: 1 | Status: SHIPPED | OUTPATIENT
Start: 2019-08-30 | End: 2019-10-14 | Stop reason: SDUPTHER

## 2019-08-30 RX ORDER — SUMATRIPTAN 100 MG/1
TABLET, FILM COATED ORAL
Qty: 9 TAB | Refills: 5 | Status: SHIPPED | OUTPATIENT
Start: 2019-08-30 | End: 2020-02-06 | Stop reason: SDUPTHER

## 2019-08-30 RX ORDER — SUMATRIPTAN SUCCINATE 4 MG/.5ML
1 INJECTION, SOLUTION SUBCUTANEOUS
Qty: 0.5 ML | Refills: 6 | Status: SHIPPED | OUTPATIENT
Start: 2019-08-30 | End: 2020-09-23 | Stop reason: SDUPTHER

## 2019-08-30 ASSESSMENT — PAIN SCALES - GENERAL: PAINLEVEL: 7=MODERATE-SEVERE PAIN

## 2019-08-30 NOTE — LETTER
2019        Re: Ivan Prescott  : 1975      To Whom It May Concern:      Ivan was present for his appointment today from 3:00pm to 4:15pm.   He has a procedure appointment on 09/10/19 at 8:00am.      If there are any questions or concerns please feel free to contact our office. [phone]       Thank you,        Hunter Jones MD    Electronically Signed

## 2019-08-31 NOTE — PROGRESS NOTES
Subjective:      Ivan Prescott is a 44 y.o. male who presents for follow-up, former patient of Dr. Kimball, also treated in the clinic by HOMER Gaytan, with a history of chronic migraine and a painful diabetes-related polyneuropathy with associated restlessness.  History is gotten from discussions with the patient as well as review of the electronic health records dating back to April, 2018.    HPI    Chronic migraine: At the time of treatment, he had just finished suffering through a 5-month duration severe migraine episode, something that was new for him.  Prior to that the headaches were episodic.  Never associated with aura, always incapacitating, GI disturbances were never there but he had notable sensitivities, impaired concentration, etc.  Rarely more than a couple of days in duration, they were infrequent, but were easily triggered by stress, sounds of high volume, etc.    During the time of his headaches, in January, 2018, he suffered from 2 isolated events of seizure activity.  Dr. Kimball ordered a an ambulatory EEG which was normal, he was placed on Depakote 500 mg, twice daily, and has been continued on the drug to this date.  Topamax 50 mg was attempted as well, this for the headaches, the cocktail provided no benefit.    Already on a high dose of gabapentin for his neuropathy (see below), this has not helped the headaches.  Still on Elavil 25 mg daily, higher doses were poorly tolerated.  He has never been on other pharmacologic treatments.  He was placed on Botox, the first injection series provided near complete headache free response, the second less complete, he had one severe migraine intermediate through, but it did leave him with mild to moderate headaches.  He missed 2 injection series because of insurance issues, he was last injected in June of this year, the headaches did improve, but he is still dealing with a moderate headache.    He has a daily moderate headache pain, this is more global  but still incapacitating, not disabling.  Severe headaches occur maybe once a month with Botox.  He takes nothing for these headaches, they are associate with persistent cognitive impairment, heightened sensitivities to sound now also light, he is much more irritable, he often describes a bitemporal tenderness.  He is always best in his mornings, things worsen as the day goes on.    Neuropathy: Diagnosed with his disease about 2 years ago, the neuropathic symptoms occurred about the same time.  He describes burning dysesthesias and paresthesias that have now ascended to about the mid shin.  There are episodic symptoms in the hands.  The symptoms are constant, making it difficult to walk because of the discomfort.  He has a little unsteady but does not fall.  He denies a foot drop or actual weakness.  Placed on gabapentin, now 1200 mg, 3 times daily, he has begun to develop some edema, the benefit for his pain remains but has not improved much.  He is failed Elavil as above, he is not sure of Lyrica was tried.    He also describes a restlessness in the legs that is separate from the pain in the feet, always worsened into the evenings, especially when going to sleep, it improves though temporarily if he stands and walks, rubs the legs, etc.  It remains in the legs only, it has not ascended into the chest or upper extremities.  He can be aware of symptoms nowadays into the early afternoons if he is seated for long enough.  Obviously, the symptoms have not been helped with his gabapentin.  He has never been on dopamine agonists.    Medical, surgical and family histories are reviewed in the electronic health record, there are no new drug allergies.  He is on Neurontin 1200 mg, 3 times daily, Imitrex 50 mg tablets as needed, and Imitrex 4 mg injections as needed, Depakote  mg, twice daily, Tresiba insulin, and the rest as per the electronic health record, noncontributory from my standpoint.    ROS       Objective:  "    /78 (BP Location: Right arm, Patient Position: Sitting)   Pulse (!) 110   Temp 36.8 °C (98.2 °F)   Resp 17   Ht 1.854 m (6' 1\")   Wt 101.7 kg (224 lb 3.2 oz)   SpO2 97%   BMI 29.58 kg/m²      Physical Exam    He appears in some mild distress, he is complaining of mild to moderate headache.  His vital signs are stable, pulse is elevated at 110, rhythm is regular.  There is no malar rash or jaw claudication.  The neck is supple.  Cardiac evaluation is unremarkable.    Cognition is intact, fully oriented, there is no aphasia or inattention.    PERRLA/EOMI, facial movements are symmetric, visual fields are grossly full, there is no dysarthria or hypophonia.  There is no bulbar dysfunction.    Musculoskeletal exam reveals no tremor, he moves all 4 extremities symmetrically and easily.    There is no appendicular dystaxia, he stands easily, there is no clear postural instability, station is normal and stride length is maintained with symmetric arm swing.  Fine motor control with the hands is intact.     Assessment/Plan:     1. Chronic migraine  The biggest issue for him, we will continue Botox since it is helping the severe headaches, but a CGRP blocker will also be introduced to provide additional benefit for the moderate headaches that he is also dealing with.  The rationale for this was reviewed and full.  He was reassured that additional maintenance therapies can be taken concurrent with CGRP treatments.  We talked about the latter in terms of efficacy, tolerability, long-term benefits, etc.    I will increase his Imitrex to 100 mg tablets which allow him to function, the injection simply make him too sleepy.  Anaprox  mg will be added to the tablet or injection when used, the combination shown to be more effective than either alone.  Side effects were reviewed.    - Galcanezumab-gnlm (EMGALITY) 120 MG/ML Solution Auto-injector; Inject 1 PEN as instructed Q 4 Weeks.  Dispense: 1 PEN; Refill: " 5  - Galcanezumab-gnlm (EMGALITY) 120 MG/ML Solution Auto-injector; Inject 2 Syringes as instructed Once for 1 dose.  Dispense: 2 PEN; Refill: 0  - SUMAtriptan (IMITREX) 100 MG tablet; 1 tab at headache onset; repeat in1 hour prn  Dispense: 9 Tab; Refill: 5  - naproxen sodium (ANAPROX DS) 550 MG tablet; 1-2 tab headache onset; repeat in 4-6 hours prn.  Dispense: 45 Tab; Refill: 1  - SUMAtriptan Succinate 4 MG/0.5ML Solution Auto-injector; Inject 1 Application as instructed Once PRN for up to 1 dose. Inject once at headache onset; repeat in 1 hour prn  Dispense: 0.5 mL; Refill: 6    2. Diabetic polyneuropathy associated with type 2 diabetes mellitus (HCC)  Also an unpleasant fact of life for this gentleman, gabapentin will be continued, I defer this to his pain specialist, but he was also told that further dose escalation of the drug will not likely provide notable benefit, the return on investment getting smaller.  Other pain modulating medicines can be tried, he is a bit naïve when he comes to other treatments.  This would include other TCAs, as well as other membrane stabilizers such as Lamictal, even mexiletine and Catapres.    3.  RLS  Symptom of the neuropathy, he also could have primary RLS given his risk with a history of migraine headache.  Dopamine agonists are treatment options for him, we will go there when I see him again.  We talked briefly about RLS and how it differs from his neuropathic symptoms, we also talked about the pharmacology and rationale for using medication such as Requip.    4. Seizure  By history and with review of the electronic health record, I am not quite sure what to make of these small seizure events he describes.  For now we can observe, I doubt his risk of recurrence is high, so I would like to get him off Depakote, but we will do so slowly.  He was talked about the risk of recurrence, regardless of how low it is.  Depakote  mg daily will be continued for 3 weeks and then  if he does well, we will discontinue the drug entirely.  He will call the office of his seizure recurs.    Face-to-face time was spent reviewing all of the above at length.  He and I will follow-up in 6 months, phone contact in the interim.    Time: 40 minutes spent face-to-face for exam, review, discussion, and education, of this over 60% of the time spent counseling and coordinating care.

## 2019-09-03 RX ORDER — PIOGLITAZONEHYDROCHLORIDE 30 MG/1
TABLET ORAL
Qty: 90 TAB | Refills: 3 | Status: SHIPPED | OUTPATIENT
Start: 2019-09-03 | End: 2019-09-25

## 2019-09-03 NOTE — TELEPHONE ENCOUNTER
Was the patient seen in the last year in this department? Yes 09/17/18    Does patient have an active prescription for medications requested? No     Received Request Via: Pharmacy     pioglitazone (ACTOS) 30 MG Tab    Sig: TAKE 1 TABLET BY MOUTH EVERY DAY

## 2019-09-17 RX ORDER — DIVALPROEX SODIUM 500 MG/1
TABLET, EXTENDED RELEASE ORAL
Qty: 180 TAB | Refills: 3 | Status: SHIPPED | OUTPATIENT
Start: 2019-09-17 | End: 2019-09-25

## 2019-09-20 ENCOUNTER — TELEPHONE (OUTPATIENT)
Dept: NEUROLOGY | Facility: MEDICAL CENTER | Age: 44
End: 2019-09-20

## 2019-09-21 NOTE — TELEPHONE ENCOUNTER
----- Message from Ga Solares Ass't sent at 9/20/2019  7:41 AM PDT -----  Regarding: Letter for employer Re: Migraines  Patient has suffered a severe migraine for the past 2 days. His employer will not allow him to return to work without a Dr's note for the migraines.  Can we please do a letter for him to his employer so he can return to work. Patient last saw you on 8/30/19.    Your thoughts,  eros

## 2019-09-21 NOTE — TELEPHONE ENCOUNTER
I can dictate a letter, I need to know what exactly needs to be said.  Is it an excuse that he was out on total disability because of the headache, does he need an okay from my standpoint to allow him to return to work, both, neither,...?

## 2019-09-23 NOTE — TELEPHONE ENCOUNTER
Looks like he needs a letter for excusing him from work due to the migraine he had for Thursday and Friday and now Monday. Patient is also asking if we can just have a generic one on file for the future?

## 2019-09-25 ENCOUNTER — OFFICE VISIT (OUTPATIENT)
Dept: MEDICAL GROUP | Facility: PHYSICIAN GROUP | Age: 44
End: 2019-09-25
Payer: COMMERCIAL

## 2019-09-25 VITALS
DIASTOLIC BLOOD PRESSURE: 74 MMHG | OXYGEN SATURATION: 97 % | RESPIRATION RATE: 14 BRPM | TEMPERATURE: 98.9 F | SYSTOLIC BLOOD PRESSURE: 100 MMHG | BODY MASS INDEX: 29.69 KG/M2 | WEIGHT: 224 LBS | HEIGHT: 73 IN | HEART RATE: 107 BPM

## 2019-09-25 DIAGNOSIS — E11.42 DIABETIC POLYNEUROPATHY ASSOCIATED WITH TYPE 2 DIABETES MELLITUS (HCC): ICD-10-CM

## 2019-09-25 DIAGNOSIS — E11.8 TYPE 2 DIABETES MELLITUS WITH COMPLICATION, WITHOUT LONG-TERM CURRENT USE OF INSULIN (HCC): ICD-10-CM

## 2019-09-25 PROCEDURE — 99214 OFFICE O/P EST MOD 30 MIN: CPT | Performed by: NURSE PRACTITIONER

## 2019-09-25 NOTE — ASSESSMENT & PLAN NOTE
Seeing [facility] endocrinology.  Last A1c 6.8.  Not currently on Ace/ARB and statin.  Encouraged patient to discuss with endocrinology.

## 2019-09-25 NOTE — ASSESSMENT & PLAN NOTE
Seeing pain management for this issue.  On gabapentin currently.  Has appointment tomorrow with pain to discuss.

## 2019-09-25 NOTE — PROGRESS NOTES
Chief Complaint   Patient presents with   • Migraine   • Letter for School/Work     FMLA       Subjective:   Ivan Prescott is a 44 y.o. male here today for evaluation and management of:    Chronic migraine  Here for renewal of FMLA paperwork for ongoing migraine headache syndrome. Has started to have some migraines and needs note for work and FMLA update.  Asking for intermittent leave for his migraine. Once a month intermittent.    Has started  emgality with Dr. Fitzgerald.  Is overdue for botox injections.  Sees neuro every 6 months  Reports some increasing neck stiffness.     DM (diabetes mellitus) (CMS-HCC) (HCC)  Seeing [facility] endocrinology.  Last A1c 6.8.  Not currently on Ace/ARB and statin.  Encouraged patient to discuss with endocrinology.      Diabetic polyneuropathy associated with type 2 diabetes mellitus (HCC)  Seeing pain management for this issue.  On gabapentin currently.  Has appointment tomorrow with pain to discuss.            Current medicines (including changes today)  Current Outpatient Medications   Medication Sig Dispense Refill   • Galcanezumab-gnlm (EMGALITY) 120 MG/ML Solution Auto-injector Inject 1 PEN as instructed Q 4 Weeks. 1 PEN 5   • SUMAtriptan (IMITREX) 100 MG tablet 1 tab at headache onset; repeat in1 hour prn 9 Tab 5   • naproxen sodium (ANAPROX DS) 550 MG tablet 1-2 tab headache onset; repeat in 4-6 hours prn. 45 Tab 1   • SUMAtriptan Succinate 4 MG/0.5ML Solution Auto-injector Inject 1 Application as instructed Once PRN for up to 1 dose. Inject once at headache onset; repeat in 1 hour prn 0.5 mL 6   • gabapentin (NEURONTIN) 600 MG tablet TAKE 2 TABLETS BY MOUTH 3 TIMES A  Tab 3   • amitriptyline (ELAVIL) 25 MG Tab TAKE 1 TABLET BY MOUTH EVERYDAY AT BEDTIME 90 Tab 1   • Dapagliflozin-Metformin HCl ER (XIGDUO XR)  MG TABLET SR 24 HR Take 1 Tab by mouth every morning. 30 Tab 11   • Lancets Misc Lancets order: Lancets for Abbott Precision xtra meter. Sig: use am and prn  "ssx high or low sugar. 100 Each 3     No current facility-administered medications for this visit.      He  has a past medical history of Allergy, Anxiety, Diabetes (HCC), Head ache, and Hypertension.    ROS as stated in hpi  No chest pain, no shortness of breath, no abdominal pain       Objective:     /74 (BP Location: Left arm, Patient Position: Sitting)   Pulse (!) 107   Temp 37.2 °C (98.9 °F) (Temporal)   Resp 14   Ht 1.854 m (6' 1\")   Wt 101.6 kg (224 lb)   SpO2 97%  Body mass index is 29.55 kg/m².   Physical Exam:  Constitutional: Alert, no distress.  Skin: Warm, dry, good turgor,no cyanosis, no rashes in visible areas.  Eye: Equal, round and reactive, conjunctiva clear, lids normal.  Ears: No tenderness, no discharge.  External canals are without any significant edema or erythema.  Tympanic membranes are without any inflammation, no effusion.  Gross auditory acuity is intact.  Nose: symmetrical without tenderness, no discharge.  Mouth/Throat: lips without lesion.  Oropharynx clear.  Throat without erythema, exudates or tonsillar enlargement.  Neck: Trachea midline, no masses, no obvious thyroid enlargement.. No cervical or supraclavicular lymphadenopathy. Range of motion within normal limits.  Neuro: Cranial nerves 2-12 grossly intact.  No sensory deficit.  Respiratory: Unlabored respiratory effort, lungs clear to auscultation, no wheezes, no ronchi.  Cardiovascular: Normal S1, S2, no murmur, no edema.  Abdomen: Soft, non-tender, no masses, no guarding,  no hepatosplenomegaly.  Psych: Alert and oriented x3, normal affect and mood and judgement.        Assessment and Plan:   The following treatment plan was discussed    1. Chronic migraine  Chronic, ongoing. Improving.  Seeing Dr. Fitzgerald.  Just started new preventative med.  Occasional break through headache.  FMLA paperwork today for intermittent leave.  One day/month. immitrex for rescue.  Has appointment in January with neurology.  Monitor. "     2. Type 2 diabetes mellitus with complication, without long-term current use of insulin (HCC)  Chronic, ongoing. Improving.  On insulin, xigduo.  Establishing with new endocrinologist at [facility] next week.  Discussed that he is currently not on an ACE.ARB or a statin.  I have written these down for him to discuss with endocrinology.  Monitor.     3. Diabetic polyneuropathy associated with type 2 diabetes mellitus (HCC)  Chronic, ongoing. Not improving.  Followed by pain management.  Taking gabapentin without relief.  Has appointment tomorrow to discuss.  Monitor.       Followup: Return in about 6 months (around 3/25/2020).         Educated in proper administration of medication(s) ordered today including safety, possible SE, risks, benefits, rationale and alternatives to therapy.     Please note that this dictation was created using voice recognition software. I have made every reasonable attempt to correct obvious errors, but I expect that there are errors of grammar and possibly content that I did not discover before finalizing the note.

## 2019-09-25 NOTE — TELEPHONE ENCOUNTER
He should go to the HR department at his place of employment and ask for [facility] paperwork, in which case we can fill out our part very easily.

## 2019-09-25 NOTE — ASSESSMENT & PLAN NOTE
Here for renewal of [place] paperwork for ongoing migraine headache syndrome. Has started to have some migraines and needs note for work and FMLA update.  Asking for intermittent leave for his migraine. Once a month intermittent.    Has started  emgality with Dr. Fitzgerald.  Is overdue for botox injections.  Sees neuro every 6 months  Reports some increasing neck stiffness.

## 2019-09-30 RX ORDER — DAPAGLIFLOZIN AND METFORMIN HYDROCHLORIDE 10; 1000 MG/1; MG/1
TABLET, FILM COATED, EXTENDED RELEASE ORAL
Qty: 30 TAB | Refills: 11 | Status: SHIPPED | OUTPATIENT
Start: 2019-09-30 | End: 2022-01-03

## 2019-09-30 NOTE — TELEPHONE ENCOUNTER
Was the patient seen in the last year in this department? No 09/17/18 no pending [facility] appt     Does patient have an active prescription for medications requested? No     Received Request Via: Pharmacy     XIGDUO XR  MG TABLET SR 24 HR    Sig: TAKE 1 TABLET BY MOUTH EVERY DAY IN THE MORNING

## 2019-10-14 ENCOUNTER — OFFICE VISIT (OUTPATIENT)
Dept: NEUROLOGY | Facility: MEDICAL CENTER | Age: 44
End: 2019-10-14
Payer: COMMERCIAL

## 2019-10-14 VITALS
SYSTOLIC BLOOD PRESSURE: 116 MMHG | BODY MASS INDEX: 30.22 KG/M2 | HEART RATE: 93 BPM | OXYGEN SATURATION: 98 % | WEIGHT: 228 LBS | RESPIRATION RATE: 16 BRPM | HEIGHT: 73 IN | TEMPERATURE: 98.1 F | DIASTOLIC BLOOD PRESSURE: 80 MMHG

## 2019-10-14 DIAGNOSIS — G40.909 SEIZURE CEREBRAL (HCC): ICD-10-CM

## 2019-10-14 DIAGNOSIS — M79.7 FIBROMYALGIA: ICD-10-CM

## 2019-10-14 DIAGNOSIS — E11.42 DIABETIC POLYNEUROPATHY ASSOCIATED WITH TYPE 2 DIABETES MELLITUS (HCC): ICD-10-CM

## 2019-10-14 PROCEDURE — 99214 OFFICE O/P EST MOD 30 MIN: CPT | Performed by: PSYCHIATRY & NEUROLOGY

## 2019-10-14 RX ORDER — NAPROXEN SODIUM 550 MG/1
TABLET ORAL
Qty: 45 TAB | Refills: 4 | Status: SHIPPED | OUTPATIENT
Start: 2019-10-14 | End: 2019-12-31

## 2019-10-14 RX ORDER — GABAPENTIN 600 MG/1
600 TABLET ORAL 2 TIMES DAILY
Qty: 60 TAB | Refills: 0 | Status: SHIPPED | DISCHARGE
Start: 2019-10-14 | End: 2019-12-16 | Stop reason: SDUPTHER

## 2019-10-14 RX ORDER — PREGABALIN 100 MG/1
100 CAPSULE ORAL 2 TIMES DAILY
Qty: 60 CAP | Refills: 0 | Status: SHIPPED | DISCHARGE
Start: 2019-10-14 | End: 2019-11-14

## 2019-10-14 ASSESSMENT — ENCOUNTER SYMPTOMS
LOSS OF CONSCIOUSNESS: 0
SENSORY CHANGE: 1
DEPRESSION: 0
TREMORS: 1
MEMORY LOSS: 1
SEIZURES: 0
NAUSEA: 1
CONSTIPATION: 1
NECK PAIN: 1

## 2019-10-14 NOTE — PROGRESS NOTES
Subjective:      Ivan Prescott is a 44 y.o. male who presents for follow-up, originally seen for chronic migraine, diabetes-related polyneuropathy and symptomatic RLS, and seizures, but who has had ever increasing problems with neck pain, bilateral upper extremity paresthesias, tremulousness, malaise and cognitive impairment.    HPI    Migraine: The headaches actually have come under much better control, still receiving Botox, we just started Emgality injections 2 weeks ago, so it is not clear whether this is providing additional benefit.  Still, he has not had a severe migraine in a while, though he still has mild to moderate headaches on a near daily basis.  From these there is only minimal disability.  Anaprox  mg taken as a single tablet works very effectively, he has rarely had to use his Imitrex.  In this regard he is happy.    Neuropathy: He still has the numbness in the feet, but he is now complaining of his bilateral upper extremity numbness and paresthesias in the fourth and fifth fingers extending up to the elbow.  This is near constant loss of sensation, occasional tingling.  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    Seizures: He has had no recurrences, he is now off Depakote entirely, he is quite concerned about all of his pain, tremulousness, etc. leading into his seizure itself.    Neck pain, malaise, tremulousness, fatigue: Something new that has been increasing over the last couple of months, even while his headaches have been improving.  The neck pain is bilateral, right greater than left sided, it does not radiate into the arms, but it is constant.  With the jostling in his truck, trying to play basketball in the afternoons, this is resulted in more generalized pain, tremulousness in the hands now more prominent.  Though this is not increase his migraine headaches, all of this has  "become quite disabling, he now notes that his memory and mental processing has become impaired.  He certainly has not slept well and has a significant degree of malaise.  He is also now complaining of more regular nausea, on top of his chronic constipation.    Through his pain specialist's office, he is coming off gabapentin, now 600 mg, twice daily, (originally 1200 mg, 3 times daily), and Lyrica is up to 100 mg, twice daily.  He is still on Elavil 25 mg taken in the afternoon, providing little benefit when it comes to sleep or pain.  The Anaprox used for his headaches actually helps the neck pain consistently.  They have also scheduled a plain neck x-ray.  Physical therapy has not been prescribed, given this gentlemen's regular basketball playing and running, they feel essentially he is already getting physical therapy!    Medical, surgical and family histories are reviewed in the electronic health record, there are no new drug allergies.  We did complete FMLA paperwork a couple of weeks ago.  He is on gabapentin 600 mg, twice daily, Anaprox  mg and Imitrex 6 mg injections as needed, Lyrica 100 mg, twice daily (he believes this is the dose), Xigduo 10/1000 daily, Emgality 120 mg injections monthly, and Elavil 25 mg at about 4 in the afternoon.    Review of Systems   Constitutional: Positive for malaise/fatigue.   Gastrointestinal: Positive for constipation and nausea.   Musculoskeletal: Positive for neck pain.   Neurological: Positive for tremors and sensory change. Negative for seizures and loss of consciousness.   Psychiatric/Behavioral: Positive for memory loss. Negative for depression.   All other systems reviewed and are negative.       Objective:     /80 (BP Location: Right arm, Patient Position: Sitting, BP Cuff Size: Adult)   Pulse 93   Temp 36.7 °C (98.1 °F) (Temporal)   Resp 16   Ht 1.854 m (6' 1\")   Wt 103.4 kg (228 lb)   SpO2 98%   BMI 30.08 kg/m²      Physical Exam    Ivan does " appear in some distress.  He still cooperative.  Vital signs are stable.  There is no malar rash or jaw claudication.  There is some mild tenderness over the right hemicranium, there is bilateral occipital ridge and notch tenderness, right greater than left, there is also tenderness down the right cervical paraspinal muscle body.  Range of motion is constrained in all planes, related to the neck pain and stiffness, mostly on the right.  Carotid pulses are present without asymmetry.  Cardiac evaluation is unremarkable.  There is some mild tenderness at the elbows bilaterally.    Mental status exam is intact.  Cranial nerve exam as well shows no deficits.    Musculoskeletal exam reveals normal tone bilaterally, there is no asterixis or drift.  There is a fine tremulousness with both hands seen when held against gravity, attenuating completely at rest.  There is no evidence of atrophy of the hyperthenar eminence on either side.  There is some mild atrophy of the FDI.  Still, strength is intact in the upper extremities with attention paid to the hand intrinsic musculatures.  As well there is no weakness with the feet.  Reflexes are diffusely hypoactive, none are dropped in the upper extremities.  Ankle jerks are absent bilaterally.    Repetitive movements are intact in the upper extremities.  There is no appendicular dystaxia.    Sensory exam does reveal a decrement of temperature in the hyperthenar eminence as well as the fifth finger in both hands, this extends medially up the forearms to the elbows.  Vibration is diminished in a stocking pattern in the lower extremities below the ankles only.  This is intact in the hands.    Assessment/Plan:     1. Chronic migraine  This is actually improved remarkably, even though he is suffering from more generalized pain symptoms.  His exam is showing some bilateral occipital notch tenderness with direct compression increasing pain, suggestive of possible occipital neuralgiform  discomfort.  Occipital nerve block might prove beneficial.  I recommended he take the Anaprox  mg every day, he can then take an additional dose as needed for the headaches themselves.  Given this improvement, I doubt the headaches are a major contributor to his other pain related symptoms.  He will continue the Emgality and Botox.  He and I will follow-up in 6 months.    - naproxen sodium (ANAPROX DS) 550 MG tablet; 1 tab daily and 1 tab prn headache  Dispense: 45 Tab; Refill: 4    2. Seizure cerebral  Also stable, off Depakote, hopefully this will not proved to be an issue for him moving forwards.    3. Diabetic polyneuropathy associated with type 2 diabetes mellitus (HCC)  Still something of an issue, the neuropathy he has will predispose to compression neuropathy, which I think is the big player when it comes to both hands, especially given the nature of his work, ulnar neuritis is a susceptible issue.  He may require more directed occupational therapies.  Though it is bilateral, given the nature of his work, he will predispose him to this type of focal compression, I do not think imaging of the cervical spine is necessary at this time.  He has already had plain x-rays ordered through his pain specialist.    4. Fibromyalgia  The bigger issue for him, a pain specialist I am not, I will need to defer this conversation and diagnosis to his pain specialist.  It does seem they are doing the right thing, adjusting medications appropriately from gabapentin to Lyrica, hopefully the addition of the Anaprox will help.  He was told that this condition, if diagnosed, is often associated with non-pain related symptoms, including tremor, malaise, impaired concentration, insomnia, etc., some of which he seems to be experiencing.    - pregabalin (LYRICA) 100 MG Cap; Take 1 Cap by mouth 2 times a day for 31 days.  Dispense: 60 Cap; Refill: 0  - gabapentin (NEURONTIN) 600 MG tablet; Take 1 Tab by mouth 2 times a day.   Dispense: 60 Tab; Refill: 0  - naproxen sodium (ANAPROX DS) 550 MG tablet; 1 tab daily and 1 tab prn headache  Dispense: 45 Tab; Refill: 4    Time: 25 minutes spent face-to-face for exam, review, discussion, and education, of this over 60% of the time spent counseling and coordinating care.

## 2019-10-24 ENCOUNTER — HOSPITAL ENCOUNTER (OUTPATIENT)
Dept: RADIOLOGY | Facility: MEDICAL CENTER | Age: 44
End: 2019-10-24
Attending: PHYSICAL MEDICINE & REHABILITATION
Payer: COMMERCIAL

## 2019-10-24 DIAGNOSIS — M54.2 CERVICALGIA: ICD-10-CM

## 2019-10-24 PROCEDURE — 72050 X-RAY EXAM NECK SPINE 4/5VWS: CPT

## 2019-11-25 ENCOUNTER — TELEPHONE (OUTPATIENT)
Dept: ENDOCRINOLOGY | Facility: MEDICAL CENTER | Age: 44
End: 2019-11-25

## 2019-11-25 NOTE — TELEPHONE ENCOUNTER
DOCUMENTATION OF PAR STATUS:    1. Name of Medication & Dose: Xigduo XR 10-1000mg     2. Name of Prescription Coverage Company & phone #: [employer]    3. Date Prior Auth Submitted: 11/25/19    4. What information was given to obtain insurance decision? [id number]    5. Prior Auth Status? Pending    6. Action Taken: Pharmacy Notified: yes

## 2019-11-27 NOTE — TELEPHONE ENCOUNTER
FINAL PRIOR AUTHORIZATION STATUS:    1.  Name of Medication & Dose: Xigduo XR 10-1000mg     2. Prior Auth Status (if approved--length of approval):  Approved through 11/25/20    3. Action Taken: Pharmacy Notified: yes    Documentation was scanned into media and attached to this telephone encounter.

## 2019-12-16 DIAGNOSIS — M79.7 FIBROMYALGIA: ICD-10-CM

## 2019-12-16 RX ORDER — GABAPENTIN 600 MG/1
1200 TABLET ORAL 3 TIMES DAILY
Qty: 540 TAB | Refills: 0 | Status: SHIPPED | OUTPATIENT
Start: 2019-12-16 | End: 2020-03-27

## 2019-12-16 NOTE — TELEPHONE ENCOUNTER
Was the patient seen in the last year in this department? Yes    Does patient have an active prescription for medications requested? No     Received Request Via: Pharmacy DIRECTIONS AND QUANTITY CHANGE

## 2019-12-17 ENCOUNTER — OFFICE VISIT (OUTPATIENT)
Dept: MEDICAL GROUP | Facility: PHYSICIAN GROUP | Age: 44
End: 2019-12-17
Payer: COMMERCIAL

## 2019-12-17 VITALS
TEMPERATURE: 97.4 F | SYSTOLIC BLOOD PRESSURE: 124 MMHG | HEIGHT: 73 IN | DIASTOLIC BLOOD PRESSURE: 82 MMHG | OXYGEN SATURATION: 99 % | WEIGHT: 224 LBS | BODY MASS INDEX: 29.69 KG/M2 | RESPIRATION RATE: 14 BRPM | HEART RATE: 99 BPM

## 2019-12-17 DIAGNOSIS — J02.9 SORE THROAT: ICD-10-CM

## 2019-12-17 DIAGNOSIS — F51.01 PRIMARY INSOMNIA: ICD-10-CM

## 2019-12-17 PROCEDURE — 99214 OFFICE O/P EST MOD 30 MIN: CPT | Performed by: NURSE PRACTITIONER

## 2019-12-17 RX ORDER — TRAZODONE HYDROCHLORIDE 100 MG/1
100 TABLET ORAL NIGHTLY PRN
Qty: 30 TAB | Refills: 3 | Status: SHIPPED | OUTPATIENT
Start: 2019-12-17 | End: 2020-03-04

## 2019-12-17 RX ORDER — ONDANSETRON 4 MG/1
4 TABLET, FILM COATED ORAL EVERY 4 HOURS PRN
Qty: 20 TAB | Refills: 0 | Status: SHIPPED | OUTPATIENT
Start: 2019-12-17 | End: 2020-03-04

## 2019-12-17 NOTE — LETTER
December 17, 2019         Patient: Ivan Prescott   YOB: 1975   Date of Visit: 12/17/2019           To Whom it May Concern:    Ivan Prescott was seen in my clinic on 12/17/2019. Please excuse from December 13th, 16h, 17 and returning back to work on 23rd.    If you have any questions or concerns, please don't hesitate to call.        Sincerely,           ODILON Oshea.  Electronically Signed

## 2019-12-17 NOTE — TELEPHONE ENCOUNTER
Requested Prescriptions     Signed Prescriptions Disp Refills   • gabapentin (NEURONTIN) 600 MG tablet 540 Tab 0     Sig: Take 2 Tabs by mouth 3 times a day.     Authorizing Provider: EFRAIN MILLER A.P.R.N.

## 2019-12-17 NOTE — PROGRESS NOTES
Chief Complaint   Patient presents with   • Pharyngitis   • Headache   • Insomnia   • Loss of Appetite       Subjective:   Ivan Prescott is a 44 y.o. male here today for evaluation and management of and acute and chronic issue today    Sore throat  Started Thursday with stomach flu, diarrhea and puking.  Headache.  Unable to eat and drink.  Yesterday still has some stomach issues, sore throat started. Reports mild nasal congestion.    No fever today.  No blood in vomit or stool.    Has not taken any over the counter medication.  Sick contacts at work.      Primary insomnia  Reports that the amytryptyline 25 mg is not helping his sleep.  Negative apnea test.  Will try some trazodone         Current medicines (including changes today)  Current Outpatient Medications   Medication Sig Dispense Refill   • ondansetron (ZOFRAN) 4 MG Tab tablet Take 1 Tab by mouth every four hours as needed for Nausea/Vomiting. 20 Tab 0   • traZODone (DESYREL) 100 MG Tab Take 1 Tab by mouth at bedtime as needed for Sleep. 30 Tab 3   • gabapentin (NEURONTIN) 600 MG tablet Take 2 Tabs by mouth 3 times a day. 540 Tab 0   • naproxen sodium (ANAPROX DS) 550 MG tablet 1 tab daily and 1 tab prn headache 45 Tab 4   • XIGDUO XR  MG TABLET SR 24 HR TAKE 1 TABLET BY MOUTH EVERY DAY IN THE MORNING 30 Tab 11   • Galcanezumab-gnlm (EMGALITY) 120 MG/ML Solution Auto-injector Inject 1 PEN as instructed Q 4 Weeks. 1 PEN 5   • SUMAtriptan (IMITREX) 100 MG tablet 1 tab at headache onset; repeat in1 hour prn 9 Tab 5   • SUMAtriptan Succinate 4 MG/0.5ML Solution Auto-injector Inject 1 Application as instructed Once PRN for up to 1 dose. Inject once at headache onset; repeat in 1 hour prn 0.5 mL 6   • amitriptyline (ELAVIL) 25 MG Tab TAKE 1 TABLET BY MOUTH EVERYDAY AT BEDTIME 90 Tab 1   • Lancets Misc Lancets order: Lancets for [employer]ision xtra meter. Sig: use am and prn ssx high or low sugar. 100 Each 3     No current facility-administered  "medications for this visit.      He  has a past medical history of Allergy, Anxiety, Diabetes (HCC), Head ache, and Hypertension.    ROS as stated in hpi  No chest pain, no shortness of breath, no abdominal pain       Objective:     /82 (BP Location: Left arm, Patient Position: Sitting)   Pulse 99   Temp 36.3 °C (97.4 °F) (Temporal)   Resp 14   Ht 1.854 m (6' 1\")   Wt 101.6 kg (224 lb)   SpO2 99%  Body mass index is 29.55 kg/m². afebrile.  Physical Exam:  Constitutional: Alert, no distress.  Skin: Warm, dry, good turgor,no cyanosis, no rashes in visible areas.  Eye: Equal, round and reactive, conjunctiva clear, lids normal.  Ears: No tenderness, no discharge.  External canals are without any significant edema or erythema.  Tympanic membranes are without any inflammation, no effusion.  Gross auditory acuity is intact.  Nose: symmetrical without tenderness, no discharge.  Mouth/Throat: lips without lesion.  Oropharynx clear.  Throat without erythema, exudates or tonsillar enlargement.  Neck: Trachea midline, no masses, no obvious thyroid enlargement.. No cervical or supraclavicular lymphadenopathy. Range of motion within normal limits.  Neuro: Cranial nerves 2-12 grossly intact.  No sensory deficit.  Respiratory: Unlabored respiratory effort, lungs clear to auscultation, no wheezes, no ronchi.  Cardiovascular: Normal S1, S2, no murmur, no edema.  Abdomen: Soft, non-tender, no masses, no guarding,  no hepatosplenomegaly.  Psych: Alert and oriented x3, normal affect and mood and judgement.        Assessment and Plan:   The following treatment plan was discussed    1. Sore throat  This is anew problem to me.  Acute.  No signs of bacterial infection.  I suspect this is a viral combination of GI symptoms and sore throat.  Encouraged fluids, rest.  zofran RX to pharmacy to help with nausea.  Monitor and follow.     2. Primary insomnia  Chronic, ongoing worsening.  Will try some trazodone.  Patient to report back " to me on Thursday.  Monitor and follow.       Followup: Return if symptoms worsen or fail to improve.         Educated in proper administration of medication(s) ordered today including safety, possible SE, risks, benefits, rationale and alternatives to therapy.     Please note that this dictation was created using voice recognition software. I have made every reasonable attempt to correct obvious errors, but I expect that there are errors of grammar and possibly content that I did not discover before finalizing the note.

## 2019-12-31 DIAGNOSIS — M79.7 FIBROMYALGIA: ICD-10-CM

## 2019-12-31 RX ORDER — NAPROXEN SODIUM 550 MG/1
TABLET ORAL
Qty: 135 TAB | Refills: 1 | Status: SHIPPED | OUTPATIENT
Start: 2019-12-31 | End: 2020-03-04 | Stop reason: SDUPTHER

## 2020-01-22 RX ORDER — AMITRIPTYLINE HYDROCHLORIDE 25 MG/1
TABLET, FILM COATED ORAL
Qty: 90 TAB | Refills: 1 | Status: SHIPPED | OUTPATIENT
Start: 2020-01-22 | End: 2020-03-23

## 2020-02-06 ENCOUNTER — OFFICE VISIT (OUTPATIENT)
Dept: URGENT CARE | Facility: PHYSICIAN GROUP | Age: 45
End: 2020-02-06
Payer: COMMERCIAL

## 2020-02-06 VITALS
OXYGEN SATURATION: 99 % | HEART RATE: 90 BPM | RESPIRATION RATE: 13 BRPM | SYSTOLIC BLOOD PRESSURE: 124 MMHG | HEIGHT: 73 IN | TEMPERATURE: 98.2 F | DIASTOLIC BLOOD PRESSURE: 82 MMHG | BODY MASS INDEX: 29.82 KG/M2 | WEIGHT: 225 LBS

## 2020-02-06 DIAGNOSIS — L03.114 CELLULITIS OF LEFT UPPER EXTREMITY: ICD-10-CM

## 2020-02-06 PROCEDURE — 99214 OFFICE O/P EST MOD 30 MIN: CPT | Performed by: FAMILY MEDICINE

## 2020-02-06 RX ORDER — SUMATRIPTAN 100 MG/1
TABLET, FILM COATED ORAL
Qty: 9 TAB | Refills: 6 | Status: SHIPPED | OUTPATIENT
Start: 2020-02-06 | End: 2020-10-12 | Stop reason: SDUPTHER

## 2020-02-06 RX ORDER — AMOXICILLIN AND CLAVULANATE POTASSIUM 875; 125 MG/1; MG/1
1 TABLET, FILM COATED ORAL 2 TIMES DAILY
Qty: 14 TAB | Refills: 0 | Status: SHIPPED | OUTPATIENT
Start: 2020-02-06 | End: 2020-02-13

## 2020-02-06 ASSESSMENT — ENCOUNTER SYMPTOMS
FEVER: 0
NUMBNESS: 0
WEAKNESS: 0

## 2020-02-06 NOTE — TELEPHONE ENCOUNTER
Requested Prescriptions     Signed Prescriptions Disp Refills   • sumatriptan (IMITREX) 100 MG tablet 9 Tab 6     Si tab at headache onset; repeat in1 hour prn     Authorizing Provider: EFRAIN MILLER A.P.R.N.

## 2020-02-06 NOTE — PROGRESS NOTES
"Subjective:   Ivan Prescott  is a 44 y.o. male who presents for Hand Pain (Left hand infection, pt is diabetic)        Hand Injury   This is a new problem. The current episode started in the past 7 days. The problem occurs constantly. The problem has been rapidly worsening. Pertinent negatives include no fever, numbness or weakness.     Review of Systems   Constitutional: Negative for fever.   Neurological: Negative for weakness and numbness.     Allergies   Allergen Reactions   • Acetaminophen Swelling     Face swells up for 4 hours      Objective:   /82   Pulse 90   Temp 36.8 °C (98.2 °F)   Resp 13   Ht 1.854 m (6' 1\")   Wt 102.1 kg (225 lb)   SpO2 99%   BMI 29.69 kg/m²   Physical Exam  Constitutional:       General: He is not in acute distress.     Appearance: He is well-developed.   HENT:      Head: Normocephalic and atraumatic.   Eyes:      Conjunctiva/sclera: Conjunctivae normal.      Pupils: Pupils are equal, round, and reactive to light.   Cardiovascular:      Rate and Rhythm: Normal rate and regular rhythm.      Heart sounds: No murmur.   Pulmonary:      Effort: Pulmonary effort is normal. No respiratory distress.      Breath sounds: Normal breath sounds.   Abdominal:      General: There is no distension.      Palpations: Abdomen is soft.      Tenderness: There is no tenderness.   Skin:     General: Skin is warm and dry.          Neurological:      Mental Status: He is alert and oriented to person, place, and time.      Sensory: No sensory deficit.      Deep Tendon Reflexes: Reflexes are normal and symmetric.           Assessment/Plan:   1. Cellulitis of left upper extremity  - amoxicillin-clavulanate (AUGMENTIN) 875-125 MG Tab; Take 1 Tab by mouth 2 times a day for 7 days.  Dispense: 14 Tab; Refill: 0    Differential diagnosis, natural history, supportive care, and indications for immediate follow-up discussed.       "

## 2020-02-18 NOTE — TELEPHONE ENCOUNTER
From: Jorgito Valiente  To: NHAN Oshea  Sent: 5/10/2017 7:44 PM PDT  Subject: Non-Urgent Medical Question    Sitting around 315 average since 1000 mg. I think I should stay on 1000, but I agree I need something to stable me more. The cialis has not worked. I have some diabetic snacks and supplement I started Sunday doing what I can.  ----- Message -----  From: NHAN Oshea  Sent: 5/10/2017 7:12 AM PDT  To: Jorgito Valiente  Subject: RE: Non-Urgent Medical Question    Thank you for sending this to me. It looks like the blood sugars in the 400 range are coming down. How is it going with the 1000 mg of metformin twice/day? Please bring your blood sugar journal to your upcoming appointment. We may need to add another medication to our plan to get the blood sugars down. Keep in touch.  NHAN Oshea      ----- Message -----   From: JORGITO VALIENTE   Sent: 5/8/2017 7:13 PM PDT   To: NHAN Oshea  Subject: Non-Urgent Medical Question    Blood sugar 286 as of 7 pm 5/8/17.   HISTORY OF PRESENT ILLNESS  Ramya Vega is a 46 y.o. female referred for Asthma. Pt has a life long history of Asthma which recent worsening. Pt was seen at an Urgent care center twice over the past month for increased SOB, wheezing that have prevented pt from getting a sound night's sleep for a few months. Pt was previously on Advair however control has waned and this was stopped. She is not currently on any controller medications. Pt uses Albuterol inhaler at least once daily until last week. Triggers include pollen, exercise and changes in the weather. Pt has not had any hospital admissions for Asthma. She is a non smoker and is not exposed to noxious substances in her work. Review of Systems   Constitutional: Negative for chills, diaphoresis, fever, malaise/fatigue and weight loss. HENT: Positive for congestion. Negative for ear discharge, ear pain, hearing loss, nosebleeds, sinus pain, sore throat and tinnitus. Eyes: Negative for blurred vision, double vision, photophobia, pain, discharge and redness. Respiratory: Positive for shortness of breath and wheezing. Negative for cough, hemoptysis, sputum production and stridor. Cardiovascular: Negative for chest pain, palpitations, orthopnea, claudication, leg swelling and PND. Gastrointestinal: Negative for abdominal pain, blood in stool, constipation, diarrhea, heartburn, melena, nausea and vomiting. Genitourinary: Negative for dysuria, flank pain, frequency, hematuria and urgency. Musculoskeletal: Positive for back pain. Negative for falls, joint pain, myalgias and neck pain. Skin: Negative for itching and rash. Neurological: Positive for headaches. Negative for dizziness, tingling, tremors, sensory change, speech change, focal weakness, seizures, loss of consciousness and weakness. Endo/Heme/Allergies: Negative for environmental allergies and polydipsia. Does not bruise/bleed easily.    Psychiatric/Behavioral: Positive for depression. Negative for hallucinations, memory loss, substance abuse and suicidal ideas. The patient is not nervous/anxious and does not have insomnia. Past Medical History:   Diagnosis Date    Asthma     No medications, doesn't have inhaler at home; flared by spring allergies    Chronic back pain 9/20/2013    3/28/2014:  Chronic back pain, disk herniation 11 years ago, back surgery 5 years ago (doesn't want to have another back surgery) -- [facility] -- No relief with back surgery, ESIs used to help, but then became ineffective     Depression     Headache     Joint pain     Kidney infection     Muscle pain     Numbness and tingling     Pseudotumor cerebri     After first pregnancy, seemed to resolve but occasionally gets flares of HAs, sleep helps     Past Surgical History:   Procedure Laterality Date    CHEST SURGERY PROCEDURE UNLISTED      HX GYN      Partial hysterectomy     Current Outpatient Medications on File Prior to Visit   Medication Sig Dispense Refill    fluticasone propionate (FLONASE) 50 mcg/actuation nasal spray INSTILL TWO SQUIRTS IN THE NOSTRILS D UTD      albuterol-ipratropium (DUO-NEB) 2.5 mg-0.5 mg/3 ml nebu       montelukast (SINGULAIR) 10 mg tablet TK 1 T PO QD      albuterol (PROVENTIL HFA, VENTOLIN HFA) 90 mcg/actuation inhaler Take 2 Puffs by inhalation every six (6) hours as needed for Wheezing or Shortness of Breath. 1 Inhaler 2    acetaminophen (TYLENOL EXTRA STRENGTH) 500 mg tablet Take 2 Tabs by mouth every eight (8) hours as needed for Pain. 99 Tab 11    tiZANidine (ZANAFLEX) 4 mg tablet 1-2  qhs  prn 60 Tab 1    baclofen (LIORESAL) 10 mg tablet Take 1/2 to one tab po qhs prn muscle spasms 30 tablet 1    ondansetron hcl (ZOFRAN, AS HYDROCHLORIDE,) 4 mg tablet Take 4 mg by mouth every eight (8) hours as needed for Nausea.  phenazopyridine (PYRIDIUM) 100 mg tablet Take  by mouth three (3) times daily (after meals).       ondansetron [facility] ODT) 8 mg disintegrating tablet Take 1 tablet by mouth every twelve (12) hours as needed for Nausea. 40 tablet 0    dextroamphetamine-amphetamine (ADDERALL) 10 mg tablet Take 10 mg by mouth.  b complex vitamins (B COMPLEX 1) tablet Take 1 Tab by mouth daily.  methocarbamol (ROBAXIN) 750 mg tablet Take 1 Tab by mouth three (3) times daily as needed (muscle spasm, low back pain). 90 Tab 0    buPROPion (WELLBUTRIN) 100 mg tablet Take 1 Tab by mouth two (2) times a day. 60 Tab 1    ALPRAZolam (XANAX) 1 mg tablet Take 1 Tab by mouth three (3) times daily as needed for Anxiety. 90 Tab 1    meloxicam (MOBIC) 15 mg tablet Take 1 Tab by mouth daily as needed for Pain (Take with food). 30 Tab 1     No current facility-administered medications on file prior to visit.       Allergies   Allergen Reactions    Fentanyl Hives    Pollen Extracts Itching     Family History   Problem Relation Age of Onset   [place] Migraines Mother    [place] Arthritis-osteo Mother     Asthma Mother     Migraines Father     Cancer Father         prostate, colon, testicular, liver, skin    Elevated Lipids Father     Migraines Sister     Asthma Sister     Migraines Maternal Grandmother      Social History     Socioeconomic History    Marital status:      Spouse name: Not on file    Number of children: Not on file    Years of education: Not on file    Highest education level: Not on file   Occupational History    Not on file   Social Needs    Financial resource strain: Not on file    Food insecurity:     Worry: Not on file     Inability: Not on file    Transportation needs:     Medical: Not on file     Non-medical: Not on file   Tobacco Use    Smoking status: Former Smoker     Packs/day: 0.50     Years: 10.00     Pack years: 5.00     Types: Cigarettes     Last attempt to quit: 2015     Years since quittin.1    Smokeless tobacco: Never Used    Tobacco comment: vaping stopped    Substance and Sexual Activity    Alcohol use: Yes     Comment: 2/mo    Drug use: No    Sexual activity: Yes     Partners: Male     Birth control/protection: Surgical   Lifestyle    Physical activity:     Days per week: Not on file     Minutes per session: Not on file    Stress: Not on file   Relationships    Social connections:     Talks on phone: Not on file     Gets together: Not on file     Attends Mosque service: Not on file     Active member of club or organization: Not on file     Attends meetings of clubs or organizations: Not on file     Relationship status: Not on file    Intimate partner violence:     Fear of current or ex partner: Not on file     Emotionally abused: Not on file     Physically abused: Not on file     Forced sexual activity: Not on file   Other Topics Concern    Not on file   Social History Narrative    Not on file     Blood pressure 110/70, pulse 62, temperature 98 °F (36.7 °C), temperature source Oral, resp. rate 20, height 5' 6\" (1.676 m), weight 79.8 kg (176 lb), SpO2 100 %. Physical Exam  Constitutional:       General: She is not in acute distress. Appearance: Normal appearance. She is normal weight. She is not toxic-appearing or diaphoretic. HENT:      Head: Normocephalic and atraumatic. Right Ear: External ear normal.      Left Ear: External ear normal.      Nose: No congestion or rhinorrhea. Mouth/Throat:      Mouth: Mucous membranes are dry. Pharynx: Oropharynx is clear. No oropharyngeal exudate or posterior oropharyngeal erythema. Eyes:      General:         Right eye: No discharge. Left eye: No discharge. Extraocular Movements: Extraocular movements intact. Conjunctiva/sclera: Conjunctivae normal.      Pupils: Pupils are equal, round, and reactive to light. Neck:      Musculoskeletal: Normal range of motion and neck supple. No neck rigidity or muscular tenderness. Vascular: No carotid bruit.    Cardiovascular:      Rate and Rhythm: Normal rate and regular rhythm. Pulses: Normal pulses. Heart sounds: Normal heart sounds. No murmur. No friction rub. No gallop. Pulmonary:      Effort: Pulmonary effort is normal. No respiratory distress. Breath sounds: Normal breath sounds. No stridor. No wheezing, rhonchi or rales. Chest:      Chest wall: No tenderness. Abdominal:      General: Abdomen is flat. Bowel sounds are normal.      Palpations: Abdomen is soft. Tenderness: There is no abdominal tenderness. There is no guarding or rebound. Musculoskeletal:         General: No swelling, tenderness, deformity or signs of injury. Right lower leg: No edema. Left lower leg: No edema. Lymphadenopathy:      Cervical: No cervical adenopathy. Skin:     General: Skin is warm and dry. Coloration: Skin is not jaundiced or pale. Findings: No bruising, erythema, lesion or rash. Neurological:      General: No focal deficit present. Mental Status: She is alert and oriented to person, place, and time. Motor: No weakness. Coordination: Coordination normal.   Psychiatric:         Mood and Affect: Mood normal.         Behavior: Behavior normal.         Thought Content: Thought content normal.         Judgment: Judgment normal.       PFT: mild obstruction with complete reversibility    ASSESSMENT and PLAN  Encounter Diagnoses   Name Primary?  Moderate persistent asthma, unspecified whether complicated Yes    Allergic rhinitis due to pollen, unspecified seasonality        Pt with moderate persistent Asthma, currently on DEANDRE alone. Triggers appear to be pollen with possible exercise and cold air induced components. Review of outside labs do not demonstrate Eosinophilia. Start LABA/ICS Dulera and continue prn DEANDRE. Proper inhaler use counseled. Pt educated on recognizing SSx of exacerbation. Counseled on treatment plan. Ordered IgE and allergy panel.   Counseled on trigger avoidance including non pharmacologic measures for possible cold air induced asthma. Reviewed spirometry results with pt. RTC 6 weeks to assess response to therapy. Over 50% of this 48 minute visit was spent in face to face counseling.

## 2020-03-04 ENCOUNTER — OFFICE VISIT (OUTPATIENT)
Dept: NEUROLOGY | Facility: MEDICAL CENTER | Age: 45
End: 2020-03-04
Payer: COMMERCIAL

## 2020-03-04 VITALS
HEART RATE: 84 BPM | BODY MASS INDEX: 28.05 KG/M2 | DIASTOLIC BLOOD PRESSURE: 70 MMHG | SYSTOLIC BLOOD PRESSURE: 128 MMHG | WEIGHT: 211.64 LBS | OXYGEN SATURATION: 98 % | HEIGHT: 73 IN

## 2020-03-04 DIAGNOSIS — E11.42 DIABETIC POLYNEUROPATHY ASSOCIATED WITH TYPE 2 DIABETES MELLITUS (HCC): ICD-10-CM

## 2020-03-04 DIAGNOSIS — M79.7 FIBROMYALGIA: ICD-10-CM

## 2020-03-04 PROCEDURE — 99213 OFFICE O/P EST LOW 20 MIN: CPT | Performed by: PSYCHIATRY & NEUROLOGY

## 2020-03-04 RX ORDER — GALCANEZUMAB 120 MG/ML
1 INJECTION, SOLUTION SUBCUTANEOUS
Qty: 1 PEN | Refills: 11 | Status: SHIPPED | OUTPATIENT
Start: 2020-03-04 | End: 2020-05-05

## 2020-03-04 RX ORDER — NAPROXEN SODIUM 550 MG/1
TABLET ORAL
Qty: 45 TAB | Refills: 6 | Status: SHIPPED | OUTPATIENT
Start: 2020-03-04 | End: 2020-05-05

## 2020-03-04 ASSESSMENT — ENCOUNTER SYMPTOMS
FOCAL WEAKNESS: 0
MEMORY LOSS: 0
HEADACHES: 1
TINGLING: 0
DIZZINESS: 0
BACK PAIN: 1
SENSORY CHANGE: 0
MYALGIAS: 1

## 2020-03-04 ASSESSMENT — FIBROSIS 4 INDEX: FIB4 SCORE: 0.62

## 2020-03-04 ASSESSMENT — PATIENT HEALTH QUESTIONNAIRE - PHQ9: CLINICAL INTERPRETATION OF PHQ2 SCORE: 0

## 2020-03-04 NOTE — PROGRESS NOTES
Subjective:      Ivan Prescott is a 44 y.o. male who presents for follow-up, with a history of migraine headache, diabetes-related polyneuropathy and fibromyalgia.    HPI    Migraine headaches: The headaches actually are very well controlled.  He remains on Emgality 120 mg injections every month, he tolerates the drug well, there are no injection site problems.  He will rarely reach for Anaprox, let alone Imitrex tablets or injection.  In this regard she is quite happy.    He does have occasional headache pain which seems to respond to his wife massaging the scalp.  There were a couple of lumps within the skin over the sites where she massages, there is no tenderness or swelling.  His wife was concerned about any underlying pathology that might be related to the headaches.  These headaches are vastly different than his migraines.    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  The jostling and moving simply makes the headaches worse.  He was given FMLA paperwork through this office as it pertains to the migraines when they occur and disabling him totally but temporarily.    Neuropathy: Stable, he has only numbness and this has not ascended.  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    Fibromyalgia: The symptoms seem to be improved upon, he remains on gabapentin and Elavil through his pain specialist office.  He is also concerned about the symptoms getting worse where he to get back to driving trucks on a regular basis.    Medical, surgical and family histories are reviewed, there are no new drug allergies.  He is on Emgality injections monthly, Imitrex 100 mg tablet or 6 mg injection as needed, Anaprox  mg as needed, Neurontin 1200 mg, 3 times daily, Elavil 25 mg nightly, Xigduo daily, the rest as per the electronic health record, noncontributory from my  "standpoint.    Review of Systems   Constitutional: Negative for malaise/fatigue.   Musculoskeletal: Positive for back pain and myalgias.   Neurological: Positive for headaches. Negative for dizziness, tingling, sensory change and focal weakness.   Psychiatric/Behavioral: Negative for memory loss.   All other systems reviewed and are negative.       Objective:     /70 (BP Location: Left arm, Patient Position: Sitting, BP Cuff Size: Adult)   Pulse 84   Ht 1.854 m (6' 1\")   Wt 96 kg (211 lb 10.3 oz)   SpO2 98%   BMI 27.92 kg/m²      Physical Exam    He appears in no acute distress.  Very pleasant in spirit and demeanor, he is quite cooperative.  There is no malar rash.  The scalp is nontender, diffuse palpation reveals no tender points, there is no scalp hyperalgesia.  The occipital ridge is nontender.  The neck is supple.  Cardiac evaluation is unremarkable.    In quick and cursory fashion, with observation, mental status, cranial nerve, musculoskeletal and coordination evaluations are essentially unchanged.     Assessment/Plan:     1. Chronic migraine  He is doing quite well in this regard, both of us are quite happy.  There is no reason to play with his regimen of Emgality and then Anaprox or Imitrex used as needed.  The head discomfort that he has is not migrainous, simple scalp massage certainly can help these other headaches, the bumps on his scalp have nothing to do with any type of intracranial pathology, nor the headaches themselves and the medications being used.  He and I can follow-up in 1 year.    - naproxen sodium (ANAPROX) 550 MG tablet; TAKE ONE TABLET BY MOUTH DAILY AND ONE TABLET AS NEEDED FOR HEADACHE  Dispense: 45 Tab; Refill: 6  - Galcanezumab-gnlm (EMGALITY) 120 MG/ML Solution Auto-injector; Inject 1 PEN as instructed Q 4 Weeks.  Dispense: 1 PEN; Refill: 11    2. Diabetic polyneuropathy associated with type 2 diabetes mellitus (HCC)  Stable, he has not required symptomatic relief.    3. " Fibromyalgia  A bigger issue at least as it comes to generalized pain, I told him to talk with his pain specialist about FMLA in regards to driving his truck and worsening these pain symptoms.    - naproxen sodium (ANAPROX) 550 MG tablet; TAKE ONE TABLET BY MOUTH DAILY AND ONE TABLET AS NEEDED FOR HEADACHE  Dispense: 45 Tab; Refill: 6    Time: 20-minute spent face-to-face for exam, review, discussion, and education, of this over 50% of the time spent counseling and coordinating care.

## 2020-03-11 ENCOUNTER — APPOINTMENT (OUTPATIENT)
Dept: NEUROLOGY | Facility: MEDICAL CENTER | Age: 45
End: 2020-03-11
Payer: COMMERCIAL

## 2020-03-23 ENCOUNTER — HOSPITAL ENCOUNTER (EMERGENCY)
Facility: MEDICAL CENTER | Age: 45
End: 2020-03-23
Attending: EMERGENCY MEDICINE
Payer: COMMERCIAL

## 2020-03-23 ENCOUNTER — APPOINTMENT (OUTPATIENT)
Dept: RADIOLOGY | Facility: MEDICAL CENTER | Age: 45
End: 2020-03-23
Attending: EMERGENCY MEDICINE
Payer: COMMERCIAL

## 2020-03-23 VITALS
TEMPERATURE: 98.7 F | DIASTOLIC BLOOD PRESSURE: 88 MMHG | WEIGHT: 211 LBS | SYSTOLIC BLOOD PRESSURE: 130 MMHG | BODY MASS INDEX: 27.96 KG/M2 | RESPIRATION RATE: 11 BRPM | HEART RATE: 65 BPM | OXYGEN SATURATION: 100 % | HEIGHT: 73 IN

## 2020-03-23 DIAGNOSIS — R56.9 SEIZURE (HCC): ICD-10-CM

## 2020-03-23 DIAGNOSIS — R51.9 SEVERE HEADACHE: ICD-10-CM

## 2020-03-23 LAB
ALBUMIN SERPL BCP-MCNC: 3.8 G/DL (ref 3.2–4.9)
ALBUMIN/GLOB SERPL: 1.2 G/DL
ALP SERPL-CCNC: 112 U/L (ref 30–99)
ALT SERPL-CCNC: 35 U/L (ref 2–50)
ANION GAP SERPL CALC-SCNC: 13 MMOL/L (ref 7–16)
AST SERPL-CCNC: 33 U/L (ref 12–45)
BASOPHILS # BLD AUTO: 0.4 % (ref 0–1.8)
BASOPHILS # BLD: 0.03 K/UL (ref 0–0.12)
BILIRUB SERPL-MCNC: 0.3 MG/DL (ref 0.1–1.5)
BUN SERPL-MCNC: 15 MG/DL (ref 8–22)
CALCIUM SERPL-MCNC: 9.1 MG/DL (ref 8.5–10.5)
CHLORIDE SERPL-SCNC: 110 MMOL/L (ref 96–112)
CO2 SERPL-SCNC: 21 MMOL/L (ref 20–33)
CREAT SERPL-MCNC: 0.89 MG/DL (ref 0.5–1.4)
EKG IMPRESSION: NORMAL
EOSINOPHIL # BLD AUTO: 0.1 K/UL (ref 0–0.51)
EOSINOPHIL NFR BLD: 1.3 % (ref 0–6.9)
ERYTHROCYTE [DISTWIDTH] IN BLOOD BY AUTOMATED COUNT: 39.6 FL (ref 35.9–50)
ETHANOL BLD-MCNC: <10.1 MG/DL (ref 0–10.1)
GLOBULIN SER CALC-MCNC: 3.1 G/DL (ref 1.9–3.5)
GLUCOSE SERPL-MCNC: 168 MG/DL (ref 65–99)
HCT VFR BLD AUTO: 44.7 % (ref 42–52)
HGB BLD-MCNC: 15.6 G/DL (ref 14–18)
IMM GRANULOCYTES # BLD AUTO: 0.02 K/UL (ref 0–0.11)
IMM GRANULOCYTES NFR BLD AUTO: 0.3 % (ref 0–0.9)
LYMPHOCYTES # BLD AUTO: 1.36 K/UL (ref 1–4.8)
LYMPHOCYTES NFR BLD: 17.9 % (ref 22–41)
MCH RBC QN AUTO: 30.3 PG (ref 27–33)
MCHC RBC AUTO-ENTMCNC: 34.9 G/DL (ref 33.7–35.3)
MCV RBC AUTO: 86.8 FL (ref 81.4–97.8)
MONOCYTES # BLD AUTO: 0.55 K/UL (ref 0–0.85)
MONOCYTES NFR BLD AUTO: 7.2 % (ref 0–13.4)
NEUTROPHILS # BLD AUTO: 5.53 K/UL (ref 1.82–7.42)
NEUTROPHILS NFR BLD: 72.9 % (ref 44–72)
NRBC # BLD AUTO: 0 K/UL
NRBC BLD-RTO: 0 /100 WBC
PLATELET # BLD AUTO: 218 K/UL (ref 164–446)
PMV BLD AUTO: 9.6 FL (ref 9–12.9)
POTASSIUM SERPL-SCNC: 4.4 MMOL/L (ref 3.6–5.5)
PROT SERPL-MCNC: 6.9 G/DL (ref 6–8.2)
RBC # BLD AUTO: 5.15 M/UL (ref 4.7–6.1)
SODIUM SERPL-SCNC: 144 MMOL/L (ref 135–145)
WBC # BLD AUTO: 7.6 K/UL (ref 4.8–10.8)

## 2020-03-23 PROCEDURE — 93005 ELECTROCARDIOGRAM TRACING: CPT | Performed by: EMERGENCY MEDICINE

## 2020-03-23 PROCEDURE — 85025 COMPLETE CBC W/AUTO DIFF WBC: CPT

## 2020-03-23 PROCEDURE — 99284 EMERGENCY DEPT VISIT MOD MDM: CPT

## 2020-03-23 PROCEDURE — 70450 CT HEAD/BRAIN W/O DYE: CPT

## 2020-03-23 PROCEDURE — 700105 HCHG RX REV CODE 258: Performed by: EMERGENCY MEDICINE

## 2020-03-23 PROCEDURE — 80053 COMPREHEN METABOLIC PANEL: CPT

## 2020-03-23 PROCEDURE — 96375 TX/PRO/DX INJ NEW DRUG ADDON: CPT

## 2020-03-23 PROCEDURE — 80307 DRUG TEST PRSMV CHEM ANLYZR: CPT

## 2020-03-23 PROCEDURE — 96372 THER/PROPH/DIAG INJ SC/IM: CPT

## 2020-03-23 PROCEDURE — 93005 ELECTROCARDIOGRAM TRACING: CPT

## 2020-03-23 PROCEDURE — 96374 THER/PROPH/DIAG INJ IV PUSH: CPT

## 2020-03-23 PROCEDURE — 700111 HCHG RX REV CODE 636 W/ 250 OVERRIDE (IP): Performed by: EMERGENCY MEDICINE

## 2020-03-23 RX ORDER — PREGABALIN 300 MG/1
300 CAPSULE ORAL 2 TIMES DAILY
COMMUNITY
End: 2020-05-05 | Stop reason: SDUPTHER

## 2020-03-23 RX ORDER — SUMATRIPTAN 6 MG/.5ML
6 INJECTION, SOLUTION SUBCUTANEOUS ONCE
Status: COMPLETED | OUTPATIENT
Start: 2020-03-23 | End: 2020-03-23

## 2020-03-23 RX ORDER — PROCHLORPERAZINE EDISYLATE 5 MG/ML
10 INJECTION INTRAMUSCULAR; INTRAVENOUS ONCE
Status: COMPLETED | OUTPATIENT
Start: 2020-03-23 | End: 2020-03-23

## 2020-03-23 RX ORDER — SODIUM CHLORIDE 9 MG/ML
1000 INJECTION, SOLUTION INTRAVENOUS ONCE
Status: COMPLETED | OUTPATIENT
Start: 2020-03-23 | End: 2020-03-23

## 2020-03-23 RX ORDER — KETOROLAC TROMETHAMINE 30 MG/ML
30 INJECTION, SOLUTION INTRAMUSCULAR; INTRAVENOUS ONCE
Status: COMPLETED | OUTPATIENT
Start: 2020-03-23 | End: 2020-03-23

## 2020-03-23 RX ORDER — DAPAGLIFLOZIN AND METFORMIN HYDROCHLORIDE 10; 1000 MG/1; MG/1
1 TABLET, FILM COATED, EXTENDED RELEASE ORAL DAILY
COMMUNITY
End: 2020-03-23

## 2020-03-23 RX ORDER — DIPHENHYDRAMINE HYDROCHLORIDE 50 MG/ML
25 INJECTION INTRAMUSCULAR; INTRAVENOUS ONCE
Status: COMPLETED | OUTPATIENT
Start: 2020-03-23 | End: 2020-03-23

## 2020-03-23 RX ORDER — NAPROXEN SODIUM 550 MG/1
TABLET ORAL
COMMUNITY
End: 2020-03-23

## 2020-03-23 RX ORDER — TRAZODONE HYDROCHLORIDE 100 MG/1
100 TABLET ORAL NIGHTLY
COMMUNITY
End: 2020-05-04

## 2020-03-23 RX ORDER — PIOGLITAZONEHYDROCHLORIDE 30 MG/1
30 TABLET ORAL DAILY
Status: SHIPPED | COMMUNITY
End: 2022-01-03

## 2020-03-23 RX ORDER — LEVETIRACETAM 500 MG/1
500 TABLET ORAL 2 TIMES DAILY
Qty: 60 TAB | Refills: 0 | Status: SHIPPED | OUTPATIENT
Start: 2020-03-23 | End: 2020-03-27 | Stop reason: SDUPTHER

## 2020-03-23 RX ADMIN — SUMATRIPTAN 6 MG: 6 INJECTION, SOLUTION SUBCUTANEOUS at 16:36

## 2020-03-23 RX ADMIN — DIPHENHYDRAMINE HYDROCHLORIDE 25 MG: 50 INJECTION, SOLUTION INTRAMUSCULAR; INTRAVENOUS at 16:37

## 2020-03-23 RX ADMIN — KETOROLAC TROMETHAMINE 30 MG: 30 INJECTION, SOLUTION INTRAMUSCULAR at 16:37

## 2020-03-23 RX ADMIN — SODIUM CHLORIDE 1000 ML: 9 INJECTION, SOLUTION INTRAVENOUS at 15:01

## 2020-03-23 RX ADMIN — PROCHLORPERAZINE EDISYLATE 10 MG: 5 INJECTION INTRAMUSCULAR; INTRAVENOUS at 16:37

## 2020-03-23 ASSESSMENT — FIBROSIS 4 INDEX: FIB4 SCORE: 0.62

## 2020-03-23 NOTE — ED TRIAGE NOTES
Pt bib ems for seizure at home, initially refused transport after becoming combative, had another seizure and was transported via EMS. Pt received 5mg versed. Pt postictal at this time, unable to respond to questions, does respond to pain.

## 2020-03-23 NOTE — ED NOTES
Med Rec completed per patient's family    Allergies reviewed  No ORAL antibiotics in last 14 days

## 2020-03-23 NOTE — ED PROVIDER NOTES
ED Provider  Scribed for Gilbert Shah D.O. by Shannon Graf. 3/23/2020  2:40 PM    Means of arrival: EMS  History obtained from: Mother, girlfriend (via phone call)  History limited by: Clinical condition.    CHIEF COMPLAINT  Chief Complaint   Patient presents with   • Seizure       HPI  Ivan Prescott is a 44 y.o. male, with a history of seizures, and migraines, who presents to the ED accompanied by her mother, for evaluation of two seizures that occurred just prior to arrival. Mother reports that his seizures were witnessed by his girlfriend who is on the phone with the mother at this time. Prior to his seizure, his girlfriend reports that he was complaining of a headache as he was getting up from the couch. His girlfriend states that his first seizure lasted approximately 2-3 minutes, and his second seizure occurred just several minutes after his first. She is unsure how long his second seizure lasted, but notes that he had a one minute postictal phase with confusion. En route he was given Versed 5 mg, and the patient is now asleep, but arousal. She reports associated headache, and vomiting. She denies any associated cough, fever, or shortness of breath in the patient. His girlfriend adds that the patient was not prescribed seizure medications.     HPI is limited secondary to patient's clinical condition.     REVIEW OF SYSTEMS  See HPI for further details.     ROS is limited secondary to patient's clinical condition.     PAST MEDICAL HISTORY   has a past medical history of Allergy, Anxiety, Diabetes (HCC), Head ache, and Hypertension.    SOCIAL HISTORY  Social History     Tobacco Use   • Smoking status: Never Smoker   • Smokeless tobacco: Current User     Types: Chew   Substance and Sexual Activity   • Alcohol use: No   • Drug use: No   • Sexual activity: Yes     Partners: Female       SURGICAL HISTORY   has a past surgical history that includes knee arthrotomy and eye surgery.    CURRENT MEDICATIONS  Home  "Medications     Reviewed by Silvia Sanchez PhT (Pharmacy Tech) on 03/23/20 at 1531  Med List Status: Complete   Medication Last Dose Status   gabapentin (NEURONTIN) 600 MG tablet 3/23/2020 Active   Galcanezumab-gnlm (EMGALITY) 120 MG/ML Solution Auto-injector UNK Active   naproxen sodium (ANAPROX) 550 MG tablet PRN Active   pioglitazone (ACTOS) 30 MG Tab 3/23/2020 Active   pregabalin (LYRICA) 300 MG capsule 3/23/2020 Active   sumatriptan (IMITREX) 100 MG tablet PRN Active   SUMAtriptan Succinate 4 MG/0.5ML Solution Auto-injector PRN Active   traZODone (DESYREL) 100 MG Tab 3/22/2020 Active   XIGDUO XR  MG TABLET SR 24 HR 3/23/2020 Active                ALLERGIES  Allergies   Allergen Reactions   • Acetaminophen Swelling     Face swells up for 4 hours       PHYSICAL EXAM  VITAL SIGNS: BP (!) 161/105   Pulse 79   Temp 37.1 °C (98.7 °F) (Temporal)   Resp 12   Ht 1.854 m (6' 1\")   Wt 95.7 kg (211 lb)   SpO2 92%   BMI 27.84 kg/m²   Constitutional: Alert in no apparent distress.  HENT: No signs of trauma, mucous membranes are moist  Eyes: Conjunctiva normal, Non-icteric.   Neck: Normal range of motion, No tenderness, Supple.  Lymphatic: No lymphadenopathy noted.   Cardiovascular: Regular rate and rhythm, no murmurs.   Thorax & Lungs: Normal breath sounds, No respiratory distress, No wheezing, No chest tenderness.   Abdomen: Bowel sounds normal, Soft, No tenderness, No masses, No pulsatile masses. No peritoneal signs.  Skin: Warm, Dry, normal color.   Back: No bony tenderness, No CVA tenderness.   Extremities: No edema, No tenderness, No cyanosis  Musculoskeletal: Good range of motion in all major joints. No tenderness to palpation or major deformities noted.   Neurologic: Arouses to touch, does not track voices, stares indiscriminantly, does not follow instructions, pupils mildly dilated but equal.   Psychiatric: Affect normal, Judgment normal, Mood normal.       MEDICAL DECISION MAKING  This is a 44 y.o. " male who presents with history of seizures, he had seizure which prompted call of EMS and then EMS witnessed a seizure.  I spoke with the wife who states that the patient had a headache prior to this happening so there was some concern for subarachnoid hemorrhage.  This CT was done within 6 hours and showed no subarachnoid hemorrhage or other pathology in the brain.    He was complaining of severe headache when he started becoming less postictal, he was medicated with migraine medications with good results.    He is neurologically intact, no signs of stroke, and he will be discharged home with [facility] for seizures and to follow-up with his neurologist to determine need for continued medications.      DIAGNOSTIC STUDIES / PROCEDURES    EKG  12 Lead EKG interpreted by me shown below.     LABS  Results for orders placed or performed during the hospital encounter of 03/23/20   CBC WITH DIFFERENTIAL   Result Value Ref Range    WBC 7.6 4.8 - 10.8 K/uL    RBC 5.15 4.70 - 6.10 M/uL    Hemoglobin 15.6 14.0 - 18.0 g/dL    Hematocrit 44.7 42.0 - 52.0 %    MCV 86.8 81.4 - 97.8 fL    MCH 30.3 27.0 - 33.0 pg    MCHC 34.9 33.7 - 35.3 g/dL    RDW 39.6 35.9 - 50.0 fL    Platelet Count 218 164 - 446 K/uL    MPV 9.6 9.0 - 12.9 fL    Neutrophils-Polys 72.90 (H) 44.00 - 72.00 %    Lymphocytes 17.90 (L) 22.00 - 41.00 %    Monocytes 7.20 0.00 - 13.40 %    Eosinophils 1.30 0.00 - 6.90 %    Basophils 0.40 0.00 - 1.80 %    Immature Granulocytes 0.30 0.00 - 0.90 %    Nucleated RBC 0.00 /100 WBC    Neutrophils (Absolute) 5.53 1.82 - 7.42 K/uL    Lymphs (Absolute) 1.36 1.00 - 4.80 K/uL    Monos (Absolute) 0.55 0.00 - 0.85 K/uL    Eos (Absolute) 0.10 0.00 - 0.51 K/uL    Baso (Absolute) 0.03 0.00 - 0.12 K/uL    Immature Granulocytes (abs) 0.02 0.00 - 0.11 K/uL    NRBC (Absolute) 0.00 K/uL   COMP METABOLIC PANEL   Result Value Ref Range    Sodium 144 135 - 145 mmol/L    Potassium 4.4 3.6 - 5.5 mmol/L    Chloride 110 96 - 112 mmol/L    Co2 21 20 -  33 mmol/L    Anion Gap 13.0 7.0 - 16.0    Glucose 168 (H) 65 - 99 mg/dL    Bun 15 8 - 22 mg/dL    Creatinine 0.89 0.50 - 1.40 mg/dL    Calcium 9.1 8.5 - 10.5 mg/dL    AST(SGOT) 33 12 - 45 U/L    ALT(SGPT) 35 2 - 50 U/L    Alkaline Phosphatase 112 (H) 30 - 99 U/L    Total Bilirubin 0.3 0.1 - 1.5 mg/dL    Albumin 3.8 3.2 - 4.9 g/dL    Total Protein 6.9 6.0 - 8.2 g/dL    Globulin 3.1 1.9 - 3.5 g/dL    A-G Ratio 1.2 g/dL   DIAGNOSTIC ALCOHOL   Result Value Ref Range    Diagnostic Alcohol <10.1 0.0 - 10.1 mg/dL   ESTIMATED GFR   Result Value Ref Range    GFR If African American >60 >60 mL/min/1.73 m 2    GFR If Non African American >60 >60 mL/min/1.73 m 2   EKG (NOW)   Result Value Ref Range    Report       [facility] Emergency Dept.    Test Date:  2020  Pt Name:    JORGITO VALIENTE               Department: ER  MRN:        [id number]                      Room:        05  Gender:     Male                         Technician: [id number]  :        1975                   Requested By:ER TRIAGE PROTOCOL  Order #:    [id number]                    Reading MD: SLIM GRAY D.O.    Measurements  Intervals                                Axis  Rate:       77                           P:          42  OR:         153                          QRS:        13  QRSD:       101                          T:          6  QT:         388  QTc:        440    Interpretive Statements  Sinus rhythm  No previous ECG available for comparison  Electronically Signed On 3- 18:45:23 PDT by SLIM GRAY D.O.       All labs reviewed by me.    RADIOLOGY  CT-HEAD W/O   Final Result      No acute intracranial abnormality.        The radiologist's interpretations of all radiological studies have been reviewed by me.        COURSE  Pertinent Labs & Imaging studies reviewed. (See chart for details)    2:40 PM - Patient seen and examined at bedside. Discussed plan of care with his mother. The patient will be  "resuscitated with 1L NS IV. Ordered for EKG, Diagnostic alcohol, CMP, CBC, and CT head to evaluate his symptoms.     3:27 PM - Reviewed CT head results as shown above, which revealed no obvious intracranial bleed.    4:21 PM - Patient was reevaluated at bedside. He is awake, alert, and oriented. His neurological exam is normal at this time. Patient is complaining of a severe diffuse \"throbbing\" headache that feels like his migraine headaches.     5:29 PM - Patient was reevaluated at bedside. He is extremely somnolent after migraine medications. His headache is now improving, and we will continue to monitor the patient.    6:45 PM - Patient was reevaluated at bedside. He is arousing more easily and his neurological exam is normal. His headache is almost resolved. Discussed lab and radiology results with the patient and informed them that they were reassuring.The patient will return for new or worsening symptoms and is stable at the time of discharge. He is understanding and agreeable to discharge home.    HYDRATION: Based on the patient's presentation of Dehydration the patient was given IV fluids. IV Hydration was used because oral hydration was not adequate alone. Upon recheck following hydration, the patient was mildly improved.    DISPOSITION:  Patient will be discharged home in stable condition.    FOLLOW UP:  Hunter Jones M.D.  [address] [place] [address] [phone]    In 1 week        OUTPATIENT MEDICATIONS:  New Prescriptions    LEVETIRACETAM (KEPPRA) 500 MG TAB    Take 1 Tab by mouth 2 times a day.         FINAL IMPRESSION  1. Seizure (HCC)    2. Severe headache         IShannon (Tracy), am scribing for, and in the presence of, Gilbert Shah D.O..    Electronically signed by: Shannon Graf (Tracy), 3/23/2020    Gilbert VILLA D.O. personally performed the services described in this documentation, as scribed by Shannon Graf in my presence, and it is both accurate " and complete.    C    The note accurately reflects work and decisions made by me.  Gilbert Shah D.O.  3/23/2020  9:41 PM

## 2020-03-24 NOTE — DISCHARGE INSTRUCTIONS
You are being placed on a seizure medication because he had 2 seizures in 1 day.  This may been triggered by migraine headache, your CT scan is otherwise normal.  Please follow-up with Dr. Fitzgerald to determine need for continued medication.

## 2020-03-27 ENCOUNTER — OFFICE VISIT (OUTPATIENT)
Dept: NEUROLOGY | Facility: MEDICAL CENTER | Age: 45
End: 2020-03-27
Payer: COMMERCIAL

## 2020-03-27 VITALS
OXYGEN SATURATION: 96 % | SYSTOLIC BLOOD PRESSURE: 142 MMHG | WEIGHT: 215.17 LBS | DIASTOLIC BLOOD PRESSURE: 64 MMHG | HEIGHT: 73 IN | HEART RATE: 110 BPM | RESPIRATION RATE: 19 BRPM | BODY MASS INDEX: 28.52 KG/M2 | TEMPERATURE: 97.1 F

## 2020-03-27 DIAGNOSIS — E11.42 DIABETIC POLYNEUROPATHY ASSOCIATED WITH TYPE 2 DIABETES MELLITUS (HCC): ICD-10-CM

## 2020-03-27 DIAGNOSIS — M79.7 FIBROMYALGIA: ICD-10-CM

## 2020-03-27 DIAGNOSIS — G40.209 COMPLEX PARTIAL SEIZURE EVOLVING TO GENERALIZED SEIZURE (HCC): Primary | ICD-10-CM

## 2020-03-27 PROCEDURE — 99215 OFFICE O/P EST HI 40 MIN: CPT | Performed by: PSYCHIATRY & NEUROLOGY

## 2020-03-27 RX ORDER — LEVETIRACETAM 500 MG/1
500 TABLET ORAL 2 TIMES DAILY
Qty: 60 TAB | Refills: 5 | Status: SHIPPED | OUTPATIENT
Start: 2020-03-27 | End: 2020-05-05 | Stop reason: SDUPTHER

## 2020-03-27 ASSESSMENT — ENCOUNTER SYMPTOMS
FALLS: 0
MYALGIAS: 1
SEIZURES: 1
DIZZINESS: 1
SENSORY CHANGE: 1
MEMORY LOSS: 0
TINGLING: 1
HEADACHES: 1

## 2020-03-27 ASSESSMENT — FIBROSIS 4 INDEX: FIB4 SCORE: 1.1

## 2020-03-27 NOTE — TELEPHONE ENCOUNTER
Received request via: Pharmacy    Was the patient seen in the last year in this department? Yes LOV 12/17/19    Does the patient have an active prescription (recently filled or refills available) for medication(s) requested? No

## 2020-03-28 NOTE — PROGRESS NOTES
Subjective:      Ivan Prescott is a 44 y.o. male who presents with both his children, for urgent follow-up, with a history of chronic migraine and a severe diabetes-related polyneuropathy, but who had a seizure recurrence on March 23, 2020, with 2 seizures occurring in rapid succession.    HPI    Seizures: Ivan has a history of seizures, he had suffered from 2 generalized events in January, 2018.  EEG studies done at the time were unremarkable, he was placed on Depakote with efficacy.  Under my care, we stopped the Depakote in September, 2019.    On the night before his seizure recurrence, he had suffered from a rather severe migraine headache, went to sleep taking his usual cocktail of Anaprox and Imitrex.  He woke the next morning with a headache still present, but now a sudden feeling of flushing and heat overtook him, everything felt suddenly quite familiar, he was severely nauseated, and felt like he was moving around on a boat.  He went into the bedroom, lay down, and the next thing he remembers is waking in the emergency room.  His children were present at the time, they found him in the hallway awake and alert and then watched him suffer from a generalized seizure.  The seizure stopped, he began to awaken and he actually conversed with them before he suffered from his second generalized seizure.  He was not incontinent, had not bitten his tongue.    They called 911, the ambulance arrived, the patient began to awaken but did not respond consistently to any questioning.  By the time he was placed in the ambulance, he recognized his daughter and called her by her favorite nickname.  He of course has no recollection of any of this.  In the emergency room, I reviewed the electronic health record, he began to awaken more consistently.  CT scan of the brain was negative for acute pathology, serum alcohol level was negative, CMP and CBC were unremarkable.  UDS was never checked, nor was a magnesium level.   He was loaded with Keppra and sent home on a 500 mg, twice daily regimen.  He still has significant myalgias and arthralgias, still feels quite tired and cognitive slowing remains.    Migraine: The severe headache he had suffered from has attenuated, this was treated when he was in the hospital emergency room.  He is still having head discomfort and a rushing feeling where he feels as if he is on a boat.  This never progresses beyond the symptoms.  He remains on Emgality 120 mg monthly, has Anaprox and Imitrex to use as needed.    Neuropathy: The burning and tingling remains, as has the loss of sensation and instability.  These have not worsened.  His treatment regimen has been changed during this time, gabapentin is being slowly diminished, he had been off the drug briefly, but reinitiated 600 mg, twice daily prior to his seizure.  Lyrica had been added, at 150 mg, twice daily.    Medical, surgical and family histories are reviewed, there are no new drug allergies.  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  He is on Neurontin 600 mg, twice daily, Lyrica 150 mg, twice daily, Anaprox, Imitrex 100 mg tablets or 6 mg injection as needed, Emgality 120 mg injections monthly, Keppra 500 mg, twice daily, Actos, trazodone, Xigduo, and the rest as per the electronic health record, noncontributory from my standpoint.    Review of Systems   Musculoskeletal: Positive for myalgias. Negative for falls.   Neurological: Positive for dizziness, tingling, sensory change, seizures and headaches.   Psychiatric/Behavioral: Negative for memory loss.   All other systems reviewed and are negative.       Objective:     /64 (BP Location: Left arm, Patient Position: Sitting, BP Cuff Size: Adult)   Pulse (!) 110   Temp 36.2 °C  "(97.1 °F) (Temporal)   Resp 19   Ht 1.854 m (6' 1\")   Wt 97.6 kg (215 lb 2.7 oz)   SpO2 96%   BMI 28.39 kg/m²      Physical Exam    He appears in no acute distress.  His vital signs are stable.  He does drift off when he is not question directly.  He is still cooperative.  There is no malar rash or temporal tenderness.  There is no raccoon eye or chamberlain signs.  The neck is supple.  Cardiac evaluation is unremarkable.    He is fully oriented though his mental processing is a little slowed.  There is no aphasia, apraxia, or inattention.    PERRLA/EOMI, visual fields are grossly full, facial movements are symmetric, but there is a subjective decrement of temperature on the right side of the face when compared to the left.  The tongue and uvula are midline, jaw jerk is absent, shoulder shrug is symmetric.    Musculoskeletal exam reveals normal tone, there is a fine tremulousness in both hands when held and sustained posture against gravity.  This attenuates at rest.  There is some weakness in both feet related to pain, otherwise strength is intact.  Reflexes are symmetric, diminished at the ankles bilaterally, slightly brisker in the upper extremities.  The right toe is equivocal, the left is downgoing.    He stands slowly, station is widened.  There is no appendicular dystaxia.  Points are slowed in the feet proportionate to the degree of weakness, also due to pain.    There is a dense, stocking pattern loss of temperature below the knees and vibration below the ankles bilaterally.  There is loss of temperature in the hands.     Assessment/Plan:     1. Complex partial seizure evolving to generalized seizure (HCC)  As described by what the patient remembers, mostly because what his children saw, it sounds as if he had had a spontaneous recurrence of his seizures, this time with what appears to be a focal onset with secondary generalization.  The fact that he still has significant malaise, cognitive slowing, etc. is " consistent with a sustained postictal state, though I suspect this will resolve.  MRI the brain does need to be done to rule out a new focus for seizure activity, the onset suggests a localization.  The nature of all the above was reviewed and full.  I spent a very long time talking about his disorder with his children to reassure them and help them understand what happened.    Keppra 500 mg, twice daily will be continued, MRI of the brain and EEG studies will be ordered.  He knows to call the office for seizure recurrences and to avoid the emergency room if at all possible.  He will likely need medication into the foreseeable future, it sounds as if these 2 events were unprovoked, suggesting underlying cortical abnormality.  We will call him with the results of the testing when they are done, if significant, otherwise we talk specifics at his next office visit.    Even though he was in the middle of a transition from gabapentin to Lyrica, the drugs are being titrated, so it is possible that these may have been a contributing factor with the seizures themselves though I doubt this.    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  He was also told that in the state of [place], he is not allowed to drive for 3 months following an event of loss of consciousness or seizure with loss of voluntary motor control, thus it would not be until June 23, 2020 where he should drive.  He will need to make appropriate arrangements.    - MR-BRAIN-W/O; Future  - REFERRAL TO NEURODIAGNOSTICS (EEG,EP,EMG/NCS/DBS) Modality Requested: EEG-Video  - levETIRAcetam (KEPPRA) 500 MG Tab; Take 1 Tab by mouth 2 times a day.  Dispense: 60 Tab; Refill: 5    2. Chronic migraine  This condition seems to be doing well, I suspect some of the residual problems he is having since his seizures are to some degree migrainous in nature.  I would  anticipate the symptoms resolving over time.  He will continue the Emgality as well as the Imitrex and Anaprox use as needed.    3. Diabetic polyneuropathy associated with type 2 diabetes mellitus (HCC)  He was reassured that getting off the gabapentin and/or staying on Lyrica as a monotherapy drug agent is reasonable and safe to do in the circumstances.    Time: 40-minute spent face-to-face for exam, review, discussion, and education, of this over 50% of the time spent counseling and coordinating care.

## 2020-03-29 RX ORDER — GABAPENTIN 600 MG/1
TABLET ORAL
Qty: 540 TAB | Refills: 0 | Status: SHIPPED | OUTPATIENT
Start: 2020-03-29 | End: 2020-06-29

## 2020-03-30 ENCOUNTER — TELEPHONE (OUTPATIENT)
Dept: MEDICAL GROUP | Facility: PHYSICIAN GROUP | Age: 45
End: 2020-03-30

## 2020-03-30 NOTE — TELEPHONE ENCOUNTER
Phone Number Called: [phone] (home)       Call outcome: Spoke to patient regarding message below.    Message: Patient has been taking imitrex daily last 4-5 days and they are not touching the pain, is there a stronger dose he can try? He is having an MRI 4/9/2020. Please advise

## 2020-03-30 NOTE — TELEPHONE ENCOUNTER
Requested Prescriptions     Signed Prescriptions Disp Refills   • gabapentin (NEURONTIN) 600 MG tablet 540 Tab 0     Sig: TAKE 2 TABLETS BY MOUTH 3 TIMES A DAY     Authorizing Provider: EFRAIN MILLER A.P.R.N.

## 2020-03-30 NOTE — TELEPHONE ENCOUNTER
Pt called this morning stating he needs a refill for gabapentin and has questions about the dosing of his sumatriptan.     Can we follow up with him regarding these questions? Thank you!

## 2020-03-30 NOTE — TELEPHONE ENCOUNTER
Please have meche reach out to neurology as they have just adjusted his medications and he should discuss with them  ODILON Oshea.

## 2020-03-31 ENCOUNTER — TELEPHONE (OUTPATIENT)
Dept: NEUROLOGY | Facility: MEDICAL CENTER | Age: 45
End: 2020-03-31

## 2020-04-01 NOTE — TELEPHONE ENCOUNTER
Patient called on 3/31/2020 and left a voice message for us to call him back. It was in regards to some of his medications and notes that his work needed, I have reached out to this patient and I have left him a voice message for him to call us back ASAP.

## 2020-04-08 ENCOUNTER — TELEPHONE (OUTPATIENT)
Dept: NEUROLOGY | Facility: MEDICAL CENTER | Age: 45
End: 2020-04-08

## 2020-04-08 NOTE — LETTER
2020        Ivan Prescott  : 1975    To whom it may concern:    Mr. 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  If there are any questions in regards to this matter, my office can be contacted.    Sincerely:        Hunter Jones M.D.

## 2020-04-08 NOTE — TELEPHONE ENCOUNTER
Called patient about letter he statd that he would like this letter mailed to him, letter is mailed to him and he asked if he can get it on my chart, I stated yes you are able to if not I will mail it to him.

## 2020-04-08 NOTE — TELEPHONE ENCOUNTER
Dr. Jones,    Patient called on 4/7/2020 and stated that he is needing a note for his work and as well as restrictions on what he can and can not do.

## 2020-04-09 ENCOUNTER — TELEPHONE (OUTPATIENT)
Dept: NEUROLOGY | Facility: MEDICAL CENTER | Age: 45
End: 2020-04-09

## 2020-04-09 ENCOUNTER — HOSPITAL ENCOUNTER (OUTPATIENT)
Dept: RADIOLOGY | Facility: MEDICAL CENTER | Age: 45
End: 2020-04-09
Attending: PSYCHIATRY & NEUROLOGY
Payer: COMMERCIAL

## 2020-04-09 DIAGNOSIS — R93.0 ABNORMAL MRI OF HEAD: ICD-10-CM

## 2020-04-09 DIAGNOSIS — G40.209 COMPLEX PARTIAL SEIZURE EVOLVING TO GENERALIZED SEIZURE (HCC): ICD-10-CM

## 2020-04-09 PROCEDURE — 70551 MRI BRAIN STEM W/O DYE: CPT

## 2020-04-09 NOTE — TELEPHONE ENCOUNTER
Junior, please call Leobardo and let him know that the MRI scan of his head did show a small area in his brain with there is still some swelling that has followed his seizure.  I need to repeat the scan in a couple of weeks, with and without contrast at this time, to make sure things are improving.  There are no other areas of significant abnormality, he has not had a stroke, hemorrhage, etc.  These changes are not uncommon, but follow-up studies are needed to make sure things resolve as would be expected.  It certainly would explain the seizure recurrence that he has had.    I entered the order for the MRI scan into [facility].  Make sure he knows he needs to get blood tests for kidney profile since we are using contrast.

## 2020-04-12 ENCOUNTER — PATIENT MESSAGE (OUTPATIENT)
Dept: MEDICAL GROUP | Facility: PHYSICIAN GROUP | Age: 45
End: 2020-04-12

## 2020-04-13 ENCOUNTER — PATIENT MESSAGE (OUTPATIENT)
Dept: MEDICAL GROUP | Facility: PHYSICIAN GROUP | Age: 45
End: 2020-04-13

## 2020-04-13 ENCOUNTER — TELEPHONE (OUTPATIENT)
Dept: NEUROLOGY | Facility: MEDICAL CENTER | Age: 45
End: 2020-04-13

## 2020-04-13 NOTE — TELEPHONE ENCOUNTER
From: Ivan Prescott  To: NHAN Oshea  Sent: 4/13/2020 9:11 AM PDT  Subject: Non-Urgent Medical Question    April 23rd 8 am at your place for second mri.      ----- Message -----   From:NHAN Oshea   Sent:4/13/2020 8:29 AM PDT   To:Ivan Prescott   Subject:RE: Non-Urgent Medical Question    Ivan  Trazodone 100 mg is the max dose. Sorry about that.  The MRI impression shows an area in the brain that has some focal edema (swelling). This could be residual from your recent seizure, but the radiologist recommends a scan with contrast.  Your neurologist would need to order that.  Hope that helps.  NHAN Oshea      ----- Message -----   From:Ivan Prescott   Sent:4/12/2020 12:03 AM PDT   To:NHAN Oshea   Subject:Non-Urgent Medical Question    Can I get a simple answer about my mri results please.

## 2020-04-13 NOTE — TELEPHONE ENCOUNTER
"I do not understand his question about \"increasing the MG\"?  If he means Emgality, no, this cannot be increased further.  "

## 2020-04-13 NOTE — TELEPHONE ENCOUNTER
----- Message from Ivan Prescott sent at 4/12/2020 12:05 AM PDT -----  Regarding: Non-Urgent Medical Question  Contact: [phone]  sumatriptan 100 MG     Any way I can up the MG please.

## 2020-04-14 ENCOUNTER — TELEPHONE (OUTPATIENT)
Dept: NEUROLOGY | Facility: MEDICAL CENTER | Age: 45
End: 2020-04-14

## 2020-04-14 NOTE — TELEPHONE ENCOUNTER
----- Message from Ivan Prescott sent at 4/13/2020 10:42 AM PDT -----  Regarding: RE:Reply from Dr. Jones  Contact: [phone]  100 mg is not doing it,  can you up to 200?

## 2020-04-23 ENCOUNTER — HOSPITAL ENCOUNTER (OUTPATIENT)
Dept: RADIOLOGY | Facility: MEDICAL CENTER | Age: 45
End: 2020-04-23
Attending: PSYCHIATRY & NEUROLOGY
Payer: COMMERCIAL

## 2020-04-23 DIAGNOSIS — R93.0 ABNORMAL MRI OF HEAD: ICD-10-CM

## 2020-04-23 PROCEDURE — 70553 MRI BRAIN STEM W/O & W/DYE: CPT

## 2020-04-23 PROCEDURE — A9576 INJ PROHANCE MULTIPACK: HCPCS | Performed by: PSYCHIATRY & NEUROLOGY

## 2020-04-23 PROCEDURE — 700117 HCHG RX CONTRAST REV CODE 255: Performed by: PSYCHIATRY & NEUROLOGY

## 2020-04-23 RX ADMIN — GADOTERIDOL 20 ML: 279.3 INJECTION, SOLUTION INTRAVENOUS at 09:18

## 2020-04-25 ENCOUNTER — TELEPHONE (OUTPATIENT)
Dept: NEUROLOGY | Facility: MEDICAL CENTER | Age: 45
End: 2020-04-25

## 2020-04-25 NOTE — TELEPHONE ENCOUNTER
Please call the patient and let him know that the repeat MRI scan of his brain is stable, the area of swelling/scarring previously seen, has not gotten bigger, I am very pleased.  Tell him that there are no new areas of abnormality.  I am pleased about this.  We will need to repeat an MRI scan in about 6 months to make sure things remain stable.

## 2020-04-27 ENCOUNTER — PATIENT MESSAGE (OUTPATIENT)
Dept: MEDICAL GROUP | Facility: PHYSICIAN GROUP | Age: 45
End: 2020-04-27

## 2020-04-27 NOTE — TELEPHONE ENCOUNTER
Called patient about MRI scan, he stated that he wanted to make an apt. I have put him on 5/5/2020 to talk about his Rx's (Sumatriptan) to see if he can go up on it. And has a whole bunch of questions. So I ended up setting him up for an apt.

## 2020-05-04 RX ORDER — TRAZODONE HYDROCHLORIDE 100 MG/1
TABLET ORAL
Qty: 90 TAB | Refills: 1 | Status: SHIPPED | OUTPATIENT
Start: 2020-05-04 | End: 2020-05-05

## 2020-05-04 NOTE — TELEPHONE ENCOUNTER
Requested Prescriptions     Signed Prescriptions Disp Refills   • traZODone (DESYREL) 100 MG Tab 90 Tab 1     Sig: TAKE 1 TABLET BY MOUTH AT BEDTIME AS NEEDED FOR SLEEP     Authorizing Provider: EFRAIN MILLER A.P.R.N.

## 2020-05-05 ENCOUNTER — OFFICE VISIT (OUTPATIENT)
Dept: NEUROLOGY | Facility: MEDICAL CENTER | Age: 45
End: 2020-05-05
Payer: COMMERCIAL

## 2020-05-05 ENCOUNTER — PATIENT MESSAGE (OUTPATIENT)
Dept: MEDICAL GROUP | Facility: PHYSICIAN GROUP | Age: 45
End: 2020-05-05

## 2020-05-05 VITALS
SYSTOLIC BLOOD PRESSURE: 102 MMHG | HEIGHT: 73 IN | WEIGHT: 205 LBS | BODY MASS INDEX: 27.17 KG/M2 | HEART RATE: 110 BPM | OXYGEN SATURATION: 98 % | DIASTOLIC BLOOD PRESSURE: 74 MMHG

## 2020-05-05 DIAGNOSIS — R42 DIZZINESS: Primary | ICD-10-CM

## 2020-05-05 DIAGNOSIS — G40.209 COMPLEX PARTIAL SEIZURE EVOLVING TO GENERALIZED SEIZURE (HCC): ICD-10-CM

## 2020-05-05 PROCEDURE — 99214 OFFICE O/P EST MOD 30 MIN: CPT | Performed by: PSYCHIATRY & NEUROLOGY

## 2020-05-05 RX ORDER — LEVETIRACETAM 500 MG/1
500 TABLET ORAL 2 TIMES DAILY
Qty: 60 TAB | Refills: 5 | Status: SHIPPED | OUTPATIENT
Start: 2020-05-05 | End: 2020-09-23 | Stop reason: SDUPTHER

## 2020-05-05 RX ORDER — PREGABALIN 300 MG/1
150 CAPSULE ORAL 2 TIMES DAILY
Qty: 62 CAP | Refills: 0 | Status: SHIPPED | DISCHARGE
Start: 2020-05-05 | End: 2020-06-05

## 2020-05-05 RX ORDER — GALCANEZUMAB 120 MG/ML
1 INJECTION, SOLUTION SUBCUTANEOUS
Qty: 1 PEN | Refills: 0 | OUTPATIENT
Start: 2020-05-05 | End: 2020-05-11 | Stop reason: SDUPTHER

## 2020-05-05 ASSESSMENT — ENCOUNTER SYMPTOMS
MEMORY LOSS: 0
PHOTOPHOBIA: 1
HEADACHES: 1

## 2020-05-05 ASSESSMENT — FIBROSIS 4 INDEX: FIB4 SCORE: 1.1

## 2020-05-05 NOTE — Clinical Note
Katerine, I think this gentleman may need referral to an endocrinologist, if for no other reason to make sure that we are not dealing with some type of adrenal hyperplasia/pheochromocytoma.  He is having episodes of dizziness with flushing, unsteadiness, etc.  These are not neurologically based, and I doubt it is a dysautonomia related to his diabetes.  LAURA.

## 2020-05-05 NOTE — PROGRESS NOTES
"Subjective:      Ivan Prescott is a 44 y.o. male who presents for urgent follow-up, now with episodes of sudden onset heated sensation with flushing, dizziness and balance difficulties, associated with worsening migraine headaches but well controlled partial seizures.    HPI    Dizziness with flushing and unsteadiness: The episodes have started spontaneously, he describes sudden onset of a heated sensation with flushed feelings across the face, no diaphoresis, chest pain or palpitations, but severe dizziness and an unsteadiness as if he is walking \"on a boat\".  The episodes are then followed by severe nausea and vomiting.  Once he vomits things improve noticeably.  The episodes resolve after a relatively short interval, there are no sequelae, but then they recur.  He is much more easily fatigued, he notes much more malaise between these episodes.    He cannot provoke the episodes themselves, they start whether or not he is seated, supine or even standing.  He checks his sugars, they have never been low when the episodes start.  He cannot bring when on if he stands too quickly.  He has never lost consciousness, he is awake and alert.  He has no seizures associated with the symptoms, the episodes are not associated with increasing headaches or actual migraine attacks themselves.    Complex partial seizures: Still on Keppra 500 mg, twice daily, he has had no seizure recurrences since last seen 6 weeks ago.  Repeat MRI scans were done on April 9 and then again 23, 2020.  There was no enhancement but a persistent, unchanged lesion in the right mesial temporal lobe adjacent to the temporal horn of the lateral ventricle consistent with edema.  There was no hemorrhage.  The brain substance itself was otherwise normal.  The seizures themselves have never been associated with these complaints as above.    Migraine headaches: These have continued to worsen, originally on Aimovig, the drug lost benefit, we then tried " "Emgality, and again benefit was lost, he stopped the drug months ago.  Only Imitrex injections seem to provide some benefit, the tablets no longer do.  Anaprox DS never provided additional benefit when it was tried.  He is still having headaches averaging anywhere from 2-4 days in a week.    Medical, surgical and family histories are reviewed, there are no new drug allergies.  He denies major issues with urinary retention or severe and worsening constipation, orthostatic dizziness and actual syncope, early satiety with nausea and vomiting, etc.    He is on Imitrex 100 mg tablets and 6 mg injections as needed, Keppra 500 mg, twice daily, Neurontin 600 mg daily, Lyrica 150 mg, twice daily, Xigduo and Actos.    Review of Systems   Constitutional: Positive for malaise/fatigue.   Eyes: Positive for photophobia.   Neurological: Positive for headaches.   Psychiatric/Behavioral: Negative for memory loss.   All other systems reviewed and are negative.       Objective:     /74 (BP Location: Left arm, Patient Position: Sitting, BP Cuff Size: Adult)   Pulse (!) 110   Ht 1.854 m (6' 1\")   Wt 93 kg (205 lb)   SpO2 98%   BMI 27.05 kg/m²      Physical Exam    He appears in notable distress, he is complaining of some headache, no dizziness, but notable photophobia.  He is still cooperative.  Vital signs show blood pressure 102/74 but pulse is elevated at 110 with a regular rhythm.  There is no malar rash.  The neck is supple.    Mentation is intact.  PERRLA/EOMI, visual fields are grossly full, there is no bulbar dysfunction and facial movements are symmetric.  Musculoskeletal exam reveals no tremor.  He moves all extremities equally.    He stands slowly, he does look unsteady, station is slightly widened.  There is no appendicular dystaxia, fine motor control with the hands and feet are intact.     Assessment/Plan:     1. Dizziness, ataxia, and flushing  I doubt we are seeing a primary neurologic issue, imaging of the " brain has ruled out posterior fossa structural pathologies, this almost sounds like a catecholamine-induced process?  It is not because of hypoglycemia, he checks his sugars, it is not ictal in nature nor do I believe related to a dysautonomia from his diabetes.  It might be appropriate for endocrine consultation, urine metanephrines and catecholamines will be offered, but subsequent work-up I will defer to his primary care provider, Ms. Anne.    - CATECHOLAMINE+VMA, 24-HR URINE  - METANEPHRINES, PHEOCHROMOCYT    2. Complex partial seizure evolving to generalized seizure (HCC)  Clinically stable, not a contributing factor to his primary complaints above.  We will continue Keppra unchanged.  Eventually a repeat EEG study will be performed.  The abnormalities on his MRI certainly correlate with the symptoms associated with his seizures, we will repeat a study in about 5 months to make sure things are stable structurally.    - levETIRAcetam (KEPPRA) 500 MG Tab; Take 1 Tab by mouth 2 times a day.  Dispense: 60 Tab; Refill: 5    3. Chronic migraine  I will restart Emgality to be sure that the drug has lost efficacy.  For rescue, Nurtec OCT 75 mg tablets will be used, samples were provided, taken at headache onset, this provides pain relief for upwards of 2 days, so it can be repeated once in 24 hours.  He was told that Imitrex can be used if this drug provides limited benefit.    - NON SPECIFIED; Nurtec OCT 75mg tabs. 1 tab at headache onset; repeat once in 24-48 hours prn  Dispense: 2 Each; Refill: 0  - Galcanezumab-gnlm (EMGALITY) 120 MG/ML Solution Auto-injector; Inject 1 PEN as instructed Q 4 Weeks.  Dispense: 1 PEN; Refill: 0    Time: 25-minute spent face-to-face for exam, review, discussion, and education, this over 50% of the time spent counseling and coordinating care.

## 2020-05-11 ENCOUNTER — OFFICE VISIT (OUTPATIENT)
Dept: MEDICAL GROUP | Facility: PHYSICIAN GROUP | Age: 45
End: 2020-05-11
Payer: COMMERCIAL

## 2020-05-11 ENCOUNTER — TELEPHONE (OUTPATIENT)
Dept: MEDICAL GROUP | Facility: PHYSICIAN GROUP | Age: 45
End: 2020-05-11

## 2020-05-11 VITALS
SYSTOLIC BLOOD PRESSURE: 106 MMHG | DIASTOLIC BLOOD PRESSURE: 80 MMHG | BODY MASS INDEX: 27.7 KG/M2 | TEMPERATURE: 98.4 F | OXYGEN SATURATION: 97 % | HEART RATE: 96 BPM | HEIGHT: 73 IN | WEIGHT: 209 LBS | RESPIRATION RATE: 16 BRPM

## 2020-05-11 DIAGNOSIS — H53.8 BLURRY VISION, BILATERAL: ICD-10-CM

## 2020-05-11 DIAGNOSIS — F51.01 PRIMARY INSOMNIA: ICD-10-CM

## 2020-05-11 DIAGNOSIS — G25.81 RLS (RESTLESS LEGS SYNDROME): ICD-10-CM

## 2020-05-11 DIAGNOSIS — F48.9 TENSION: ICD-10-CM

## 2020-05-11 DIAGNOSIS — R42 DIZZINESS: ICD-10-CM

## 2020-05-11 DIAGNOSIS — E11.42 DIABETIC POLYNEUROPATHY ASSOCIATED WITH TYPE 2 DIABETES MELLITUS (HCC): ICD-10-CM

## 2020-05-11 DIAGNOSIS — G40.209 COMPLEX PARTIAL SEIZURE EVOLVING TO GENERALIZED SEIZURE (HCC): ICD-10-CM

## 2020-05-11 PROBLEM — L98.9 CHEST SKIN LESION: Status: RESOLVED | Noted: 2017-03-22 | Resolved: 2020-05-11

## 2020-05-11 PROBLEM — J02.9 SORE THROAT: Status: RESOLVED | Noted: 2019-12-17 | Resolved: 2020-05-11

## 2020-05-11 PROCEDURE — 99214 OFFICE O/P EST MOD 30 MIN: CPT | Performed by: NURSE PRACTITIONER

## 2020-05-11 RX ORDER — GALCANEZUMAB 120 MG/ML
1 INJECTION, SOLUTION SUBCUTANEOUS
Qty: 1 PEN | Refills: 6 | Status: SHIPPED | OUTPATIENT
Start: 2020-05-11 | End: 2020-09-23 | Stop reason: SDUPTHER

## 2020-05-11 RX ORDER — TRAZODONE HYDROCHLORIDE 100 MG/1
100 TABLET ORAL NIGHTLY
COMMUNITY
End: 2020-10-26

## 2020-05-11 RX ORDER — ROPINIROLE 1 MG/1
1 TABLET, FILM COATED ORAL NIGHTLY PRN
Qty: 30 TAB | Refills: 3 | Status: SHIPPED | OUTPATIENT
Start: 2020-05-11 | End: 2020-08-26

## 2020-05-11 ASSESSMENT — FIBROSIS 4 INDEX: FIB4 SCORE: 1.1

## 2020-05-11 NOTE — ASSESSMENT & PLAN NOTE
Reports some chronic tension on forehead, increases vertigo.  Has been using tension on neck and back.  .  Uses accupressure and that seems to help.

## 2020-05-11 NOTE — PROGRESS NOTES
Chief Complaint   Patient presents with   • Follow-Up   • Dizziness       Subjective:   Ivan Prescott is a 44 y.o. male here today for evaluation and management of:    Complex partial seizure evolving to generalized seizure (HCC)  Patient has had recent seizure and recent visit with Dr. Fitzgerald, neuro who wants to have patient seen by endocrinology to rule out pheochromocytoma due to his ongoing symptoms that are not seizure related.      Primary insomnia  Taking two 100 mg trazodone with some improvement of sleep.  Reports poor sleep.  Able to fall asleep, but wakes up 1-2 hours later.  No manic symptoms reported.       Uncontrolled type 2 diabetes mellitus with diabetic neuropathy, without long-term current use of insulin ([facility])  Taking actos, xigduo XR daily.      Diabetic polyneuropathy associated with type 2 diabetes mellitus ([facility])  Working on diet and exercise at home.  xigduo and actos daily.     Blurry vision, bilateral  Recent change to trifocal lenses.  Some dizziness ongoing.  Has not tried any allergy medication.     RLS (restless legs syndrome)  wilil try some requip for this issue. Nightly arms and legs twitching  Interrupting sleep.     Tension  Reports some chronic tension on forehead, increases vertigo.  Has been using tension on neck and back.  .  Uses accupressure and that seems to help.              Current medicines (including changes today)  Current Outpatient Medications   Medication Sig Dispense Refill   • traZODone (DESYREL) 100 MG Tab Take 100 mg by mouth every evening.     • ROPINIRole (REQUIP) 1 MG Tab Take 1 Tab by mouth at bedtime as needed. 30 Tab 3   • Galcanezumab-gnlm (EMGALITY) 120 MG/ML Solution Auto-injector Inject 1 PEN as instructed Q 4 Weeks. 1 PEN 6   • pregabalin (LYRICA) 300 MG capsule Take 1 Cap by mouth 2 times a day for 31 days. 62 Cap 0   • levETIRAcetam (KEPPRA) 500 MG Tab Take 1 Tab by mouth 2 times a day. 60 Tab 5   • gabapentin (NEURONTIN) 600 MG tablet TAKE 2  "TABLETS BY MOUTH 3 TIMES A  Tab 0   • pioglitazone (ACTOS) 30 MG Tab Take 30 mg by mouth every day.     • sumatriptan (IMITREX) 100 MG tablet 1 tab at headache onset; repeat in1 hour prn 9 Tab 6   • XIGDUO XR  MG TABLET SR 24 HR TAKE 1 TABLET BY MOUTH EVERY DAY IN THE MORNING 30 Tab 11   • SUMAtriptan Succinate 4 MG/0.5ML Solution Auto-injector Inject 1 Application as instructed Once PRN for up to 1 dose. Inject once at headache onset; repeat in 1 hour prn 0.5 mL 6   • NON SPECIFIED Nurtec OCT 75mg tabs. 1 tab at headache onset; repeat once in 24-48 hours prn 2 Each 0     No current facility-administered medications for this visit.      He  has a past medical history of Allergy, Anxiety, Diabetes (HCC), Head ache, and Hypertension.    ROS as stated in hpi  No chest pain, no shortness of breath, no abdominal pain       Objective:     /80 (BP Location: Left arm, Patient Position: Sitting)   Pulse 96   Temp 36.9 °C (98.4 °F) (Temporal)   Resp 16   Ht 1.854 m (6' 1\")   Wt 94.8 kg (209 lb)   SpO2 97%  Body mass index is 27.57 kg/m². stable.   Physical Exam:  Constitutional: Alert, no distress.  Skin: Warm, dry, good turgor,no cyanosis, no rashes in visible areas.  Eye: Equal, round and reactive, conjunctiva clear, lids normal.  Ears: No tenderness, no discharge.  External canals are without any significant edema or erythema.  Tympanic membranes are without any inflammation, no effusion.  Gross auditory acuity is intact.  Nose: symmetrical without tenderness, no discharge.  Mouth/Throat: lips without lesion.  Oropharynx clear.  Throat without erythema, exudates or tonsillar enlargement.  Neck: Trachea midline, no masses, no obvious thyroid enlargement.. No cervical or supraclavicular lymphadenopathy. Range of motion within normal limits.  Neuro: Cranial nerves 2-12 grossly intact.  No sensory deficit.  Respiratory: Unlabored respiratory effort, lungs clear to auscultation, no wheezes, no " antonio.  Cardiovascular: Normal S1, S2, no murmur, no edema.  Abdomen: Soft, non-tender, no masses, no guarding,  no hepatosplenomegaly.  Psych: Alert and oriented x3, normal affect and mood and judgement.        Assessment and Plan:   The following treatment plan was discussed    1. Complex partial seizure evolving to generalized seizure (HCC)  Chronic, ongoing, stable, followed by Dr. Fitzgerald.  Keppra bid.      2. Blurry vision, bilateral  Chronic, ongoing, not improving.  Dr. Fitzgerald requesting referral to endocrinology to rule out pheochromocytoma or other endocrinology issue.  Also has diabetes.   - REFERRAL TO ENDOCRINOLOGY    3. Dizziness  See #2  - REFERRAL TO ENDOCRINOLOGY    4. Primary insomnia  Chronic, ongoing. Improved with trazodone.  Will add requip to help with restless leg.  Monitor and follow.     5. Uncontrolled type 2 diabetes mellitus with diabetic neuropathy, without long-term current use of insulin (HCC)  Chornic,, ongoing, much improved.  Xiguduo and Actos daily.  Will re-establish with endocrinology    6. Diabetic polyneuropathy associated with type 2 diabetes mellitus (HCC)  See #5.  Iimproved with lyrica.  Lower dose of gabapentin now and doing well.     7. RLS (restless legs syndrome)  Chronic, ongoing, worsening.  Will try requip.  Advised good hydration and possible daily magnesium.  Monitor and follow.     8. Chronic migraine  Chronic, ongoing. Improving.  Refill on meds today.  Neuro is following.   - Galcanezumab-gnlm (EMGALITY) 120 MG/ML Solution Auto-injector; Inject 1 PEN as instructed Q 4 Weeks.  Dispense: 1 PEN; Refill: 6    9. Tension  New problem with tension on forehead.  Responds to accupressure.  May consider accupuncture, patient is possible losing his insurance.  Will wait.  Monitor and follow.       Followup: Return in about 6 months (around 11/11/2020).         Educated in proper administration of medication(s) ordered today including safety, possible SE, risks, benefits,  rationale and alternatives to therapy.     Please note that this dictation was created using voice recognition software. I have made every reasonable attempt to correct obvious errors, but I expect that there are errors of grammar and possibly content that I did not discover before finalizing the note.

## 2020-05-11 NOTE — ASSESSMENT & PLAN NOTE
Taking two 100 mg trazodone with some improvement of sleep.  Reports poor sleep.  Able to fall asleep, but wakes up 1-2 hours later.  No manic symptoms reported.

## 2020-05-11 NOTE — TELEPHONE ENCOUNTER
DOCUMENTATION OF PAR STATUS:    1. Name of Medication & Dose: Galcanezumab-gnlm (EMGALITY) 120 MG/ML Solution Auto-injector     2. Name of Prescription Coverage Company & phone #: [employer]    3. Date Prior Auth Submitted: 05/11/2020    4. What information was given to obtain insurance decision? Dx Code and Tried and Failed    5. Prior Auth Status? Pending    6. Patient Notified: N\A      Ivan Prescott (Key: [id number])     [employer] is reviewing your PA request. Typically an electronic response will be received within 72 hours. To check for an update later, open this request from your dashboard.  You may close this dialog and return to your dashboard to perform other tasks.

## 2020-05-11 NOTE — ASSESSMENT & PLAN NOTE
Patient has had recent seizure and recent visit with Dr. Fitzgerald, neuro who wants to have patient seen by endocrinology to rule out pheochromocytoma due to his ongoing symptoms that are not seizure related.

## 2020-05-11 NOTE — ASSESSMENT & PLAN NOTE
Recent change to trifocal lenses.  Some dizziness ongoing.  Has not tried any allergy medication.

## 2020-05-13 ENCOUNTER — PATIENT MESSAGE (OUTPATIENT)
Dept: MEDICAL GROUP | Facility: PHYSICIAN GROUP | Age: 45
End: 2020-05-13

## 2020-05-13 RX ORDER — MECLIZINE HYDROCHLORIDE 25 MG/1
25 TABLET ORAL 3 TIMES DAILY PRN
Qty: 90 TAB | Refills: 1 | Status: SHIPPED | OUTPATIENT
Start: 2020-05-13 | End: 2020-10-12 | Stop reason: SDUPTHER

## 2020-05-13 NOTE — TELEPHONE ENCOUNTER
From: Ivan Prescott  To: NHAN Oshea  Sent: 5/13/2020 8:10 AM PDT  Subject: Non-Urgent Medical Question    Not to my knowledge      ----- Message -----   From:NHAN [place]sea   Sent:5/13/2020 8:08 AM PDT   To:Ivan Prescott   Subject:RE: Non-Urgent Medical Question    Ivan  Have you ever tried meclizine for the vertigo? Let me know, it may be a help.  NHAN Oshea      ----- Message -----   From:Ivan Prescott   Sent:5/13/2020 7:29 AM PDT   To:NHAN Oshea   Subject:Non-Urgent Medical Question    Wednesday August 5th to see them. The lady asked why am I even booking this as my symptoms don't look like anything they can help with. I really can't go day to day with this dizzy, puking.

## 2020-05-13 NOTE — TELEPHONE ENCOUNTER
FINAL PRIOR AUTHORIZATION STATUS:    1.  Name of Medication & Dose: Galcanezumab-gnlm (EMGALITY) 120 MG/ML Solution Auto-injector     2. Prior Auth Status: Denied.  Reason: Did not meet clinical requirements    3. Action Taken: Pharmacy Notified: N\A Patient Notified: N\A

## 2020-06-29 DIAGNOSIS — M79.7 FIBROMYALGIA: ICD-10-CM

## 2020-06-29 RX ORDER — GABAPENTIN 600 MG/1
TABLET ORAL
Qty: 540 TAB | Refills: 2 | Status: SHIPPED | OUTPATIENT
Start: 2020-06-29 | End: 2020-09-23 | Stop reason: SDUPTHER

## 2020-08-24 ENCOUNTER — APPOINTMENT (OUTPATIENT)
Dept: NEUROLOGY | Facility: MEDICAL CENTER | Age: 45
End: 2020-08-24
Payer: COMMERCIAL

## 2020-08-26 RX ORDER — ROPINIROLE 1 MG/1
TABLET, FILM COATED ORAL
Qty: 90 TAB | Refills: 1 | Status: SHIPPED | OUTPATIENT
Start: 2020-08-26 | End: 2020-09-23 | Stop reason: SDUPTHER

## 2020-08-26 NOTE — TELEPHONE ENCOUNTER
Requested Prescriptions     Signed Prescriptions Disp Refills   • ROPINIRole (REQUIP) 1 MG Tab 90 Tab 1     Sig: TAKE 1 TABLET BY MOUTH EVERY DAY AT BEDTIME AS NEEDED     Authorizing Provider: EFRAIN MILLER A.P.R.N.

## 2020-08-26 NOTE — TELEPHONE ENCOUNTER
Received request via: Pharmacy    Was the patient seen in the last year in this department? Yes LOV  05/11/2020    Does the patient have an active prescription (recently filled or refills available) for medication(s) requested? No

## 2020-08-28 ENCOUNTER — TELEPHONE (OUTPATIENT)
Dept: MEDICAL GROUP | Facility: PHYSICIAN GROUP | Age: 45
End: 2020-08-28

## 2020-08-28 NOTE — TELEPHONE ENCOUNTER
1. Caller Name: Ivan Prescott                        Call Back Number: [phone] (home)       How would the patient prefer to be contacted with a response:     Pt stated he refused service to be transported to the hospital. Per Pt he was told he had an Seizure Pt disagree but he does feel foggy and Vertigo.  Pt would like a call back either by Mamie or Katerine.

## 2020-08-31 ENCOUNTER — TELEPHONE (OUTPATIENT)
Dept: NEUROLOGY | Facility: MEDICAL CENTER | Age: 45
End: 2020-08-31

## 2020-08-31 NOTE — TELEPHONE ENCOUNTER
Dr. Jones,    Patient called on 8/28/2020 and stated that he had refused medical Services, the family states that he had a seizure and emergency medical services were called, the family wanted him to call us to tell us that he refused medical services, the patient him self states that he did not have a seizure but, the family states he did that's why medical services were called.   Do you want to see this patient here in office?   Please advise.

## 2020-08-31 NOTE — TELEPHONE ENCOUNTER
"I am assuming that the medical services he refused were the ones that were offered when his family had called him in because he had had a \"seizure\".  You made it sound as if he was refusing medical services which then led into a seizure, followed by continuing refusal of services once they arrived.     I am assuming that he has been compliant with medications, this always needs to be checked upon.  Find out if there have been any other mitigating circumstances, like did they think to check his sugar when he had the seizure?  Was he otherwise in good health, etc.  Make sure he continues with his medications, it is not clear whether we need to increase his drug, he needs to be seen in the office before something like this can be determined.  Unfortunately, given my schedule, this may take some time.  "

## 2020-08-31 NOTE — TELEPHONE ENCOUNTER
Phone Number Called: [phone] (home)       Call outcome: Spoke to patient regarding message below.    Message: Patient does not think he had a seizure but he has daily vertigo and would like to be seen for it. Appointment scheduled

## 2020-09-01 ENCOUNTER — OFFICE VISIT (OUTPATIENT)
Dept: MEDICAL GROUP | Facility: PHYSICIAN GROUP | Age: 45
End: 2020-09-01
Payer: COMMERCIAL

## 2020-09-01 VITALS
WEIGHT: 219 LBS | HEART RATE: 90 BPM | DIASTOLIC BLOOD PRESSURE: 80 MMHG | RESPIRATION RATE: 16 BRPM | BODY MASS INDEX: 29.03 KG/M2 | TEMPERATURE: 97.5 F | SYSTOLIC BLOOD PRESSURE: 120 MMHG | HEIGHT: 73 IN | OXYGEN SATURATION: 98 %

## 2020-09-01 DIAGNOSIS — R42 VERTIGO: ICD-10-CM

## 2020-09-01 DIAGNOSIS — G40.209 COMPLEX PARTIAL SEIZURE EVOLVING TO GENERALIZED SEIZURE (HCC): ICD-10-CM

## 2020-09-01 DIAGNOSIS — L98.9 SCALP LESION: ICD-10-CM

## 2020-09-01 DIAGNOSIS — E11.42 DIABETIC POLYNEUROPATHY ASSOCIATED WITH TYPE 2 DIABETES MELLITUS (HCC): ICD-10-CM

## 2020-09-01 PROBLEM — E11.9 DM (DIABETES MELLITUS) (HCC): Status: RESOLVED | Noted: 2018-01-24 | Resolved: 2020-09-01

## 2020-09-01 PROCEDURE — 99214 OFFICE O/P EST MOD 30 MIN: CPT | Performed by: NURSE PRACTITIONER

## 2020-09-01 RX ORDER — NAPROXEN SODIUM 550 MG/1
TABLET ORAL
COMMUNITY
Start: 2020-08-06 | End: 2020-09-23 | Stop reason: SDUPTHER

## 2020-09-01 RX ORDER — PREGABALIN 150 MG/1
CAPSULE ORAL
COMMUNITY
Start: 2020-07-15 | End: 2022-01-03

## 2020-09-01 RX ORDER — BLOOD SUGAR DIAGNOSTIC
STRIP MISCELLANEOUS
COMMUNITY
Start: 2020-07-31 | End: 2022-10-20 | Stop reason: SDUPTHER

## 2020-09-01 ASSESSMENT — FIBROSIS 4 INDEX: FIB4 SCORE: 1.13

## 2020-09-01 NOTE — PROGRESS NOTES
Chief Complaint   Patient presents with   • Vertigo       Subjective:   Ivan Prescott is a 45 y.o. male here today for evaluation and management of:    Complex partial seizure evolving to generalized seizure (HCC)  Reports that on Friday he felt dizzy, paramedics called and blood sugar was high at 313.  Refused to go to the hospital.  Took insulin and it came back at 95.  This morning blood sugar was in the 90's    Vertigo  Reports ongoing vertigo with movement, feels off balance.  Discussed physical therapy    Diabetic polyneuropathy associated with type 2 diabetes mellitus ([facility])  Patient is reporting bs is running in the morning 90's.  Actos, emgality, xiguduo.    Uncontrolled type 2 diabetes mellitus with diabetic neuropathy, without long-term current use of insulin ([facility])  Not currently seeing endocrinology as his doctor left the clinic.  Will reach out.  Last A1c 6.0.  FBS this morning 95.           Current medicines (including changes today)  Current Outpatient Medications   Medication Sig Dispense Refill   • naproxen sodium (ANAPROX) 550 MG tablet TAKE 1 TABLET BY MOUTH DAILY AND 1 AS NEEDED FOR HEADACHE     • pregabalin (LYRICA) 150 MG Cap TAKE 1 CAPSULE BY MOUTH TWICE A DAY. E11/30DAYS     • ROPINIRole (REQUIP) 1 MG Tab TAKE 1 TABLET BY MOUTH EVERY DAY AT BEDTIME AS NEEDED 90 Tab 1   • gabapentin (NEURONTIN) 600 MG tablet TAKE 2 TABLETS BY MOUTH 3 TIMES A  Tab 2   • meclizine (ANTIVERT) 25 MG Tab Take 1 Tab by mouth 3 times a day as needed. 90 Tab 1   • traZODone (DESYREL) 100 MG Tab Take 100 mg by mouth every evening.     • Galcanezumab-gnlm (EMGALITY) 120 MG/ML Solution Auto-injector Inject 1 PEN as instructed Q 4 Weeks. 1 PEN 6   • levETIRAcetam (KEPPRA) 500 MG Tab Take 1 Tab by mouth 2 times a day. 60 Tab 5   • pioglitazone (ACTOS) 30 MG Tab Take 30 mg by mouth every day.     • sumatriptan (IMITREX) 100 MG tablet 1 tab at headache onset; repeat in1 hour prn 9 Tab 6   • XIGDUO XR  MG  "TABLET SR 24 HR TAKE 1 TABLET BY MOUTH EVERY DAY IN THE MORNING 30 Tab 11   • SUMAtriptan Succinate 4 MG/0.5ML Solution Auto-injector Inject 1 Application as instructed Once PRN for up to 1 dose. Inject once at headache onset; repeat in 1 hour prn 0.5 mL 6   • NON SPECIFIED Nurtec OCT 75mg tabs. 1 tab at headache onset; repeat once in 24-48 hours prn 2 Each 0     No current facility-administered medications for this visit.      He  has a past medical history of Allergy, Anxiety, Diabetes (HCC), Head ache, and Hypertension.    ROS as stated in hpi  No chest pain, no shortness of breath, no abdominal pain       Objective:     /80 (BP Location: Left arm, Patient Position: Sitting)   Pulse 90   Temp 36.4 °C (97.5 °F) (Temporal)   Resp 16   Ht 1.854 m (6' 1\")   Wt 99.3 kg (219 lb)   SpO2 98%  Body mass index is 28.89 kg/m².   Physical Exam:  Constitutional: Alert, no distress.  Skin: Warm, dry, good turgor,no cyanosis, no rashes in visible areas.  Eye: Equal, round and reactive, conjunctiva clear, lids normal.  Ears: No tenderness, no discharge.  External canals are without any significant edema or erythema. Gross auditory acuity is intact.  Nose: symmetrical without tenderness, no discharge.  Mouth/Throat: lips without lesion.  Oropharynx clear.    Neck: Trachea midline, no masses, no obvious thyroid enlargement... Range of motion within normal limits.  Neuro: Cranial nerves 2-12 grossly intact.  No sensory deficit.  Respiratory: Unlabored respiratory effort, lungs clear to auscultation, no wheezes, no ronchi.  Cardiovascular: Normal S1, S2, no murmur, no edema.  Abdomen: Soft, non-tender, no masses, no guarding,  no hepatosplenomegaly.  Psych: Alert and oriented x3, normal affect and mood and judgement.        Assessment and Plan:   The following treatment plan was discussed    1. Complex partial seizure evolving to generalized seizure (HCC)  Chronic, ongoing.  Followed by Dr. Fitzgerald.  No recent seizure " reported    2. Vertigo  Chronic, ongoing, worsening.  Refer to physical therapy for vestibular evaluation.   - REFERRAL TO PHYSICAL THERAPY Reason for Therapy: Eval/Treat/Report    3. Scalp lesion  New problem.  Chronic, worsening.  Refer to derm.   - REFERRAL TO DERMATOLOGY    4. Diabetic polyneuropathy associated with type 2 diabetes mellitus (HCC)  Chronic, ongoing. See #5    5. Uncontrolled type 2 diabetes mellitus with diabetic neuropathy, without long-term current use of insulin (HCC)  Chronic, ongoing in good control.  Would like to get back into a endocrinology.  Will reach out to [facility] end[facility] to see if they can re-establish care.       Followup: Return in about 2 months (around 11/1/2020) for Multiple issues.         Educated in proper administration of medication(s) ordered today including safety, possible SE, risks, benefits, rationale and alternatives to therapy.     Please note that this dictation was created using voice recognition software. I have made every reasonable attempt to correct obvious errors, but I expect that there are errors of grammar and possibly content that I did not discover before finalizing the note.

## 2020-09-01 NOTE — ASSESSMENT & PLAN NOTE
Reports that on Friday he felt dizzy, paramedics called and blood sugar was high at 313.  Refused to go to the hospital.  Took insulin and it came back at 95.  This morning blood sugar was in the 90's

## 2020-09-01 NOTE — ASSESSMENT & PLAN NOTE
Not currently seeing endocrinology as his doctor left the clinic.  Will reach out.  Last A1c 6.0.  FBS this morning 95.

## 2020-09-23 ENCOUNTER — OFFICE VISIT (OUTPATIENT)
Dept: NEUROLOGY | Facility: MEDICAL CENTER | Age: 45
End: 2020-09-23

## 2020-09-23 VITALS
WEIGHT: 225.09 LBS | TEMPERATURE: 97.5 F | OXYGEN SATURATION: 98 % | BODY MASS INDEX: 29.83 KG/M2 | HEART RATE: 117 BPM | DIASTOLIC BLOOD PRESSURE: 82 MMHG | HEIGHT: 73 IN | SYSTOLIC BLOOD PRESSURE: 108 MMHG

## 2020-09-23 DIAGNOSIS — R42 ORTHOSTATIC DIZZINESS: ICD-10-CM

## 2020-09-23 DIAGNOSIS — M79.7 FIBROMYALGIA: ICD-10-CM

## 2020-09-23 DIAGNOSIS — R42 VERTIGO: ICD-10-CM

## 2020-09-23 DIAGNOSIS — G40.209 COMPLEX PARTIAL SEIZURE EVOLVING TO GENERALIZED SEIZURE (HCC): Primary | ICD-10-CM

## 2020-09-23 DIAGNOSIS — G25.81 RLS (RESTLESS LEGS SYNDROME): ICD-10-CM

## 2020-09-23 DIAGNOSIS — E11.42 DIABETIC POLYNEUROPATHY ASSOCIATED WITH TYPE 2 DIABETES MELLITUS (HCC): ICD-10-CM

## 2020-09-23 PROCEDURE — 99214 OFFICE O/P EST MOD 30 MIN: CPT | Performed by: PSYCHIATRY & NEUROLOGY

## 2020-09-23 RX ORDER — LEVETIRACETAM 500 MG/1
500 TABLET ORAL 2 TIMES DAILY
Qty: 180 TAB | Refills: 3 | Status: SHIPPED | OUTPATIENT
Start: 2020-09-23 | End: 2021-09-27

## 2020-09-23 RX ORDER — ROPINIROLE 1 MG/1
TABLET, FILM COATED ORAL
Qty: 90 TAB | Refills: 1 | Status: SHIPPED | OUTPATIENT
Start: 2020-09-23 | End: 2022-01-03

## 2020-09-23 RX ORDER — GALCANEZUMAB 120 MG/ML
1 INJECTION, SOLUTION SUBCUTANEOUS
Qty: 1 EACH | Refills: 11 | Status: SHIPPED | OUTPATIENT
Start: 2020-09-23 | End: 2022-01-03

## 2020-09-23 RX ORDER — NAPROXEN SODIUM 550 MG/1
TABLET ORAL
Qty: 60 TAB | Refills: 2 | Status: SHIPPED | OUTPATIENT
Start: 2020-09-23 | End: 2022-01-03

## 2020-09-23 RX ORDER — GABAPENTIN 600 MG/1
1200 TABLET ORAL 2 TIMES DAILY
Qty: 360 TAB | Refills: 3 | Status: SHIPPED | OUTPATIENT
Start: 2020-09-23 | End: 2021-08-26

## 2020-09-23 RX ORDER — SUMATRIPTAN SUCCINATE 4 MG/.5ML
1 INJECTION, SOLUTION SUBCUTANEOUS
Qty: 0.5 ML | Refills: 6 | Status: SHIPPED | OUTPATIENT
Start: 2020-09-23 | End: 2022-01-03

## 2020-09-23 ASSESSMENT — ENCOUNTER SYMPTOMS
DIZZINESS: 1
FALLS: 0
MEMORY LOSS: 0
LOSS OF CONSCIOUSNESS: 0
SENSORY CHANGE: 1
TREMORS: 0
HEADACHES: 1
CONSTIPATION: 1
SEIZURES: 1
FOCAL WEAKNESS: 0

## 2020-09-23 ASSESSMENT — FIBROSIS 4 INDEX: FIB4 SCORE: 1.13

## 2020-09-23 NOTE — PROGRESS NOTES
Subjective:      Ivan Prescott is a 45 y.o. male who presents for follow-up, with a long history of complex partial seizures, migraine, symptomatic RLS, but who has continuing problems with episodic dizziness and vertigo that is to some degree orthostatic in nature, associated with nausea and vomiting.    HPI    Dizziness: The episodes continue, when seen 6 months ago, these are already problematic.  They typically occur if he stands too quickly, the spinning can be quite severe, he feels better only if he sits, remained static, but then has to vomit.  These are not symptoms associated with his headaches.  He also describes the same symptoms beginning if he is standing with his neck extended, resuming neutral posture helps.  Standing more slowly does not necessarily lead into 1 of the more severe episodes but he is dizzy.  Sudden movements can bring things on.  If he is supine or sitting, no movements, even neck extension, brings the symptoms on.     He also describes symptoms of persistent constipation, requiring some disimpaction and then followed by diarrhea.  He has never had any syncopal events.  Event some cells are not associated with diaphoresis, senses of chest pain or palpitations.  He has a long history of ED, this has begun to improve slightly.  He denies any issues with urinary changes including retention.    With the episodes, he is always checked his sugars, these have never been low.  He has not checked his blood pressure or pulse in a supine, sitting or standing position in the past, he does not routinely check these values with an event.  His recovery is always complete.  Imaging of the brain has never been remarkable, though it has been done for other reasons.  He is scheduled to see a vertigo specialist in the next week or so.    Migraine: These are actually well controlled on Emgality, he may use Imitrex on a rare occasion.  Samples of Nurtec ODT 75 mg wafers were given, effective but he  would still prefer Imitrex.  His migraines have never been associated with symptoms of vertigo, etc. as described above.    RLS: Well-controlled though he does have occasional augmentation, Requip 1 mg is taken every evening as needed though this is rare.  Static control is maintained on gabapentin 1200 mg, twice daily.    Neuropathy: He still has the occasional numbness and tingling in the feet, he again gets benefit with the pregabalin as well as the Neurontin.  These have not ascended.    Seizures: He had an unusual event short interval time ago.  This occurred on August 28, 2020.  At home at the time, in bed, he evidently had an event where he was altered, the family had difficulty awakening him, he remembers awakening in the ambulance with EMS personnel around him.  He had none of the post ictal malaise or fatigue, none of the seizure aura symptoms.  He recovered completely.  He was compliant with his Keppra 500 mg, twice daily.  He was not on any other unusual medications.  His sugars were checked at the time of these were found to be high.    Medical, surgical and family histories are reviewed, there are no new drug allergies.  He is on Emgality 120 mg injections every 4 weeks, Anaprox  mg as needed, Imitrex 100 mg as needed as well as a 4 mg injection.  Keppra 500 mg, twice daily, Neurontin 1200 mg, twice daily, Lyrica 150 mg in the morning and at midday, Requip 1 mg every evening as needed, Actos, Xigduo and trazodone complete the bill.    Review of Systems   Gastrointestinal: Positive for constipation.   Genitourinary: Negative for dysuria, frequency and urgency.   Musculoskeletal: Negative for falls.   Neurological: Positive for dizziness, sensory change, seizures and headaches. Negative for tremors, focal weakness and loss of consciousness.   Psychiatric/Behavioral: Negative for memory loss.   All other systems reviewed and are negative.       Objective:     /82 (BP Location: Right arm,  "Patient Position: Sitting, BP Cuff Size: Adult)   Pulse (!) 117   Temp 36.4 °C (97.5 °F) (Temporal)   Ht 1.854 m (6' 1\")   Wt 102.1 kg (225 lb 1.4 oz)   SpO2 98%   BMI 29.70 kg/m²      Physical Exam    As always, he appears in no acute distress, but he is upset given all the difficulties that it is now part of his life.  His vital signs are stable.  I did not check orthostatics though his pulse is 117, rhythm regular.  There is no malar rash.  The neck is supple.  Cardiac evaluation is unremarkable.    He is fully oriented, there is no aphasia or inattention.    PERRLA/EOMI, visual fields are full, facial movements are symmetric, sensory exam is intact to temperature.  The tongue and uvula are midline, there is no bulbar dysfunction.  Shoulder shrug and head rotation are intact.    Musculoskeletal exam reveals normal tone throughout.  There is no tremor or drift.  Strength is intact throughout.  Reflexes are absent at the ankles, diminished at the knees but present, symmetric elsewhere.  The toes are downgoing.    He stands slowly because of his dizziness, but moves along without complaints of actual vertigo.  Station is normal.  There is no appendicular dystaxia with any of the extremities.  Repetitive movements are also intact and symmetric with normal amplitude and frequencies.    Sensory exam is intact to temperature, vibration only diminished below the ankles bilaterally.     Assessment/Plan:     1. Complex partial seizure evolving to generalized seizure (HCC)  For this isolated event that occurred late last month, we need to presume this was a seizure, certainly not TIA or stroke, migraine, and given that it happened when he was laying down, not likely an orthostatic process with global CNS hypoperfusion.  Since it occurred in isolation, I would rather observe, he will continue his Keppra unchanged.  He is quite compliant.  If the events recur, seemingly random but always of similar nature, and " especially if they have the typical clinical picture of his seizures, Keppra can then be increased.  There is no reason to check a level at this time.    - levETIRAcetam (KEPPRA) 500 MG Tab; Take 1 Tab by mouth 2 times a day.  Dispense: 180 Tab; Refill: 3    2. Diabetic polyneuropathy associated with type 2 diabetes mellitus (HCC)  Stable at this time.    3. RLS (restless legs syndrome)  Also stable, he will continue the Requip and gabapentin.  - gabapentin (NEURONTIN) 600 MG tablet; Take 2 Tabs by mouth 2 times a day.  Dispense: 360 Tab; Refill: 3  - ROPINIRole (REQUIP) 1 MG Tab; TAKE 1 TABLET BY MOUTH EVERY DAY AT BEDTIME AS NEEDED  Dispense: 90 Tab; Refill: 1    4. Orthostatic dizziness and vertigo  I am concerned that we might be seeing manifestation of a more diffuse dysautonomia.  Diabetic certainly are greater risk of developing this rather significant complication.  With constipation, but seems to be orthostasis, vertiginous episodes with nausea and vomiting with rapid resolution, I would recommend referral to [facility] into their dysautonomia clinic.  He was told to check his blood pressure and pulse on a regular basis every afternoon in both supine, sitting and standing positions.  Tilt table testing, echocardiography, etc. may be required moving forwards based on these results.  He will follow through with his referral to her vertigo specialist, presumably an ENT.    - REFERRAL TO NEUROLOGY    5. Chronic migraine  Stable, we will continue the Emgality every 4 weeks, the Anaprox and sumatriptan can be used as needed, he is certainly not overusing them.    - Galcanezumab-gnlm (EMGALITY) 120 MG/ML Solution Auto-injector; Inject 1 PEN as instructed every 4 weeks.  Dispense: 1 Each; Refill: 11  - SUMAtriptan Succinate 4 MG/0.5ML Solution Auto-injector; Inject 1 Application as instructed Once PRN for up to 1 dose. Inject once at headache onset; repeat in 1 hour prn  Dispense: 0.5 mL; Refill: 6  -  naproxen sodium (ANAPROX) 550 MG tablet; TAKE 1 TABLET BY MOUTH DAILY AND 1 AS NEEDED FOR HEADACHE  Dispense: 60 Tab; Refill: 2    Time: 25 minutes spent face-to-face for exam, review, discussion, and education, of this over 60% of the time spent counseling and coordinating care.

## 2020-09-23 NOTE — Clinical Note
Andreina, could you do me a favor.  Could you call Leobardo, I had forgotten to ask this of him, but have him check his blood pressure and pulse in both supine, sitting and standing positions.  I told him I am concerned about blood pressure changes in the circumstances.  Have him do this in the afternoons and keep track of the results.

## 2020-09-29 ENCOUNTER — TELEPHONE (OUTPATIENT)
Dept: NEUROLOGY | Facility: MEDICAL CENTER | Age: 45
End: 2020-09-29

## 2020-09-29 ENCOUNTER — APPOINTMENT (RX ONLY)
Dept: URBAN - METROPOLITAN AREA CLINIC 22 | Facility: CLINIC | Age: 45
Setting detail: DERMATOLOGY
End: 2020-09-29

## 2020-09-29 DIAGNOSIS — L72.8 OTHER FOLLICULAR CYSTS OF THE SKIN AND SUBCUTANEOUS TISSUE: ICD-10-CM

## 2020-09-29 DIAGNOSIS — D18.0 HEMANGIOMA: ICD-10-CM

## 2020-09-29 DIAGNOSIS — Z71.89 OTHER SPECIFIED COUNSELING: ICD-10-CM

## 2020-09-29 DIAGNOSIS — D22 MELANOCYTIC NEVI: ICD-10-CM

## 2020-09-29 DIAGNOSIS — L81.4 OTHER MELANIN HYPERPIGMENTATION: ICD-10-CM

## 2020-09-29 DIAGNOSIS — L82.1 OTHER SEBORRHEIC KERATOSIS: ICD-10-CM

## 2020-09-29 PROBLEM — D22.5 MELANOCYTIC NEVI OF TRUNK: Status: ACTIVE | Noted: 2020-09-29

## 2020-09-29 PROBLEM — D18.01 HEMANGIOMA OF SKIN AND SUBCUTANEOUS TISSUE: Status: ACTIVE | Noted: 2020-09-29

## 2020-09-29 PROCEDURE — ? COUNSELING

## 2020-09-29 PROCEDURE — 99203 OFFICE O/P NEW LOW 30 MIN: CPT

## 2020-09-29 PROCEDURE — ? DEFER

## 2020-09-29 ASSESSMENT — LOCATION DETAILED DESCRIPTION DERM
LOCATION DETAILED: LEFT SUPERIOR MEDIAL UPPER BACK
LOCATION DETAILED: EPIGASTRIC SKIN
LOCATION DETAILED: RIGHT SUPERIOR MEDIAL MIDBACK
LOCATION DETAILED: RIGHT SUPERIOR PARIETAL SCALP

## 2020-09-29 ASSESSMENT — LOCATION SIMPLE DESCRIPTION DERM
LOCATION SIMPLE: SCALP
LOCATION SIMPLE: ABDOMEN
LOCATION SIMPLE: LEFT UPPER BACK
LOCATION SIMPLE: RIGHT LOWER BACK

## 2020-09-29 ASSESSMENT — LOCATION ZONE DERM
LOCATION ZONE: SCALP
LOCATION ZONE: TRUNK

## 2020-09-29 NOTE — TELEPHONE ENCOUNTER
Can we put a referral in for patient to go to [facility] please.       Thank you,  Andreina

## 2020-09-29 NOTE — HPI: CYST
How Severe Is Your Cyst?: moderate
Is This A New Presentation, Or A Follow-Up?: Cyst
Additional History: Patient has neurological issues, will be going to [facility].

## 2020-10-05 ENCOUNTER — APPOINTMENT (OUTPATIENT)
Dept: PHYSICAL THERAPY | Facility: REHABILITATION | Age: 45
End: 2020-10-05
Attending: NURSE PRACTITIONER

## 2020-10-06 NOTE — OP THERAPY EVALUATION
Outpatient Physical Therapy  VESTIBULAR EVALUATION    [address]  [address]  [place] [address]  Phone:  [phone]  Fax:  [phone]    Date of Evaluation: 10/07/2020    Patient: Ivan Prescott  YOB: 1975  MRN: [id number]     Referring Provider: NHAN Oshea[address] [place] [address]   Referring Diagnosis: Vertigo [R42]     Time Calculation                     Chief Complaint: No chief complaint on file.    Visit Diagnoses     ICD-10-CM   1. Dizziness and giddiness  R42   2. Imbalance  R26.89       Subjective    Past Medical History:   Diagnosis Date   • Allergy    • Anxiety    • Diabetes (HCC)    • Head ache    • Hypertension      Past Surgical History:   Procedure Laterality Date   • EYE SURGERY     • KNEE ARTHROTOMY      plica removal 2016     Social History     Tobacco Use   • Smoking status: Never Smoker   • Smokeless tobacco: Current User     Types: Chew   Substance Use Topics   • Alcohol use: No     Family and Occupational History     Socioeconomic History   • Marital status:      Spouse name: Not on file   • Number of children: Not on file   • Years of education: Not on file   • Highest education level: Not on file   Occupational History   • Not on file       Objective    Exercises/Treatment  Time-based treatments/modalities:           Assessment & Plan    Functional Assessment Used          Referring provider co-signature:  I have reviewed this plan of care and my co-signature certifies the need for services.    Certification Period: 10/07/2020 to  12/01/20    Physician Signature: ________________________________ Date: ______________         Interpolation Flap Text: A decision was made to reconstruct the defect utilizing an interpolation axial flap.  A telfa template was made of the defect. This telfa template was then used to outline the interpolation flap.  The donor area for the pedicle flap was then injected with anesthesia.  The flap was excised through the skin and subcutaneous tissue down to the layer of the underlying musculature.  The interpolation flap was carefully excised within this deep plane to maintain its blood supply.  The edges of the donor site were undermined.   The donor site was closed in a primary fashion.  The pedicle was then rotated into position and sutured.  Once the tube was sutured into place, adequate blood supply was confirmed with blanching and refill.

## 2020-10-07 ENCOUNTER — APPOINTMENT (OUTPATIENT)
Dept: PHYSICAL THERAPY | Facility: REHABILITATION | Age: 45
End: 2020-10-07
Attending: NURSE PRACTITIONER

## 2020-10-12 ENCOUNTER — APPOINTMENT (OUTPATIENT)
Dept: PHYSICAL THERAPY | Facility: REHABILITATION | Age: 45
End: 2020-10-12

## 2020-10-12 RX ORDER — MECLIZINE HYDROCHLORIDE 25 MG/1
25 TABLET ORAL 3 TIMES DAILY PRN
Qty: 90 TAB | Refills: 0 | Status: SHIPPED | OUTPATIENT
Start: 2020-10-12 | End: 2021-11-02

## 2020-10-12 RX ORDER — SUMATRIPTAN 100 MG/1
TABLET, FILM COATED ORAL
Qty: 9 TAB | Refills: 0 | Status: SHIPPED | OUTPATIENT
Start: 2020-10-12 | End: 2022-01-03

## 2020-10-12 NOTE — TELEPHONE ENCOUNTER
Received request via: Patient    Was the patient seen in the last year in this department? Yes LOV 09/01/2020    Does the patient have an active prescription (recently filled or refills available) for medication(s) requested? No

## 2020-10-14 ENCOUNTER — APPOINTMENT (OUTPATIENT)
Dept: PHYSICAL THERAPY | Facility: REHABILITATION | Age: 45
End: 2020-10-14

## 2020-10-20 ENCOUNTER — TELEPHONE (OUTPATIENT)
Dept: MEDICAL GROUP | Facility: PHYSICIAN GROUP | Age: 45
End: 2020-10-20

## 2020-10-20 DIAGNOSIS — E11.42 DIABETIC POLYNEUROPATHY ASSOCIATED WITH TYPE 2 DIABETES MELLITUS (HCC): ICD-10-CM

## 2020-10-20 NOTE — TELEPHONE ENCOUNTER
VOICEMAIL  1. Caller Name: Ivan                      Call Back Number: [phone] (home)       2. Message: Patient had to change insurance and [facility] does not accept his insurance, please place new URGENT referral for him. He is really having trouble with his legs again. Insurance has been updated in the computer. Thank you     3. Patient approves office to leave a detailed voicemail/MyChart message: N\A

## 2020-10-21 ENCOUNTER — TELEPHONE (OUTPATIENT)
Dept: MEDICAL GROUP | Facility: PHYSICIAN GROUP | Age: 45
End: 2020-10-21

## 2021-01-11 ENCOUNTER — HOSPITAL ENCOUNTER (OUTPATIENT)
Dept: LAB | Facility: MEDICAL CENTER | Age: 46
End: 2021-01-11
Attending: FAMILY MEDICINE
Payer: COMMERCIAL

## 2021-01-11 LAB
COVID ORDER STATUS COVID19: NORMAL
SARS-COV-2 RNA RESP QL NAA+PROBE: NOTDETECTED
SPECIMEN SOURCE: NORMAL

## 2021-01-11 PROCEDURE — U0003 INFECTIOUS AGENT DETECTION BY NUCLEIC ACID (DNA OR RNA); SEVERE ACUTE RESPIRATORY SYNDROME CORONAVIRUS 2 (SARS-COV-2) (CORONAVIRUS DISEASE [COVID-19]), AMPLIFIED PROBE TECHNIQUE, MAKING USE OF HIGH THROUGHPUT TECHNOLOGIES AS DESCRIBED BY CMS-2020-01-R: HCPCS

## 2021-01-11 PROCEDURE — U0005 INFEC AGEN DETEC AMPLI PROBE: HCPCS

## 2021-01-11 PROCEDURE — C9803 HOPD COVID-19 SPEC COLLECT: HCPCS

## 2021-07-17 ENCOUNTER — HOSPITAL ENCOUNTER (EMERGENCY)
Facility: MEDICAL CENTER | Age: 46
End: 2021-07-17
Attending: EMERGENCY MEDICINE
Payer: MEDICAID

## 2021-07-17 VITALS
DIASTOLIC BLOOD PRESSURE: 86 MMHG | RESPIRATION RATE: 19 BRPM | SYSTOLIC BLOOD PRESSURE: 123 MMHG | HEART RATE: 93 BPM | OXYGEN SATURATION: 96 % | TEMPERATURE: 98.8 F | WEIGHT: 225 LBS | HEIGHT: 73 IN | BODY MASS INDEX: 29.82 KG/M2

## 2021-07-17 DIAGNOSIS — F44.9 CONVERSION REACTION: ICD-10-CM

## 2021-07-17 DIAGNOSIS — G40.909 SEIZURE DISORDER (HCC): ICD-10-CM

## 2021-07-17 LAB
ALBUMIN SERPL BCP-MCNC: 3.9 G/DL (ref 3.2–4.9)
ALBUMIN/GLOB SERPL: 1.4 G/DL
ALP SERPL-CCNC: 137 U/L (ref 30–99)
ALT SERPL-CCNC: 38 U/L (ref 2–50)
ANION GAP SERPL CALC-SCNC: 13 MMOL/L (ref 7–16)
AST SERPL-CCNC: 24 U/L (ref 12–45)
BASOPHILS # BLD AUTO: 0.4 % (ref 0–1.8)
BASOPHILS # BLD: 0.04 K/UL (ref 0–0.12)
BILIRUB SERPL-MCNC: 0.5 MG/DL (ref 0.1–1.5)
BUN SERPL-MCNC: 16 MG/DL (ref 8–22)
CALCIUM SERPL-MCNC: 8.8 MG/DL (ref 8.5–10.5)
CHLORIDE SERPL-SCNC: 101 MMOL/L (ref 96–112)
CO2 SERPL-SCNC: 22 MMOL/L (ref 20–33)
CREAT SERPL-MCNC: 0.95 MG/DL (ref 0.5–1.4)
EOSINOPHIL # BLD AUTO: 0.06 K/UL (ref 0–0.51)
EOSINOPHIL NFR BLD: 0.7 % (ref 0–6.9)
ERYTHROCYTE [DISTWIDTH] IN BLOOD BY AUTOMATED COUNT: 36.3 FL (ref 35.9–50)
GLOBULIN SER CALC-MCNC: 2.8 G/DL (ref 1.9–3.5)
GLUCOSE SERPL-MCNC: 486 MG/DL (ref 65–99)
HCT VFR BLD AUTO: 47.5 % (ref 42–52)
HGB BLD-MCNC: 16.5 G/DL (ref 14–18)
IMM GRANULOCYTES # BLD AUTO: 0.04 K/UL (ref 0–0.11)
IMM GRANULOCYTES NFR BLD AUTO: 0.4 % (ref 0–0.9)
LYMPHOCYTES # BLD AUTO: 1.94 K/UL (ref 1–4.8)
LYMPHOCYTES NFR BLD: 21.7 % (ref 22–41)
MCH RBC QN AUTO: 29.4 PG (ref 27–33)
MCHC RBC AUTO-ENTMCNC: 34.7 G/DL (ref 33.7–35.3)
MCV RBC AUTO: 84.7 FL (ref 81.4–97.8)
MONOCYTES # BLD AUTO: 0.63 K/UL (ref 0–0.85)
MONOCYTES NFR BLD AUTO: 7 % (ref 0–13.4)
NEUTROPHILS # BLD AUTO: 6.23 K/UL (ref 1.82–7.42)
NEUTROPHILS NFR BLD: 69.8 % (ref 44–72)
NRBC # BLD AUTO: 0 K/UL
NRBC BLD-RTO: 0 /100 WBC
PLATELET # BLD AUTO: 254 K/UL (ref 164–446)
PMV BLD AUTO: 9.8 FL (ref 9–12.9)
POTASSIUM SERPL-SCNC: 4.1 MMOL/L (ref 3.6–5.5)
PROT SERPL-MCNC: 6.7 G/DL (ref 6–8.2)
RBC # BLD AUTO: 5.61 M/UL (ref 4.7–6.1)
SODIUM SERPL-SCNC: 136 MMOL/L (ref 135–145)
WBC # BLD AUTO: 8.9 K/UL (ref 4.8–10.8)

## 2021-07-17 PROCEDURE — A9270 NON-COVERED ITEM OR SERVICE: HCPCS | Performed by: EMERGENCY MEDICINE

## 2021-07-17 PROCEDURE — 96365 THER/PROPH/DIAG IV INF INIT: CPT

## 2021-07-17 PROCEDURE — 36415 COLL VENOUS BLD VENIPUNCTURE: CPT

## 2021-07-17 PROCEDURE — 700102 HCHG RX REV CODE 250 W/ 637 OVERRIDE(OP): Performed by: EMERGENCY MEDICINE

## 2021-07-17 PROCEDURE — 700105 HCHG RX REV CODE 258: Performed by: EMERGENCY MEDICINE

## 2021-07-17 PROCEDURE — 85025 COMPLETE CBC W/AUTO DIFF WBC: CPT

## 2021-07-17 PROCEDURE — 99284 EMERGENCY DEPT VISIT MOD MDM: CPT

## 2021-07-17 PROCEDURE — 700111 HCHG RX REV CODE 636 W/ 250 OVERRIDE (IP): Performed by: EMERGENCY MEDICINE

## 2021-07-17 PROCEDURE — 80177 DRUG SCRN QUAN LEVETIRACETAM: CPT

## 2021-07-17 PROCEDURE — 80053 COMPREHEN METABOLIC PANEL: CPT

## 2021-07-17 RX ORDER — SODIUM CHLORIDE, SODIUM LACTATE, POTASSIUM CHLORIDE, CALCIUM CHLORIDE 600; 310; 30; 20 MG/100ML; MG/100ML; MG/100ML; MG/100ML
1000 INJECTION, SOLUTION INTRAVENOUS ONCE
Status: COMPLETED | OUTPATIENT
Start: 2021-07-17 | End: 2021-07-17

## 2021-07-17 RX ORDER — LEVETIRACETAM 10 MG/ML
1000 INJECTION INTRAVASCULAR ONCE
Status: COMPLETED | OUTPATIENT
Start: 2021-07-17 | End: 2021-07-17

## 2021-07-17 RX ORDER — IBUPROFEN 600 MG/1
600 TABLET ORAL ONCE
Status: DISCONTINUED | OUTPATIENT
Start: 2021-07-17 | End: 2021-07-17 | Stop reason: HOSPADM

## 2021-07-17 RX ADMIN — LEVETIRACETAM INJECTION 1000 MG: 10 INJECTION INTRAVENOUS at 19:54

## 2021-07-17 RX ADMIN — SODIUM CHLORIDE, POTASSIUM CHLORIDE, SODIUM LACTATE AND CALCIUM CHLORIDE 1000 ML: 600; 310; 30; 20 INJECTION, SOLUTION INTRAVENOUS at 16:30

## 2021-07-17 ASSESSMENT — FIBROSIS 4 INDEX: FIB4 SCORE: 1.15

## 2021-07-17 NOTE — ED TRIAGE NOTES
"Chief Complaint   Patient presents with   • Seizure     BIB [facility] from home for seizure like activity witnessed by wife. Upon arrival to scene EMS experienced a brief moment of tremors to extremities. given versed 5mg PTA.  states compliance to sz meds    • Headache     Patient changed to hospital gown  Placed on cardiac monitor.   V/s stable. Afebrile  Pending MD huerta.   /85   Pulse (!) 107   Temp 36.6 °C (97.8 °F) (Temporal)   Resp (!) 32   Ht 1.854 m (6' 1\")   Wt 102 kg (225 lb)   SpO2 95%   BMI 29.69 kg/m²     "

## 2021-07-17 NOTE — ED PROVIDER NOTES
ED Provider Note    Scribed for Edi Regan M.D. by Blake Suazo. 7/17/2021  4:24 PM    Primary care provider: NHAN Oshea  Means of arrival: EMS  History obtained from: Triage, Nurse and EMS  History limited by: Poor Historian; Nonverbal    CHIEF COMPLAINT  Chief Complaint   Patient presents with   • Seizure     BIB [facility] from home for seizure like activity witnessed by wife. Upon arrival to scene EMS experienced a brief moment of tremors to extremities. given versed 5mg PTA.  states compliance to sz meds    • Headache       HPI  Ivan Prescott is a 46 y.o. male who presents to the Emergency Department via EMS for an acute seizure onset prior to arrival. Per triage, EMS arrived at the patient's home for a seizure like activity witnessed by his wife. EMS notes that the patient experienced brief tremors to extremities upon arrival. Denies any fevers. No alleviating or exacerbating factors reported.    HPI limited by: Poor Historian; Nonverbal    REVIEW OF SYSTEMS  Pertinent negatives include no fevers.  See HPI for further details.     ROS limited by: Poor Historian; Nonverbal    PAST MEDICAL HISTORY   has a past medical history of Allergy, Anxiety, Diabetes (HCC), Head ache, and Hypertension.    SURGICAL HISTORY   has a past surgical history that includes knee arthrotomy and eye surgery.    SOCIAL HISTORY  Social History     Tobacco Use   • Smoking status: Never Smoker   • Smokeless tobacco: Current User     Types: Chew   Vaping Use   • Vaping Use: Never used   Substance Use Topics   • Alcohol use: No   • Drug use: No      Social History     Substance and Sexual Activity   Drug Use No       FAMILY HISTORY  Family History   Problem Relation Age of Onset   • No Known Problems Mother    • No Known Problems Father    • No Known Problems Sister    • Diabetes Paternal Grandmother        CURRENT MEDICATIONS  Home Medications     Reviewed by Mayela Hale R.N. (Registered Nurse) on 07/17/21 at 1610  Med List  "Status: Not Addressed   Medication Last Dose Status   gabapentin (NEURONTIN) 600 MG tablet  Active   Galcanezumab-gnlm (EMGALITY) 120 MG/ML Solution Auto-injector  Active   levETIRAcetam (KEPPRA) 500 MG Tab  Active   meclizine (ANTIVERT) 25 MG Tab  Active   naproxen sodium (ANAPROX) 550 MG tablet  Active   ONETOUCH ULTRA strip  Active   pioglitazone (ACTOS) 30 MG Tab  Active   pregabalin (LYRICA) 150 MG Cap  Active   ROPINIRole (REQUIP) 1 MG Tab  Active   sumatriptan (IMITREX) 100 MG tablet  Active   SUMAtriptan Succinate 4 MG/0.5ML Solution Auto-injector  Active   traZODone (DESYREL) 100 MG Tab  Active   XIGDUO XR  MG TABLET SR 24 HR  Active                ALLERGIES  Allergies   Allergen Reactions   • Acetaminophen Swelling     Face swells up for 4 hours       PHYSICAL EXAM  VITAL SIGNS: /85   Pulse (!) 107   Temp 36.6 °C (97.8 °F) (Temporal)   Resp (!) 32   Ht 1.854 m (6' 1\")   Wt 102 kg (225 lb)   SpO2 95%   BMI 29.69 kg/m²     Constitutional: Well developed, Well nourished, No acute distress, Non-toxic appearance.   HENT: Normocephalic, Atraumatic, Bilateral external ears normal, Oropharynx is clear mucous membranes are moist. No oral exudates or nasal discharge.   Eyes: Closed and unresponsive to painful stimuli with external rub, Has roving movements during stimulis and responds to nurse's question about seeing his daughter to which he responded, \"Kimi?\" Conjunctiva normal, No discharge.   Neck: Normal range of motion, No tenderness, Supple, No stridor. No meningismus.  Lymphatic: No lymphadenopathy noted.   Cardiovascular: Tachycardic rate and rhythm without murmur rub or gallop.  Thorax & Lungs: Clear breath sounds bilaterally without wheezes, rhonchi or rales. There is no chest wall tenderness.   Abdomen: Soft non-tender non-distended. There is no rebound or guarding. No organomegaly is appreciated. Bowel sounds are normal.  Skin: Normal without rash.   Back: No CVA or spinal " "tenderness.   Extremities: Intact distal pulses, No edema, No tenderness, No cyanosis, No clubbing. Capillary refill is less than 2 seconds.  Musculoskeletal: Good range of motion in all major joints. No tenderness to palpation or major deformities noted.   Neurologic: Unresponsive to painful stimuli with external rub, Has roving movements during stimulis and responds to nurse's question about seeing his daughter to which he responded, \"Vivian?\"   Psychiatric: Affect normal, Judgment normal, Mood normal. There is no suicidal ideation or patient reported hallucinations.     DIAGNOSTIC STUDIES / PROCEDURES    LABS  Labs Reviewed   CBC WITH DIFFERENTIAL - Abnormal; Notable for the following components:       Result Value    Lymphocytes 21.70 (*)     All other components within normal limits   COMP METABOLIC PANEL - Abnormal; Notable for the following components:    Glucose 486 (*)     Alkaline Phosphatase 137 (*)     All other components within normal limits   KEPPRA   ESTIMATED GFR     All labs reviewed by me.    COURSE & MEDICAL DECISION MAKING  Nursing notes, VS, PMSFHx reviewed in chart.    4:24 PM - Patient seen and examined at bedside. Ordered for labs to evaluate. Patient was treated with Motrin 600 mg and IV Fluids for his symptoms.    Patient appears to have a conversion reaction which is why he is unresponsive but he became responsive for nursing staff shortly after and then went unresponsive once again for me and then became more responsive later when his wife arrived and he was able to drink fluids and states that he has complaints of headache in the posterior occiput and upper neck that has been going on for a few days.  We spoke about his CAT scan that was performed last year that was normal and then he also had some nonspecific findings on MRI but no definitive MS    6:29 PM - Patient was reevaluated at bedside with family members. I informed his family members that his Glucose elevated to 486 but there " are no signs of DKA.  Laboratory evaluation is otherwise unremarkable.  They were notified that the patient is experiencing a cardioversion disorder possibly due to stress. The patient's wife informed me that they are currently going through a divorce which may be the cause of his stress. Ordered Keppra to evaluate.    7:17 PM - Patient was reevaluated at bedside and is responsive at this time. He is cleared for discharge at this time. Discussed plans for discharge and precautions. Patient understands and verbalizes agreement to the plan of discharge.    HYDRATION: Based on the patient's presentation of Tachycardia the patient was given IV fluids. IV Hydration was used because oral hydration was not adequate alone. Upon recheck following hydration, the patient was improved.    The patient will return for new or worsening symptoms and is stable at the time of discharge.    DISPOSITION:  Patient will be discharged home in stable condition.    FINAL IMPRESSION  1. Conversion reaction    2. Seizure disorder (HCC)          Blake VILLA (Scribe), am scribing for, and in the presence of, Edi Regan M.D..    Electronically signed by: Blake Suazo (Scribe), 7/17/2021    Edi VILLA M.D. personally performed the services described in this documentation, as scribed by Blake Suazo in my presence, and it is both accurate and complete.    The note accurately reflects work and decisions made by me.  Edi Regan M.D.  7/17/2021  7:43 PM

## 2021-07-18 NOTE — DISCHARGE INSTRUCTIONS
Please take your medications as directed.  I think today you suffered a conversion reaction likely secondary to the stress in your life especially given your divorce.

## 2021-07-18 NOTE — ED NOTES
Patient provided discharge instructions. Patient verbalized understanding. Patient leaving ER in stable condition. Patient ambulatory with steady gait. Wristband and IV's removed.

## 2021-07-19 LAB — LEVETIRACETAM SERPL-MCNC: <2 UG/ML (ref 12–46)

## 2021-08-26 ENCOUNTER — OFFICE VISIT (OUTPATIENT)
Dept: MEDICAL GROUP | Facility: PHYSICIAN GROUP | Age: 46
End: 2021-08-26

## 2021-08-26 VITALS
OXYGEN SATURATION: 98 % | RESPIRATION RATE: 16 BRPM | BODY MASS INDEX: 24.65 KG/M2 | TEMPERATURE: 96.8 F | WEIGHT: 186 LBS | HEART RATE: 88 BPM | SYSTOLIC BLOOD PRESSURE: 96 MMHG | DIASTOLIC BLOOD PRESSURE: 70 MMHG | HEIGHT: 73 IN

## 2021-08-26 DIAGNOSIS — G40.209 COMPLEX PARTIAL SEIZURE EVOLVING TO GENERALIZED SEIZURE (HCC): ICD-10-CM

## 2021-08-26 DIAGNOSIS — E11.42 DIABETIC POLYNEUROPATHY ASSOCIATED WITH TYPE 2 DIABETES MELLITUS (HCC): ICD-10-CM

## 2021-08-26 PROBLEM — F48.9 TENSION: Status: RESOLVED | Noted: 2020-05-11 | Resolved: 2021-08-26

## 2021-08-26 LAB
HBA1C MFR BLD: 12.9 % (ref 0–5.6)
INT CON NEG: NEGATIVE
INT CON POS: POSITIVE

## 2021-08-26 PROCEDURE — 83036 HEMOGLOBIN GLYCOSYLATED A1C: CPT | Performed by: NURSE PRACTITIONER

## 2021-08-26 PROCEDURE — 99213 OFFICE O/P EST LOW 20 MIN: CPT | Performed by: NURSE PRACTITIONER

## 2021-08-26 RX ORDER — LISINOPRIL 2.5 MG/1
2.5 TABLET ORAL DAILY
Qty: 90 TABLET | Refills: 1 | Status: SHIPPED | OUTPATIENT
Start: 2021-08-26 | End: 2021-09-21

## 2021-08-26 RX ORDER — PIOGLITAZONEHYDROCHLORIDE 30 MG/1
30 TABLET ORAL DAILY
Qty: 90 TABLET | Refills: 1 | Status: SHIPPED | OUTPATIENT
Start: 2021-08-26 | End: 2021-09-02 | Stop reason: SDUPTHER

## 2021-08-26 RX ORDER — GABAPENTIN 800 MG/1
800 TABLET ORAL 2 TIMES DAILY
Qty: 180 TABLET | Refills: 1 | Status: SHIPPED | OUTPATIENT
Start: 2021-08-26 | End: 2022-02-17 | Stop reason: SDUPTHER

## 2021-08-26 RX ORDER — DAPAGLIFLOZIN AND METFORMIN HYDROCHLORIDE 10; 1000 MG/1; MG/1
1 TABLET, FILM COATED, EXTENDED RELEASE ORAL DAILY
Qty: 90 TABLET | Refills: 1 | Status: SHIPPED | OUTPATIENT
Start: 2021-08-26 | End: 2022-01-03

## 2021-08-26 ASSESSMENT — PATIENT HEALTH QUESTIONNAIRE - PHQ9
CLINICAL INTERPRETATION OF PHQ2 SCORE: 6
5. POOR APPETITE OR OVEREATING: 0 - NOT AT ALL
SUM OF ALL RESPONSES TO PHQ QUESTIONS 1-9: 12

## 2021-08-26 ASSESSMENT — FIBROSIS 4 INDEX: FIB4 SCORE: 0.69

## 2021-08-26 NOTE — ASSESSMENT & PLAN NOTE
Last FBS over 400.  A1c run today at office.  Has not had insurance coverage for his medications.  Currently not taking any diabetic medications fo a year and half.   Reports that his BS at home ar running at 190-210.  Has lost 65 pounds recently.    A1c 12.9 today.  Restart medications.

## 2021-08-26 NOTE — PROGRESS NOTES
Chief Complaint   Patient presents with   • Shoulder Pain   • Medication Management       Subjective:   Ivan Prescott is a 46 y.o. male here today for evaluation and management of:    Uncontrolled type 2 diabetes mellitus with diabetic neuropathy, without long-term current use of insulin (HCC)  Last FBS over 400.  A1c run today at office.  Has not had insurance coverage for his medications.  Currently not taking any diabetic medications fo a year and half.   Reports that his BS at home ar running at 190-210.  Has lost 65 pounds recently.      Complex partial seizure evolving to generalized seizure (HCC)  Recently seen in ER July 17th, taking Keppra daily.  No reported evenets since then.     Diabetic polyneuropathy associated with type 2 diabetes mellitus (HCC)  Taking 600 mg bid with some improvement.  Would like to increase dosage today.           Current medicines (including changes today)  Current Outpatient Medications   Medication Sig Dispense Refill   • gabapentin (NEURONTIN) 800 MG tablet Take 1 Tablet by mouth 2 times a day. 180 Tablet 1   • Dapagliflozin-metFORMIN HCl ER (XIGDUO XR)  MG TABLET SR 24 HR Take 1 Tablet by mouth every day. 90 Tablet 1   • pioglitazone (ACTOS) 30 MG Tab Take 1 Tablet by mouth every day. 90 Tablet 1   • lisinopril (PRINIVIL) 2.5 MG Tab Take 1 Tablet by mouth every day. 90 Tablet 1   • sumatriptan (IMITREX) 100 MG tablet 1 tab at headache onset; repeat in1 hour prn 9 Tab 0   • meclizine (ANTIVERT) 25 MG Tab Take 1 Tab by mouth 3 times a day as needed. 90 Tab 0   • SUMAtriptan Succinate 4 MG/0.5ML Solution Auto-injector Inject 1 Application as instructed Once PRN for up to 1 dose. Inject once at headache onset; repeat in 1 hour prn 0.5 mL 6   • levETIRAcetam (KEPPRA) 500 MG Tab Take 1 Tab by mouth 2 times a day. 180 Tab 3   • traZODone (DESYREL) 100 MG Tab TAKE 1 TABLET BY MOUTH AT BEDTIME AS NEEDED FOR SLEEP (Patient not taking: Reported on 8/26/2021) 90 Tab 0   •  "Galcanezumab-gnlm (EMGALITY) 120 MG/ML Solution Auto-injector Inject 1 PEN as instructed every 4 weeks. (Patient not taking: Reported on 8/26/2021) 1 Each 11   • ROPINIRole (REQUIP) 1 MG Tab TAKE 1 TABLET BY MOUTH EVERY DAY AT BEDTIME AS NEEDED (Patient not taking: Reported on 8/26/2021) 90 Tab 1   • naproxen sodium (ANAPROX) 550 MG tablet TAKE 1 TABLET BY MOUTH DAILY AND 1 AS NEEDED FOR HEADACHE (Patient not taking: Reported on 8/26/2021) 60 Tab 2   • pregabalin (LYRICA) 150 MG Cap TAKE 1 CAPSULE BY MOUTH TWICE A DAY. E11/30DAYS (Patient not taking: Reported on 8/26/2021)     • ONETOUCH ULTRA strip CHECK A BLOOD SUGAR AT LEAST ONCE A DAY E 11.65     • pioglitazone (ACTOS) 30 MG Tab Take 30 mg by mouth every day. (Patient not taking: Reported on 8/26/2021)     • XIGDUO XR  MG TABLET SR 24 HR TAKE 1 TABLET BY MOUTH EVERY DAY IN THE MORNING (Patient not taking: Reported on 8/26/2021) 30 Tab 11     No current facility-administered medications for this visit.     He  has a past medical history of Allergy, Anxiety, Diabetes (HCC), Head ache, and Hypertension.    ROS as stated in hpi  No chest pain, no shortness of breath, no abdominal pain       Objective:     BP (!) 96/70 (BP Location: Left arm, Patient Position: Sitting)   Pulse 88   Temp 36 °C (96.8 °F) (Temporal)   Resp 16   Ht 1.854 m (6' 1\")   Wt 84.4 kg (186 lb)   SpO2 98%  Body mass index is 24.54 kg/m².   Physical Exam:  Constitutional: Alert, no distress.  Skin: Warm, dry, good turgor,no cyanosis, no rashes in visible areas.  Eye: Equal, round and reactive, conjunctiva clear, lids normal.  Neuro: Cranial nerves 2-12 grossly intact.  No sensory deficit.  Respiratory: Unlabored respiratory effort, Psych: Alert and oriented x3, normal affect and mood and judgement.        Assessment and Plan:   The following treatment plan was discussed    1. Uncontrolled type 2 diabetes mellitus with diabetic neuropathy, without long-term current use of insulin " (HCC)  Chronic, ongoing, out of control  A1c 12.9.  Has not been on any meds for 18 months.  Has not been seen by provider in that long.  Restart xigduo, actos and add low dose lisinopril for kidney protection.  Recheck in 3 months.  Monitor and follow.   - POCT  A1C    2. Complex partial seizure evolving to generalized seizure (HCC)  Chronic, ongoing, followed by Dr. frazier  Recent possible seizure in July.  Continues with Keppra.  No seizure activity since then.  Mounting family stress with upcoming move and divorce.  Monitor.     3. Diabetic polyneuropathy associated with type 2 diabetes mellitus (HCC)  Chronic, ongoing, worsening, most likely due to uncontrolled diabetes.  Increase gabapentin to 800 mg bid.  Return in 3 months.       Followup: Return in about 3 months (around 11/26/2021) for Diabetes.         Educated in proper administration of medication(s) ordered today including safety, possible SE, risks, benefits, rationale and alternatives to therapy.     Please note that this dictation was created using voice recognition software. I have made every reasonable attempt to correct obvious errors, but I expect that there are errors of grammar and possibly content that I did not discover before finalizing the note.

## 2021-09-01 ENCOUNTER — PATIENT MESSAGE (OUTPATIENT)
Dept: MEDICAL GROUP | Facility: PHYSICIAN GROUP | Age: 46
End: 2021-09-01

## 2021-09-02 RX ORDER — PIOGLITAZONEHYDROCHLORIDE 30 MG/1
30 TABLET ORAL DAILY
Qty: 90 TABLET | Refills: 1 | Status: SHIPPED
Start: 2021-09-02 | End: 2022-01-03

## 2021-09-02 NOTE — PATIENT COMMUNICATION
Phone Number Called: [phone] (home)       Call outcome: Spoke to patient regarding message below.    Message: Spoke with patient and will do good RX coupon

## 2021-09-02 NOTE — TELEPHONE ENCOUNTER
Received request via: Patient    Was the patient seen in the last year in this department? Yes    Does the patient have an active prescription (recently filled or refills available) for medication(s) requested? INSURANCE IS NOT COVERING MEDICATION, WILL GIVE GOOD RX COUPON AND CARD

## 2021-09-02 NOTE — TELEPHONE ENCOUNTER
Requested Prescriptions     Signed Prescriptions Disp Refills   • pioglitazone (ACTOS) 30 MG Tab 90 Tablet 1     Sig: Take 1 Tablet by mouth every day.     Authorizing Provider: EFRAIN MILLER A.P.R.N.

## 2021-09-21 RX ORDER — LISINOPRIL 2.5 MG/1
2.5 TABLET ORAL DAILY
Qty: 90 TABLET | Refills: 0 | Status: SHIPPED | OUTPATIENT
Start: 2021-09-21 | End: 2022-01-03

## 2021-09-21 NOTE — TELEPHONE ENCOUNTER
Received request via: Pharmacy    Was the patient seen in the last year in this department? Yes    Does the patient have an active prescription (recently filled or refills available) for medication(s) requested? No  
patient

## 2021-09-25 DIAGNOSIS — G40.209 COMPLEX PARTIAL SEIZURE EVOLVING TO GENERALIZED SEIZURE (HCC): ICD-10-CM

## 2021-09-27 ENCOUNTER — PATIENT MESSAGE (OUTPATIENT)
Dept: MEDICAL GROUP | Facility: PHYSICIAN GROUP | Age: 46
End: 2021-09-27

## 2021-09-27 RX ORDER — LEVETIRACETAM 500 MG/1
TABLET ORAL
Qty: 180 TABLET | Refills: 3 | Status: SHIPPED | OUTPATIENT
Start: 2021-09-27 | End: 2022-09-19

## 2021-09-27 NOTE — TELEPHONE ENCOUNTER
Received request via: Pharmacy    Was the patient seen in the last year in this department? No 9/23/20 I LVM to schedule him a follow up.     Does the patient have an active prescription (recently filled or refills available) for medication(s) requested? No

## 2021-10-11 ENCOUNTER — PATIENT MESSAGE (OUTPATIENT)
Dept: MEDICAL GROUP | Facility: PHYSICIAN GROUP | Age: 46
End: 2021-10-11

## 2021-11-02 ENCOUNTER — OFFICE VISIT (OUTPATIENT)
Dept: MEDICAL GROUP | Facility: CLINIC | Age: 46
End: 2021-11-02
Payer: MEDICAID

## 2021-11-02 ENCOUNTER — PATIENT MESSAGE (OUTPATIENT)
Dept: MEDICAL GROUP | Facility: PHYSICIAN GROUP | Age: 46
End: 2021-11-02

## 2021-11-02 VITALS
HEART RATE: 117 BPM | WEIGHT: 182 LBS | RESPIRATION RATE: 18 BRPM | OXYGEN SATURATION: 96 % | HEIGHT: 73 IN | BODY MASS INDEX: 24.12 KG/M2

## 2021-11-02 DIAGNOSIS — M79.671 RIGHT FOOT PAIN: ICD-10-CM

## 2021-11-02 DIAGNOSIS — G40.209 COMPLEX PARTIAL SEIZURE EVOLVING TO GENERALIZED SEIZURE (HCC): ICD-10-CM

## 2021-11-02 DIAGNOSIS — G90.1 DYSAUTONOMIA (HCC): ICD-10-CM

## 2021-11-02 PROCEDURE — 99214 OFFICE O/P EST MOD 30 MIN: CPT | Performed by: FAMILY MEDICINE

## 2021-11-02 RX ORDER — ONDANSETRON 4 MG/1
4 TABLET, FILM COATED ORAL EVERY 4 HOURS PRN
Qty: 30 TABLET | Refills: 3 | Status: SHIPPED | OUTPATIENT
Start: 2021-11-02 | End: 2021-12-02

## 2021-11-02 ASSESSMENT — FIBROSIS 4 INDEX: FIB4 SCORE: 0.69

## 2021-11-02 NOTE — ASSESSMENT & PLAN NOTE
There is some ecchymosis, and tenderness to lateral compression I will go ahead and order an x-ray and we will follow up from there.  Pain management supportive

## 2021-11-02 NOTE — PROGRESS NOTES
Subjective:     Subjective:     CC: Get acquainted, emesis, DM, stress    HPI:   Ivan presents today to discuss the following issues     Problem   Dysautonomia (Ankit)    Has had symptoms for about 3 years diagnosis made     in March.  This required a trip to [place] he had a full evaluation done there.  Symptoms have gotten worse recently possibly exacerbated by stress and other things going on in his life.  Has difficulty keeping anything down.  Is not using any medicines currently.  Has lost 40  pounds by his estimation in the last year and this is accelerated in the last few months.  He is under a lot more stress, partly related to a divorce and he does believe this is making his symptoms worse.     Right Foot Pain    Dropped a home entertainment system on the dorsum of his right foot 3 days ago continues to have pain particularly with ambulation     Uncontrolled Type 2 Diabetes Mellitus With Diabetic Neuropathy, Without Long-Term Current Use of Insulin (Ankit)    There has been great variability in the control of his diabetes.  Did not tolerate Metformin by itself, but did well with the combination drug XIGDUO XR   Medicine has been unaffordable and he is working on that.  Expects to be fully insured within a month and believes he will be able to get the medicine at that point.  He is also working on his diet he notes that he has had A1c's as high as 12 and 13 and is low at 6.         Current Outpatient Medications Ordered in Epic   Medication Sig Dispense Refill   • ondansetron (ZOFRAN) 4 MG Tab tablet Take 1 Tablet by mouth every four hours as needed for Nausea/Vomiting for up to 30 days. 30 Tablet 3   • levETIRAcetam (KEPPRA) 500 MG Tab TAKE 1 TABLET BY MOUTH TWICE A  Tablet 3   • lisinopril (PRINIVIL) 2.5 MG Tab TAKE 1 TABLET BY MOUTH EVERY DAY 90 Tablet 0   • pioglitazone (ACTOS) 30 MG Tab Take 1 Tablet by mouth every day. 90 Tablet 1   • gabapentin (NEURONTIN) 800 MG tablet Take 1 Tablet by mouth  2 times a day. 180 Tablet 1   • Dapagliflozin-metFORMIN HCl ER (XIGDUO XR)  MG TABLET SR 24 HR Take 1 Tablet by mouth every day. 90 Tablet 1   • traZODone (DESYREL) 100 MG Tab TAKE 1 TABLET BY MOUTH AT BEDTIME AS NEEDED FOR SLEEP (Patient not taking: Reported on 8/26/2021) 90 Tab 0   • sumatriptan (IMITREX) 100 MG tablet 1 tab at headache onset; repeat in1 hour prn 9 Tab 0   • Galcanezumab-gnlm (EMGALITY) 120 MG/ML Solution Auto-injector Inject 1 PEN as instructed every 4 weeks. (Patient not taking: Reported on 8/26/2021) 1 Each 11   • SUMAtriptan Succinate 4 MG/0.5ML Solution Auto-injector Inject 1 Application as instructed Once PRN for up to 1 dose. Inject once at headache onset; repeat in 1 hour prn 0.5 mL 6   • ROPINIRole (REQUIP) 1 MG Tab TAKE 1 TABLET BY MOUTH EVERY DAY AT BEDTIME AS NEEDED (Patient not taking: Reported on 8/26/2021) 90 Tab 1   • naproxen sodium (ANAPROX) 550 MG tablet TAKE 1 TABLET BY MOUTH DAILY AND 1 AS NEEDED FOR HEADACHE (Patient not taking: Reported on 8/26/2021) 60 Tab 2   • pregabalin (LYRICA) 150 MG Cap TAKE 1 CAPSULE BY MOUTH TWICE A DAY. E11/30DAYS (Patient not taking: Reported on 8/26/2021)     • ONETOUCH ULTRA strip CHECK A BLOOD SUGAR AT LEAST ONCE A DAY E 11.65     • pioglitazone (ACTOS) 30 MG Tab Take 30 mg by mouth every day. (Patient not taking: Reported on 8/26/2021)     • XIGDUO XR  MG TABLET SR 24 HR TAKE 1 TABLET BY MOUTH EVERY DAY IN THE MORNING (Patient not taking: Reported on 8/26/2021) 30 Tab 11     No current [facility]-ordered facility-administered medications on file.       Health Maintenance:     ROS:  Gen: no fevers/chills, no changes in weight  Eyes: no changes in vision  ENT: no sore throat, no hearing loss, no bloody nose  Pulm: no sob, no cough  CV: no chest pain, no palpitations  GI: no nausea/vomiting, no diarrhea  : no dysuria  MSk: no myalgias  Skin: no rash  Neuro: no headaches, no numbness/tingling  Heme/Lymph: no easy bruising      Objective:  "    Exam:  Pulse (!) 117   Resp 18   Ht 1.854 m (6' 1\")   Wt 82.6 kg (182 lb)   SpO2 96%   BMI 24.01 kg/m²  Body mass index is 24.01 kg/m².    Gen: Alert and oriented, No apparent distress.  Neck: Neck is supple without lymphadenopathy.  Lungs: Normal effort, CTA bilaterally, no wheezes, rhonchi, or rales  CV: Regular rate and rhythm. No murmurs, rubs, or gallops.  Ext: No clubbing, cyanosis, edema.  R foot:some ecchymosis dorsum. Pain to lateral compression          Assessment & Plan:     46 y.o. male with the following -     Problem List Items Addressed This Visit     Uncontrolled type 2 diabetes mellitus with diabetic neuropathy, without long-term current use of insulin (HCC)     Lab Results   Component Value Date/Time    HBA1C 12.9 (A) 08/26/2021 07:15 AM   Will recheck an A1c on follow-up.  I would like him to see me in no more than 6 weeks to discuss medication diet activity and any needs for further diabetic education.         Dysautonomia (HCC)     I am going to do a trial of ondansetron 4 mg.  I do did do an interaction check with some of his medicines.  In particular it does not interact with Keppra.  We will also refer to gastroenterology.  He had a full work-up but has not been scoped.  Would like to know what the early satiety symptoms are all about.         Relevant Orders    Referral to Gastroenterology    Right foot pain     There is some ecchymosis, and tenderness to lateral compression I will go ahead and order an x-ray and we will follow up from there.  Pain management supportive         Relevant Orders    DX-FOOT-2- RIGHT          I spent a total of 45 minutes with record review, exam, communication with the patient, communication with other providers, and documentation of this encounter.      No follow-ups on file.     Addendum: this note erroneously assigned to RAMSES Carpenter. It should be assigned to Ren Ramos MD. Revised 12/1721    I attest that I saw this patient on this date and " due to technical issues am not showing as the author of the note.

## 2021-11-02 NOTE — ASSESSMENT & PLAN NOTE
I am going to do a trial of ondansetron 4 mg.  I do did do an interaction check with some of his medicines.  In particular it does not interact with Keppra.  We will also refer to gastroenterology.  He had a full work-up but has not been scoped.  Would like to know what the early satiety symptoms are all about.

## 2021-11-03 NOTE — ASSESSMENT & PLAN NOTE
Lab Results   Component Value Date/Time    HBA1C 12.9 (A) 08/26/2021 07:15 AM   Will recheck an A1c on follow-up.  I would like him to see me in no more than 6 weeks to discuss medication diet activity and any needs for further diabetic education.

## 2022-01-03 ENCOUNTER — OFFICE VISIT (OUTPATIENT)
Dept: NEUROLOGY | Facility: MEDICAL CENTER | Age: 47
End: 2022-01-03
Attending: PSYCHIATRY & NEUROLOGY
Payer: MEDICAID

## 2022-01-03 VITALS
RESPIRATION RATE: 12 BRPM | WEIGHT: 189.82 LBS | TEMPERATURE: 98 F | HEART RATE: 99 BPM | HEIGHT: 73 IN | SYSTOLIC BLOOD PRESSURE: 124 MMHG | BODY MASS INDEX: 25.16 KG/M2 | OXYGEN SATURATION: 97 % | DIASTOLIC BLOOD PRESSURE: 90 MMHG

## 2022-01-03 DIAGNOSIS — G25.81 RLS (RESTLESS LEGS SYNDROME): ICD-10-CM

## 2022-01-03 DIAGNOSIS — G43.009 MIGRAINE WITHOUT AURA AND WITHOUT STATUS MIGRAINOSUS, NOT INTRACTABLE: ICD-10-CM

## 2022-01-03 PROCEDURE — 99214 OFFICE O/P EST MOD 30 MIN: CPT | Performed by: PSYCHIATRY & NEUROLOGY

## 2022-01-03 PROCEDURE — 99212 OFFICE O/P EST SF 10 MIN: CPT | Performed by: PSYCHIATRY & NEUROLOGY

## 2022-01-03 RX ORDER — RIMEGEPANT SULFATE 75 MG/75MG
1 TABLET, ORALLY DISINTEGRATING ORAL PRN
Qty: 16 TABLET | Refills: 5 | Status: SHIPPED | OUTPATIENT
Start: 2022-01-03 | End: 2022-10-20 | Stop reason: SDUPTHER

## 2022-01-03 ASSESSMENT — PATIENT HEALTH QUESTIONNAIRE - PHQ9: CLINICAL INTERPRETATION OF PHQ2 SCORE: 0

## 2022-01-03 ASSESSMENT — FIBROSIS 4 INDEX: FIB4 SCORE: 0.69

## 2022-01-03 ASSESSMENT — ENCOUNTER SYMPTOMS
LOSS OF CONSCIOUSNESS: 0
VOMITING: 1
FALLS: 0
SEIZURES: 0
TREMORS: 0
DIZZINESS: 1
NAUSEA: 1
HEADACHES: 1
CONSTIPATION: 1

## 2022-01-04 NOTE — PROGRESS NOTES
"Subjective     Ivan Prescott is a 46 y.o. male who presents for follow-up, with a history of migraine without aura, RLS, diabetic polyneuropathy, and persistent dizziness and vertigo with signs of possible dysautonomia.    HPI    Migraine: Originally on Emgality 120 mg injections every 4 weeks, when seen 1 year ago, he was actually doing very well.  He states within the last year, he has had maybe 1 headache.  As a result, he stopped taking the Emgality as a regular issue, he uses it only as needed.  Imitrex 100 mg tablets and Anaprox  mg tablets both stopped working effectively.  Fortunately he has not needed rescue!  We had provided samples of Nurtec which she remembers is working more effectively.    RLS: He is still on gabapentin though his dosing was increased to 800 mg, twice daily.  He calls this drug is \"lifesaver\".  It allows him to get to sleep throughout the night.  He had been given ropinirole 1 mg to use as needed into the evening, he no longer feels that this is effective, having lost the initial benefit.    Seizures: Stable, his last seizure occurred on August 28, 2020.  Since then, still compliant with Keppra 500 mg, twice daily, he has had no recurrences.    Dizziness and vertigo: This is the most distracting symptom for him.  Originally there seem to be more of a consistent orthostatic flavor, symptoms always worsen when he would stand quickly, there was always some improvement if he would sit down.  Still, even at his best, he always feels as if he is walking on a boat.  He has not been falling with regularity.  He denies tremor or loss of dexterity and coordination with the upper extremities.  There is no ringing in the ears, he still has the unilateral hearing loss AD.  Still, he has the severe constipation, he has continued to lose weight despite having quite an appetite.  He denies any early satiety and nausea.  He does have nausea and vomiting as part of his vertigo!  This is " "something that does not respond to the usual antiemetics.    I had wanted to forward him to [facility] for evaluation of possible dysautonomia.  Unfortunately his insurance was not accepted.    In the meantime, glycemic control has improved remarkably, he is no longer on any of his hypoglycemic agents.  The neuropathy symptoms in his feet have improved, he is no longer on the Lyrica as a result.    Medical, surgical and family histories are reviewed, there are no new drug allergies.  He is on Keppra 500 mg, twice daily, Neurontin 800 mg, twice daily, Emgality 120 mg injected every 4 weeks as needed, Imitrex 100 mg as needed.    Review of Systems   Gastrointestinal: Positive for constipation, nausea and vomiting.   Musculoskeletal: Negative for falls.   Neurological: Positive for dizziness and headaches. Negative for tremors, seizures and loss of consciousness.   All other systems reviewed and are negative.    Objective     /90 (BP Location: Right arm, Patient Position: Sitting, BP Cuff Size: Adult)   Pulse 99   Temp 36.7 °C (98 °F) (Temporal)   Resp 12   Ht 1.854 m (6' 1\")   Wt 86.1 kg (189 lb 13.1 oz)   SpO2 97%   BMI 25.04 kg/m²      Physical Exam    He appears in no acute distress.  He is wearing sunglasses because of photophobia.  Vital signs are stable, blood pressure slightly elevated, at 124/90.  Pulse is regular.  There is no malar rash, there is bilateral temporal and jaw tenderness without jaw claudication.  The occipital ridge is tender bilaterally.  Cervical spine still shows a full range of motion, there is no spasm.  There are no trigger points or tender points identified.  Cardiac evaluation is unremarkable.      Neurological Exam    Fully oriented, there is no aphasia or inattention.  PERRLA/EOMI, visual fields are full, facial movements are symmetric, there is no auditory perception of the right ear either via air or bone-conduction.  There is no bulbar dysfunction.  The tongue and uvula " are midline.  Sensory exam is intact to temperature and light touch bilaterally.  Shoulder shrug and head rotation are intact.    Musculoskeletal exam reveals normal tone without tremor or drift.  Strength is 5/5 throughout.  Reflexes are diminished throughout, the left ankle jerk is absent, the is elicited. There are no pathologic reflexes.    He stands easily, armswing is symmetric, gait is normal and station.  He does not look at the ground as he walks.  There is no appendicular dystaxia.  Repetitive movements seem to be slowed in the feet, this is out of proportion to the strength assessment, itself normal.    Sensory exam is intact to temperature, vibration is diminished slightly below the ankles bilaterally.    Assessment & Plan     1. Migraine without aura and without status migrainosus, not intractable  Stable, there is no reason to continue Emgality since he is using it only on an as-needed basis, the drug effective only when taken regularly on a maintenance regimen.  Samples of Nurtec 75 mg ODT wafers are provided, to be used as rescue substituting for Imitrex and Anaprox.    - Rimegepant Sulfate (NURTEC) 75 MG TABLET DISPERSIBLE; Take 1 Tablet by mouth as needed. Take at headache onset. Repeat once in 24 hours  Dispense: 16 Tablet; Refill: 5    2. RLS (restless legs syndrome)  Gabapentin will be continued unchanged.  There is no need for ropinirole to be reintroduced.    3.  Dizziness  Unclear etiology, a CNS cause has been for the most part ruled out.  I still would like to have the appropriate specialist look for possible autonomic process that might be contributing, the unfortunate problem is that his insurance will not cover this.  It is not migrainous, related to CNS ischemia, it is not part of his seizure disorder, it is certainly not medication related given that he is essentially off everything.  From my standpoint, other than handholding, there is little that can be done short of getting him into  the appropriate dysautonomia clinic.    He and I will follow-up in 6 months.    Time: 20 minutes in total spent on patient care including precharting, record review, discussions with healthcare staff and documentation.  This includes face-to-face time for exam, review, discussion, as well as education, counseling and coordinating care.

## 2022-02-21 RX ORDER — GABAPENTIN 800 MG/1
800 TABLET ORAL 2 TIMES DAILY
Qty: 180 TABLET | Refills: 1 | Status: SHIPPED | OUTPATIENT
Start: 2022-02-21 | End: 2022-05-22

## 2022-06-10 NOTE — TELEPHONE ENCOUNTER
Requested Prescriptions     Signed Prescriptions Disp Refills   • gabapentin (NEURONTIN) 600 MG tablet 90 Tab 2     Sig: Take 2 Tabs by mouth 3 times a day.     Authorizing Provider: EFRAIN MILLER A.P.R.N.    
Was the patient seen in the last year in this department? Yes     Does patient have an active prescription for medications requested? No     Received Request Via: Pharmacy        
no

## 2022-07-20 NOTE — TELEPHONE ENCOUNTER
Was the patient seen in the last year in this department? Yes 2/20/19    Does patient have an active prescription for medications requested? No     Received Request Via: pharmacy   Ketoconazole Pregnancy And Lactation Text: This medication is Pregnancy Category C and it isn't know if it is safe during pregnancy. It is also excreted in breast milk and breast feeding isn't recommended.

## 2022-08-09 RX ORDER — GABAPENTIN 800 MG/1
TABLET ORAL
Qty: 180 TABLET | Refills: 0 | Status: SHIPPED | OUTPATIENT
Start: 2022-08-09 | End: 2022-10-20 | Stop reason: SDUPTHER

## 2022-09-23 DIAGNOSIS — G40.209 COMPLEX PARTIAL SEIZURE EVOLVING TO GENERALIZED SEIZURE (HCC): ICD-10-CM

## 2022-09-27 RX ORDER — LEVETIRACETAM 500 MG/1
500 TABLET ORAL 2 TIMES DAILY
Qty: 180 TABLET | Refills: 3 | Status: SHIPPED | OUTPATIENT
Start: 2022-09-27 | End: 2022-12-26

## 2022-11-21 ENCOUNTER — PATIENT MESSAGE (OUTPATIENT)
Dept: MEDICAL GROUP | Facility: CLINIC | Age: 47
End: 2022-11-21
Payer: MEDICAID

## 2022-11-22 RX ORDER — GABAPENTIN 800 MG/1
800 TABLET ORAL 2 TIMES DAILY
Qty: 180 TABLET | Refills: 0 | Status: SHIPPED | OUTPATIENT
Start: 2022-11-22

## 2023-03-28 NOTE — TELEPHONE ENCOUNTER
PT INFORMED   Physical Therapy    Patient not seen in therapy.     Attempted PT session. RN present currently providing medication and switching IV setup. Reports will need several minutes. Will follow-up as schedule allows.       OBJECTIVE                      Documented in the chart in the following areas: Assessment.      Therapy procedure time and total treatment time can be found documented on the Time Entry flowsheet

## 2023-05-26 ENCOUNTER — TELEPHONE (OUTPATIENT)
Dept: NEUROLOGY | Facility: MEDICAL CENTER | Age: 48
End: 2023-05-26
Payer: MEDICAID

## 2023-05-26 NOTE — TELEPHONE ENCOUNTER
VOICEMAIL  1. Caller Name: Ivan                        Call Back Number: [phone]    2. Message: Ivan called because he moved to [place] and needed some of his records sent to his new Neurologist.     3. Patient approves office to leave a detailed voicemail/Owlienthart message: N\A    I CALLED AND LEFT A MESSAGE ON THE PATIENTS VOICEMAIL AND REFERRED HIM TO THE MEDICAL RECORDS OFFICE AND GAVE HIM THE NUMBER TO CALL AND EXPLAINED THAT IT WAS POLICY TO HAVE HIM GO THROUGH MEDICAL RECORDS FOR ANY RECORDS HE NEEDED FORWARDED.

## 2023-06-06 ENCOUNTER — TELEPHONE (OUTPATIENT)
Dept: NEUROLOGY | Facility: MEDICAL CENTER | Age: 48
End: 2023-06-06
Payer: MEDICAID

## 2023-06-06 NOTE — TELEPHONE ENCOUNTER
VOICEMAIL  1. Caller Name: Ivan                      Call Back Number: [phone]    2. Message: Ivan called because he needed his medical records sent to him as he [place] out of state and had a new neurologist and needed his records sent to his new Dr. Is requesting a call back from the MA.     3. Patient approves office to leave a detailed voicemail/MyChart message: N\A    I CALLED PATIENT BACK AND LET HIM KNOW THAT HE WOULD NEED TO GO THROUGH THE MEDICAL RECORDS DEPARTMENT AND GAVE HIM THE NUMBER TO THAT DEPARTMENT.

## 2025-07-16 NOTE — PROGRESS NOTES
"Subjective:      Ivan Prescott is a 43 y.o. male who presents with Sinus Problem (sinus pressure underneath eyes )            Sinus Problem   This is a new problem. The current episode started today. The problem has been gradually worsening since onset. There has been no fever. His pain is at a severity of 4/10. The pain is moderate. Associated symptoms include congestion and sinus pressure. Pertinent negatives include no chills, coughing, ear pain, neck pain, shortness of breath, sneezing or sore throat. Past treatments include nothing.     Patient reports he recently had botox injections performed around the entire scalp 1.5 weeks ago for treatment of migraines. No fevers, chills, body aches, chest pain, cough, or SOB.     Review of Systems   Constitutional: Negative for chills and fever.   HENT: Positive for congestion, sinus pain and sinus pressure. Negative for ear pain, sneezing and sore throat.    Respiratory: Negative for cough, sputum production and shortness of breath.    Cardiovascular: Negative for chest pain.   Gastrointestinal: Negative for abdominal pain, diarrhea, nausea and vomiting.   Genitourinary: Negative.    Musculoskeletal: Negative.  Negative for neck pain.   Skin: Negative for rash.   Neurological: Negative for dizziness.        Objective:     /80   Pulse (!) 111   Temp 36.7 °C (98.1 °F) (Temporal)   Resp 14   Ht 1.854 m (6' 1\")   Wt 92.1 kg (203 lb)   SpO2 100%   BMI 26.78 kg/m²      Physical Exam   Constitutional: He is oriented to person, place, and time. He appears well-developed and well-nourished. No distress.   HENT:   Head: Normocephalic and atraumatic.   Right Ear: Hearing, tympanic membrane, external ear and ear canal normal.   Left Ear: Hearing, tympanic membrane, external ear and ear canal normal.   Nose: Mucosal edema present. No rhinorrhea. Right sinus exhibits maxillary sinus tenderness and frontal sinus tenderness. Left sinus exhibits no maxillary sinus " I saw and evaluated the patient. I personally obtained the key and critical portions of the history and physical exam or was physically present for key and critical portions performed by the resident/fellow. I reviewed the resident/fellow's documentation and discussed the patient with the resident/fellow. I agree with the resident/fellow's medical decision making as documented in the note.    Artemio Morgan, DO       tenderness and no frontal sinus tenderness.   Mouth/Throat: Oropharynx is clear and moist. No oropharyngeal exudate, posterior oropharyngeal edema or posterior oropharyngeal erythema.   Eyes: Pupils are equal, round, and reactive to light. Conjunctivae are normal. Right eye exhibits no discharge. Left eye exhibits no discharge.   Neck: Normal range of motion.   Cardiovascular: Normal rate, regular rhythm and normal heart sounds.    No murmur heard.  Pulmonary/Chest: Effort normal and breath sounds normal. No respiratory distress. He has no wheezes.   Musculoskeletal: Normal range of motion.   Lymphadenopathy:     He has no cervical adenopathy.   Neurological: He is alert and oriented to person, place, and time.   Skin: Skin is warm and dry. He is not diaphoretic.   Psychiatric: He has a normal mood and affect. His behavior is normal.   Nursing note and vitals reviewed.       PMH:  has a past medical history of Allergy; Anxiety; Diabetes (HCC); Head ache; Hypertension; and Migraines.  MEDS:   Current Outpatient Prescriptions:   •  amoxicillin-clavulanate (AUGMENTIN) 875-125 MG Tab, Take 1 Tab by mouth 2 times a day for 7 days., Disp: 14 Tab, Rfl: 0  •  gabapentin (NEURONTIN) 600 MG tablet, TAKE 2 TABS BY MOUTH 3 TIMES A DAY., Disp: 180 Tab, Rfl: 6  •  Dapagliflozin-Metformin HCl ER (XIGDUO XR)  MG TABLET SR 24 HR, Take 1 Tab by mouth every morning., Disp: 30 Tab, Rfl: 11  •  Dulaglutide (TRULICITY) 1.5 MG/0.5ML Solution Pen-injector, Inject 0.5 mL as instructed every 7 days., Disp: 4 PEN, Rfl: 6  •  pioglitazone (ACTOS) 30 MG Tab, Take 1 Tab by mouth every day., Disp: 90 Tab, Rfl: 3  •  divalproex ER (DEPAKOTE ER) 500 MG TABLET SR 24 HR, Take 1 Tab by mouth 2 Times a Day., Disp: 180 Tab, Rfl: 1  •  amitriptyline (ELAVIL) 25 MG Tab, Take 1 Tab by mouth every bedtime., Disp: 30 Tab, Rfl: 4  •  BOTOX 200 units Recon Soln, , Disp: , Rfl:   •  Insulin Degludec (TRESIBA FLEXTOUCH) 200 UNIT/ML Solution Pen-injector,  Inject 50 Units as instructed every bedtime., Disp: 3 PEN, Rfl: 2  •  Lancets Misc, Lancets order: Lancets for [employer]ision xtra meter. Sig: use am and prn ssx high or low sugar., Disp: 100 Each, Rfl: 3  •  SUMAtriptan (IMITREX) 50 MG Tab, Take 1 Tab by mouth Once PRN for Migraine., Disp: 12 Tab, Rfl: 6  •  SUMAtriptan Succinate 4 MG/0.5ML Solution Auto-injector, Inject 1 Application as instructed as needed., Disp: 0.5 mL, Rfl: 6  •  ondansetron (ZOFRAN ODT) 4 MG TABLET DISPERSIBLE, Take 1 Tab by mouth every four hours as needed (give PO if no IV route available)., Disp: 30 Tab, Rfl: 0  ALLERGIES:   Allergies   Allergen Reactions   • Acetaminophen Swelling     Face swells up for 4 hours     SURGHX:   Past Surgical History:   Procedure Laterality Date   • EYE SURGERY     • KNEE ARTHROTOMY      plica removal 2016     SOCHX:  reports that he has never smoked. His smokeless tobacco use includes Chew. He reports that he does not drink alcohol or use drugs.  FH: family history includes Diabetes in his paternal grandmother; No Known Problems in his father, mother, and sister.       Assessment/Plan:     1. Acute non-recurrent maxillary sinusitis  - amoxicillin-clavulanate (AUGMENTIN) 875-125 MG Tab; Take 1 Tab by mouth 2 times a day for 7 days.  Dispense: 14 Tab; Refill: 0    Advised patient symptoms are most likely viral in etiology, recommend supportive care. Increased fluids and rest. Discussed use of OTC decongestant, nedi-pot, humidifier, and Flonase nasal spray for symptomatic relief. Abx given as precaution given patient's recent botox injection procedure. Call or return to office if symptoms persist or worsen. The patient demonstrated a good understanding and agreed with the treatment plan.